# Patient Record
Sex: MALE | Race: WHITE | NOT HISPANIC OR LATINO | URBAN - METROPOLITAN AREA
[De-identification: names, ages, dates, MRNs, and addresses within clinical notes are randomized per-mention and may not be internally consistent; named-entity substitution may affect disease eponyms.]

---

## 2020-10-05 ENCOUNTER — INPATIENT (INPATIENT)
Facility: HOSPITAL | Age: 85
LOS: 17 days | Discharge: NURSING, REHAB OR LONG-TERM CARE | End: 2020-10-23
Attending: INTERNAL MEDICINE | Admitting: INTERNAL MEDICINE
Payer: MEDICARE

## 2020-10-05 ENCOUNTER — TRANSCRIPTION ENCOUNTER (OUTPATIENT)
Age: 85
End: 2020-10-05

## 2020-10-05 VITALS
SYSTOLIC BLOOD PRESSURE: 123 MMHG | HEART RATE: 73 BPM | HEIGHT: 75 IN | RESPIRATION RATE: 16 BRPM | WEIGHT: 229.94 LBS | OXYGEN SATURATION: 98 % | TEMPERATURE: 98 F | DIASTOLIC BLOOD PRESSURE: 74 MMHG

## 2020-10-05 LAB
ABO RH CONFIRMATION: SIGNIFICANT CHANGE UP
ALBUMIN SERPL ELPH-MCNC: 3.7 G/DL — SIGNIFICANT CHANGE UP (ref 3.3–5)
ALP SERPL-CCNC: 90 U/L — SIGNIFICANT CHANGE UP (ref 40–120)
ALT FLD-CCNC: 42 U/L — SIGNIFICANT CHANGE UP (ref 12–78)
ANION GAP SERPL CALC-SCNC: 7 MMOL/L — SIGNIFICANT CHANGE UP (ref 5–17)
APTT BLD: 27 SEC — LOW (ref 27.5–35.5)
AST SERPL-CCNC: 30 U/L — SIGNIFICANT CHANGE UP (ref 15–37)
BASOPHILS # BLD AUTO: 0.01 K/UL — SIGNIFICANT CHANGE UP (ref 0–0.2)
BASOPHILS NFR BLD AUTO: 0.2 % — SIGNIFICANT CHANGE UP (ref 0–2)
BILIRUB SERPL-MCNC: 0.6 MG/DL — SIGNIFICANT CHANGE UP (ref 0.2–1.2)
BLD GP AB SCN SERPL QL: SIGNIFICANT CHANGE UP
BUN SERPL-MCNC: 33 MG/DL — HIGH (ref 7–23)
CALCIUM SERPL-MCNC: 9.3 MG/DL — SIGNIFICANT CHANGE UP (ref 8.5–10.1)
CHLORIDE SERPL-SCNC: 109 MMOL/L — HIGH (ref 96–108)
CO2 SERPL-SCNC: 23 MMOL/L — SIGNIFICANT CHANGE UP (ref 22–31)
CREAT SERPL-MCNC: 1.27 MG/DL — SIGNIFICANT CHANGE UP (ref 0.5–1.3)
EOSINOPHIL # BLD AUTO: 0.01 K/UL — SIGNIFICANT CHANGE UP (ref 0–0.5)
EOSINOPHIL NFR BLD AUTO: 0.2 % — SIGNIFICANT CHANGE UP (ref 0–6)
GLUCOSE SERPL-MCNC: 106 MG/DL — HIGH (ref 70–99)
HCT VFR BLD CALC: 43.7 % — SIGNIFICANT CHANGE UP (ref 39–50)
HGB BLD-MCNC: 14.5 G/DL — SIGNIFICANT CHANGE UP (ref 13–17)
IMM GRANULOCYTES NFR BLD AUTO: 0.7 % — SIGNIFICANT CHANGE UP (ref 0–1.5)
INR BLD: 1.13 RATIO — SIGNIFICANT CHANGE UP (ref 0.88–1.16)
LYMPHOCYTES # BLD AUTO: 0.8 K/UL — LOW (ref 1–3.3)
LYMPHOCYTES # BLD AUTO: 13.1 % — SIGNIFICANT CHANGE UP (ref 13–44)
MCHC RBC-ENTMCNC: 30.9 PG — SIGNIFICANT CHANGE UP (ref 27–34)
MCHC RBC-ENTMCNC: 33.2 GM/DL — SIGNIFICANT CHANGE UP (ref 32–36)
MCV RBC AUTO: 93.2 FL — SIGNIFICANT CHANGE UP (ref 80–100)
MONOCYTES # BLD AUTO: 0.41 K/UL — SIGNIFICANT CHANGE UP (ref 0–0.9)
MONOCYTES NFR BLD AUTO: 6.7 % — SIGNIFICANT CHANGE UP (ref 2–14)
NEUTROPHILS # BLD AUTO: 4.84 K/UL — SIGNIFICANT CHANGE UP (ref 1.8–7.4)
NEUTROPHILS NFR BLD AUTO: 79.1 % — HIGH (ref 43–77)
NRBC # BLD: 0 /100 WBCS — SIGNIFICANT CHANGE UP (ref 0–0)
PLATELET # BLD AUTO: 196 K/UL — SIGNIFICANT CHANGE UP (ref 150–400)
POTASSIUM SERPL-MCNC: 4.3 MMOL/L — SIGNIFICANT CHANGE UP (ref 3.5–5.3)
POTASSIUM SERPL-SCNC: 4.3 MMOL/L — SIGNIFICANT CHANGE UP (ref 3.5–5.3)
PROT SERPL-MCNC: 7.3 GM/DL — SIGNIFICANT CHANGE UP (ref 6–8.3)
PROTHROM AB SERPL-ACNC: 13 SEC — SIGNIFICANT CHANGE UP (ref 10.6–13.6)
RBC # BLD: 4.69 M/UL — SIGNIFICANT CHANGE UP (ref 4.2–5.8)
RBC # FLD: 13.8 % — SIGNIFICANT CHANGE UP (ref 10.3–14.5)
SARS-COV-2 RNA SPEC QL NAA+PROBE: SIGNIFICANT CHANGE UP
SODIUM SERPL-SCNC: 139 MMOL/L — SIGNIFICANT CHANGE UP (ref 135–145)
TROPONIN I SERPL-MCNC: <.015 NG/ML — SIGNIFICANT CHANGE UP (ref 0.01–0.04)
WBC # BLD: 6.11 K/UL — SIGNIFICANT CHANGE UP (ref 3.8–10.5)
WBC # FLD AUTO: 6.11 K/UL — SIGNIFICANT CHANGE UP (ref 3.8–10.5)

## 2020-10-05 PROCEDURE — 73562 X-RAY EXAM OF KNEE 3: CPT | Mod: 26,RT

## 2020-10-05 PROCEDURE — 99223 1ST HOSP IP/OBS HIGH 75: CPT

## 2020-10-05 PROCEDURE — 99285 EMERGENCY DEPT VISIT HI MDM: CPT

## 2020-10-05 PROCEDURE — 73552 X-RAY EXAM OF FEMUR 2/>: CPT | Mod: 26,RT

## 2020-10-05 PROCEDURE — 99222 1ST HOSP IP/OBS MODERATE 55: CPT | Mod: 57

## 2020-10-05 PROCEDURE — 73502 X-RAY EXAM HIP UNI 2-3 VIEWS: CPT | Mod: 26,RT,76

## 2020-10-05 PROCEDURE — 93010 ELECTROCARDIOGRAM REPORT: CPT

## 2020-10-05 PROCEDURE — 71045 X-RAY EXAM CHEST 1 VIEW: CPT | Mod: 26

## 2020-10-05 RX ORDER — SODIUM CHLORIDE 9 MG/ML
1000 INJECTION, SOLUTION INTRAVENOUS
Refills: 0 | Status: DISCONTINUED | OUTPATIENT
Start: 2020-10-05 | End: 2020-10-06

## 2020-10-05 RX ORDER — MAGNESIUM HYDROXIDE 400 MG/1
30 TABLET, CHEWABLE ORAL DAILY
Refills: 0 | Status: DISCONTINUED | OUTPATIENT
Start: 2020-10-05 | End: 2020-10-06

## 2020-10-05 RX ORDER — HEPARIN SODIUM 5000 [USP'U]/ML
5000 INJECTION INTRAVENOUS; SUBCUTANEOUS EVERY 8 HOURS
Refills: 0 | Status: COMPLETED | OUTPATIENT
Start: 2020-10-05 | End: 2020-10-05

## 2020-10-05 RX ORDER — MORPHINE SULFATE 50 MG/1
2 CAPSULE, EXTENDED RELEASE ORAL ONCE
Refills: 0 | Status: DISCONTINUED | OUTPATIENT
Start: 2020-10-05 | End: 2020-10-05

## 2020-10-05 RX ORDER — ASCORBIC ACID 60 MG
500 TABLET,CHEWABLE ORAL
Refills: 0 | Status: DISCONTINUED | OUTPATIENT
Start: 2020-10-05 | End: 2020-10-06

## 2020-10-05 RX ORDER — HYDROMORPHONE HYDROCHLORIDE 2 MG/ML
0.5 INJECTION INTRAMUSCULAR; INTRAVENOUS; SUBCUTANEOUS EVERY 6 HOURS
Refills: 0 | Status: DISCONTINUED | OUTPATIENT
Start: 2020-10-05 | End: 2020-10-06

## 2020-10-05 RX ORDER — FOLIC ACID 0.8 MG
1 TABLET ORAL DAILY
Refills: 0 | Status: DISCONTINUED | OUTPATIENT
Start: 2020-10-05 | End: 2020-10-06

## 2020-10-05 RX ORDER — ACETAMINOPHEN 500 MG
1000 TABLET ORAL ONCE
Refills: 0 | Status: COMPLETED | OUTPATIENT
Start: 2020-10-05 | End: 2021-09-03

## 2020-10-05 RX ORDER — ACETAMINOPHEN 500 MG
1000 TABLET ORAL ONCE
Refills: 0 | Status: COMPLETED | OUTPATIENT
Start: 2020-10-05 | End: 2020-10-05

## 2020-10-05 RX ORDER — OXYCODONE HYDROCHLORIDE 5 MG/1
5 TABLET ORAL EVERY 4 HOURS
Refills: 0 | Status: DISCONTINUED | OUTPATIENT
Start: 2020-10-05 | End: 2020-10-06

## 2020-10-05 RX ORDER — ACETAMINOPHEN 500 MG
650 TABLET ORAL EVERY 6 HOURS
Refills: 0 | Status: DISCONTINUED | OUTPATIENT
Start: 2020-10-05 | End: 2020-10-06

## 2020-10-05 RX ORDER — OXYCODONE HYDROCHLORIDE 5 MG/1
10 TABLET ORAL EVERY 4 HOURS
Refills: 0 | Status: DISCONTINUED | OUTPATIENT
Start: 2020-10-05 | End: 2020-10-06

## 2020-10-05 RX ADMIN — Medication 1000 MILLIGRAM(S): at 23:23

## 2020-10-05 RX ADMIN — Medication 500 MILLIGRAM(S): at 17:41

## 2020-10-05 RX ADMIN — SODIUM CHLORIDE 65 MILLILITER(S): 9 INJECTION, SOLUTION INTRAVENOUS at 15:46

## 2020-10-05 RX ADMIN — Medication 650 MILLIGRAM(S): at 19:54

## 2020-10-05 RX ADMIN — HEPARIN SODIUM 5000 UNIT(S): 5000 INJECTION INTRAVENOUS; SUBCUTANEOUS at 21:19

## 2020-10-05 RX ADMIN — MORPHINE SULFATE 2 MILLIGRAM(S): 50 CAPSULE, EXTENDED RELEASE ORAL at 13:16

## 2020-10-05 RX ADMIN — Medication 400 MILLIGRAM(S): at 22:53

## 2020-10-05 RX ADMIN — Medication 650 MILLIGRAM(S): at 17:42

## 2020-10-05 RX ADMIN — MORPHINE SULFATE 2 MILLIGRAM(S): 50 CAPSULE, EXTENDED RELEASE ORAL at 12:46

## 2020-10-05 NOTE — H&P ADULT - NSHPPHYSICALEXAM_GEN_ALL_CORE
GEN: NAD     RLE: skin intact, extremity shortened and internally rotated, swelling and deformity seen over right hip, TTP at right hip, ROM right hip limited by pain, NTTP at right knee, calf, ankle, SILT L2-S1, motor intact TA/EHL/FHL/GSC, DP pulse present, compartments soft and compressible    Secondary Exam: Benign, Skin intact, NTTP along axial spine, SILT throughout, motor grossly intact throughout, no other orthopedic injuries at this time, compartments soft and compressible

## 2020-10-05 NOTE — ED ADULT NURSE NOTE - OBJECTIVE STATEMENT
Patient received with complaints of pain to R/ LLE post fall of chair while at MD office around 1120. Inversion noted to site.

## 2020-10-05 NOTE — ED PROVIDER NOTE - PHYSICAL EXAMINATION
Gen: Alert, Well appearing. NAD    Head: NC, AT, PERRL, normal lids/conjunctiva   ENT: Bilateral TM WNL, patent oropharynx without erythema/exudate, uvula midline  Neck: supple, no tenderness/meningismus  Pulm: Bilateral clear BS, normal resp effort  CV: RRR, no M/R/G, +dist pulses   Abd: soft, NT/ND, +BS, no guarding/rebound tenderness  Mskel: extremities x4 with normal ROM. no ctl spine ttp. no edema/erythema/cyanosis, no pelvis tenderness.   Lt leg: no pain, mild bow legged and unable to fully extend  Rt leg (abnormal): gross varus deformity with only mild pain to distal femur. unable to fully straighten. able to move toes and flex/extend ankle. good DP/PT pulses. CR < 3secs  Skin: no rash, no bruising  Neuro: AAOx3, no sensory/motor deficits, CN 2-12 intact

## 2020-10-05 NOTE — CONSULT NOTE ADULT - SUBJECTIVE AND OBJECTIVE BOX
HPI:  85M otherwise healthy presents to the ED for right hip pain following a mechanical fall. Pt was sitting down in a chair when he missed the chair falling onto his right hip, he experienced immediate right hip pain and swelling. He denies hit to the head, LOC, any other orthopedic injuries. Pt denies SOB, CP, calf pain. (05 Oct 2020 16:02)      PAST MEDICAL & SURGICAL HISTORY:  irregular heart rate     No significant past surgical history        REVIEW OF SYSTEMS:    CONSTITUTIONAL: No fever, weight loss, or fatigue  EYES: No eye pain, visual disturbances, or discharge  ENMT:  No difficulty hearing, tinnitus, vertigo; No sinus or throat pain  NECK: No pain or stiffness  BREASTS: No pain, masses, or nipple discharge  RESPIRATORY: No cough, wheezing, chills or hemoptysis; No shortness of breath  CARDIOVASCULAR: No chest pain, palpitations, dizziness, or leg swelling  GASTROINTESTINAL: No abdominal or epigastric pain. No nausea, vomiting, or hematemesis; No diarrhea or constipation. No melena or hematochezia.  GENITOURINARY: No dysuria, frequency, hematuria, or incontinence  NEUROLOGICAL: No headaches, memory loss, loss of strength, numbness, or tremors  SKIN: No itching, burning, rashes, or lesions   LYMPH NODES: No enlarged glands  ENDOCRINE: No heat or cold intolerance; No hair loss  MUSCULOSKELETAL: No joint pain or swelling; No muscle, back, or extremity pain  PSYCHIATRIC: No depression, anxiety, mood swings, or difficulty sleeping  HEME/LYMPH: No easy bruising, or bleeding gums  ALLERGY AND IMMUNOLOGIC: No hives or eczema      MEDICATIONS  (STANDING):  acetaminophen   Tablet .. 650 milliGRAM(s) Oral every 6 hours  ascorbic acid 500 milliGRAM(s) Oral two times a day  folic acid 1 milliGRAM(s) Oral daily  lactated ringers. 1000 milliLiter(s) (65 mL/Hr) IV Continuous <Continuous>  multivitamin 1 Tablet(s) Oral daily    MEDICATIONS  (PRN):  HYDROmorphone  Injectable 0.5 milliGRAM(s) IV Push every 6 hours PRN Severe Pain (7 - 10)  magnesium hydroxide Suspension 30 milliLiter(s) Oral daily PRN Constipation  oxyCODONE    IR 10 milliGRAM(s) Oral every 4 hours PRN Severe Pain (7 - 10)  oxyCODONE    IR 5 milliGRAM(s) Oral every 4 hours PRN Moderate Pain (4 - 6)      Allergies    No Known Allergies    Intolerances        SOCIAL HISTORY:    FAMILY HISTORY:      Vital Signs Last 24 Hrs  T(C): 36.6 (05 Oct 2020 17:35), Max: 36.7 (05 Oct 2020 12:15)  T(F): 97.8 (05 Oct 2020 17:35), Max: 98 (05 Oct 2020 12:15)  HR: 84 (05 Oct 2020 17:35) (70 - 84)  BP: 129/69 (05 Oct 2020 17:35) (123/74 - 129/69)  BP(mean): --  RR: 18 (05 Oct 2020 17:35) (16 - 18)  SpO2: 98% (05 Oct 2020 17:35) (98% - 100%)      · Physical Examination: Gen: Alert, Well appearing. NAD    	Head: NC, AT, PERRL, normal lids/conjunctiva   	ENT: Bilateral TM WNL, patent oropharynx without erythema/exudate, uvula midline  	Neck: supple, no tenderness/meningismus  	Pulm: Bilateral clear BS, normal resp effort  	CV: RRR, no M/R/G, +dist pulses   	Abd: soft, NT/ND, +BS, no guarding/rebound tenderness  	Mskel: extremities x4 with normal ROM. no ctl spine ttp. no edema/erythema/cyanosis, no pelvis tenderness.   	Lt leg: no pain, mild bow legged and unable to fully extend  	Rt leg (abnormal): gross varus deformity with only mild pain to distal femur. unable to fully straighten. able to move toes and flex/extend ankle. good DP/PT pulses. CR < 3secs  	Skin: no rash, no bruising  Neuro: AAOx3, no sensory/motor deficits, CN 2-12 intact  LABS:                        14.5   6.11  )-----------( 196      ( 05 Oct 2020 12:54 )             43.7     10-05    139  |  109<H>  |  33<H>  ----------------------------<  106<H>  4.3   |  23  |  1.27    Ca    9.3      05 Oct 2020 12:54    TPro  7.3  /  Alb  3.7  /  TBili  0.6  /  DBili  x   /  AST  30  /  ALT  42  /  AlkPhos  90  10-05    PT/INR - ( 05 Oct 2020 12:54 )   PT: 13.0 sec;   INR: 1.13 ratio         PTT - ( 05 Oct 2020 12:54 )  PTT:27.0 sec      RADIOLOGY & ADDITIONAL STUDIES:    < from: Xray Chest 1 View- PORTABLE-Urgent (Xray Chest 1 View- PORTABLE-Urgent .) (10.05.20 @ 14:15) >  EXAM:  XR CHEST PORTABLE URGENT 1V                            PROCEDURE DATE:  10/05/2020          INTERPRETATION:  A single chest x-ray was obtained on October 5, 2020.    Indication: Broken fever. Admittance side.    Impression:    The heart is normal in size. The lungs are clear. No pleural effusion. No pneumothorax. Degenerative changes of the thoracic spine.    < from: Xray Hip w/ Pelvis 2 or 3 Views, Right (10.05.20 @ 15:22) >    EXAM:  XR HIP WITH PELV 2-3V RT                            PROCEDURE DATE:  10/05/2020          INTERPRETATION:  Date of study: 10/5/20 at 3:09PM    Comparison: Earlier 10/5/20 pelvis and right hip films.    Technique: 2 AP views of pelvis; 1 cross-table lateral view of right hip.    FINDINGS:  Demonstration of acute, overriding, displaced proximal right femoral fracture.  On cross-table lateral view, there is override of the fragments. Distal fragment lies dorsal to the proximal fragment.  Lateral and plantar angulation of the fracture,    Impression:  Redemonstration of acute, overriding, displaced, proximal right femoral fracture.              Imaging Personally Reviewed:  [ X] YES  [ ] NO    Consultant(s) Notes Reviewed:  [X ] YES  [ ] NO    Care Discussed with Consultants/Other Providers [X ] YES  [ ] NO

## 2020-10-05 NOTE — H&P ADULT - HISTORY OF PRESENT ILLNESS
85M otherwise healthy presents to the ED for right hip pain following a mechanical fall. Pt was sitting down in a chair when he missed the chair falling onto his right hip, he experienced immediate right hip pain and swelling. He denies hit to the head, LOC, any other orthopedic injuries. Pt denies SOB, CP, calf pain.

## 2020-10-05 NOTE — H&P ADULT - NSHPLABSRESULTS_GEN_ALL_CORE
Vital Signs Last 24 Hrs  T(C): 36.7 (05 Oct 2020 15:43), Max: 36.7 (05 Oct 2020 12:15)  T(F): 98 (05 Oct 2020 15:43), Max: 98 (05 Oct 2020 12:15)  HR: 70 (05 Oct 2020 15:43) (70 - 73)  BP: 126/88 (05 Oct 2020 15:43) (123/74 - 126/88)  BP(mean): --  RR: 18 (05 Oct 2020 15:43) (16 - 18)  SpO2: 100% (05 Oct 2020 15:43) (98% - 100%)      LABS:                          14.5   6.11  )-----------( 196      ( 05 Oct 2020 12:54 )             43.7     10-05    139  |  109<H>  |  33<H>  ----------------------------<  106<H>  4.3   |  23  |  1.27    Ca    9.3      05 Oct 2020 12:54    TPro  7.3  /  Alb  3.7  /  TBili  0.6  /  DBili  x   /  AST  30  /  ALT  42  /  AlkPhos  90  10-05    PT/INR - ( 05 Oct 2020 12:54 )   PT: 13.0 sec;   INR: 1.13 ratio         PTT - ( 05 Oct 2020 12:54 )  PTT:27.0 sec    Imaging: Xrays right hip demonstrate a 100% displaced, shortened, and medially comminuted subtrochanteric fracture.

## 2020-10-05 NOTE — CONSULT NOTE ADULT - ASSESSMENT
86 y/o male with no acute hipfracture and Afib. Patient evaluated by cardiology and deemed to have acceptable  cardiac risk for proposed procedure     MEDICALLY CLEAR FOR SURGERY     will need to echo after surgery     will need to consider long term anticoagulation     will consider full anticoagulation in consultation with Ortho taking acount post op bleeding risk

## 2020-10-05 NOTE — ED PROVIDER NOTE - CLINICAL SUMMARY MEDICAL DECISION MAKING FREE TEXT BOX
seen by ortho, for admission. seen by ortho, for admission for hip fx. pt noted with afib, does states he has irregular heart, and pt otehrwise asymptomatic. will admit to dr cm. dr shipman aware for consult

## 2020-10-05 NOTE — H&P ADULT - ASSESSMENT
85M w R subtrochanteric fx    -plan for ORIF with Dr. Richardson tomorrow  -admit to Dr. Richardson  -will need medical and cardiac clearance please comment in note  -pain control  -dvt ppx with scds, hold pharm ppx  -NPO after midnight  -preop  -Discussed plan for operation with pt and family, pt agrees, all questions answered  -Discussed with Dr. Richardson who agrees

## 2020-10-05 NOTE — ED PROVIDER NOTE - CARE PLAN
Principal Discharge DX:	Hip fracture   Principal Discharge DX:	Hip fracture  Secondary Diagnosis:	Atrial fibrillation

## 2020-10-05 NOTE — CONSULT NOTE ADULT - SUBJECTIVE AND OBJECTIVE BOX
CARDIOLOGY CONSULTATION NOTE                                                                             HPI:  86yo male with pmh irregular heart beat, presents with rt knee/distal femur pain and deformity s/p falling off chair. Pt is AOX3 and good historian and states he was trying to sit down on rolling chair and then he fell and his rt knee twisted. Pt with mild pain to knee/femur. Denies head strike, LOC. Pt on aspirin. Pt at baseline ambulates with walker.   	No fever/chills, No photophobia/eye pain/changes in vision, No ear pain/sore throat/dysphagia, No chest pain/palpitations, no SOB/cough, no wheeze/stridor, No abdominal pain, No N/V/D, no dysuria/frequency/discharge, No neck/back pain, no rash, no changes in neurological status/function.   REVIEW OF SYSTEMS: -----------------------------  CONSTITUTIONAL: No fever, weight loss, or fatigue EYES: No eye pain, visual disturbances, or discharge ENMT:  No difficulty hearing, tinnitus, vertigo; No sinus or throat pain NECK: No pain or stiffness BREASTS: No pain, masses, or nipple discharge RESPIRATORY: No cough, wheezing, chills or hemoptysis; No shortness of breath CARDIOVASCULAR: See HPI GASTROINTESTINAL: No abdominal or epigastric pain. No nausea, vomiting, or hematemesis; No diarrhea or constipation. No melena or hematochezia. GENITOURINARY: No dysuria, frequency, hematuria, or incontinence NEUROLOGICAL: No headaches, memory loss, loss of strength, numbness, or tremors SKIN: No itching, burning, rashes, or lesions  LYMPH NODES: No enlarged glands ENDOCRINE: No heat or cold intolerance; No hair loss MUSCULOSKELETAL: No joint pain or swelling; No muscle, back, or extremity pain PSYCHIATRIC: No depression, anxiety, mood swings, or difficulty sleeping HEME/LYMPH: No easy bruising, or bleeding gums ALLERGY AND IMMUNOLOGIC: No hives or eczema  Home Medications:   MEDICATIONS  (STANDING): lactated ringers. 1000 milliLiter(s) (65 mL/Hr) IV Continuous <Continuous>   ALLERGIES: No Known Allergies   FAMILY HISTORY:   PHYSICAL EXAMINATION: ----------------------------- T(C): 36.7 (10-05-20 @ 12:15), Max: 36.7 (10-05-20 @ 12:15) HR: 73 (10-05-20 @ 12:15) (73 - 73) BP: 123/74 (10-05-20 @ 12:15) (123/74 - 123/74) RR: 16 (10-05-20 @ 12:15) (16 - 16) SpO2: 98% (10-05-20 @ 12:15) (98% - 98%) Wt(kg): --  Height (cm): 190.5 (10-05 @ 12:15) Weight (kg): 104.3 (10-05 @ 12:15) BMI (kg/m2): 28.7 (10-05 @ 12:15) BSA (m2): 2.33 (10-05 @ 12:15)  Constitutional: well developed, normal appearance, well groomed, well nourished, no deformities and no acute distress.  Eyes: the conjunctiva exhibited no abnormalities and the eyelids demonstrated no xanthelasmas.  HEENT: normal oral mucosa, no oral pallor and no oral cyanosis.  Neck: normal jugular venous A waves present, normal jugular venous V waves present and no jugular venous moy A waves.  Pulmonary: no respiratory distress, normal respiratory rhythm and effort, no accessory muscle use and lungs were clear to auscultation bilaterally.  Cardiovascular: heart rate and rhythm were normal, normal S1 and S2 and no murmur, gallop, rub, heave or thrill are present.  Abdomen: soft, non-tender, no hepato-splenomegaly and no abdominal mass palpated.  Musculoskeletal: the gait could not be assessed..  Extremities: no clubbing of the fingernails, no localized cyanosis, no petechial hemorrhages and no ischemic changes.  Skin: normal skin color and pigmentation, no rash, no venous stasis, no skin lesions, no skin ulcer and no xanthoma was observed.  Psychiatric: oriented to person, place, and time, the affect was normal, the mood was normal and not feeling anxious.   ECG: -------    LABS:  -------- 10-05  139  |  109<H>  |  33<H> ----------------------------<  106<H> 4.3   |  23  |  1.27  Ca    9.3      05 Oct 2020 12:54  TPro  7.3  /  Alb  3.7  /  TBili  0.6  /  DBili  x   /  AST  30  /  ALT  42  /  AlkPhos  90  10-05                       14.5  6.11  )-----------( 196      ( 05 Oct 2020 12:54 )            43.7    PT/INR - ( 05 Oct 2020 12:54 )   PT: 13.0 sec;   INR: 1.13 ratio      PTT - ( 05 Oct 2020 12:54 )  PTT:27.0 sec  10-05 @ 12:54 CPK total:--, CKMB --, Troponin I - <.015 ng/mL     RADIOLOGY REPORTS: -----------------------------    ECHOCARDIOGRAM: ---------------------------     CARDIOLOGY CONSULTATION NOTE                                                                             HPI:  84yo male with pmh irregular heart beat, presents with rt knee/distal femur pain and deformity s/p falling off chair. Pt is AOX3 and good historian and states he was trying to sit down on rolling chair and then he fell and his rt knee twisted. Pt with mild pain to knee/femur. Denies head strike, LOC. Pt on aspirin. Pt at baseline ambulates with walker.   	No fever/chills, No photophobia/eye pain/changes in vision, No ear pain/sore throat/dysphagia, No chest pain/palpitations, no SOB/cough, no wheeze/stridor, No abdominal pain, No N/V/D, no dysuria/frequency/discharge, No neck/back pain, no rash, no changes in neurological status/function.   Patient aware of longstanding AF for several years, no other cardiac history but never had an ischemia evaluation. Patient had refused DOAC because of cost in the past. Also walks with walker due to sever sciatica.  REVIEW OF SYSTEMS: -----------------------------  CONSTITUTIONAL: No fever, weight loss, or fatigue EYES: No eye pain, visual disturbances, or discharge ENMT:  No difficulty hearing, tinnitus, vertigo; No sinus or throat pain NECK: No pain or stiffness BREASTS: No pain, masses, or nipple discharge RESPIRATORY: No cough, wheezing, chills or hemoptysis; No shortness of breath CARDIOVASCULAR: See HPI GASTROINTESTINAL: No abdominal or epigastric pain. No nausea, vomiting, or hematemesis; No diarrhea or constipation. No melena or hematochezia. GENITOURINARY: No dysuria, frequency, hematuria, or incontinence NEUROLOGICAL: No headaches, memory loss, loss of strength, numbness, or tremors SKIN: No itching, burning, rashes, or lesions  LYMPH NODES: No enlarged glands ENDOCRINE: No heat or cold intolerance; No hair loss MUSCULOSKELETAL: No joint pain or swelling; No muscle, back, or extremity pain PSYCHIATRIC: No depression, anxiety, mood swings, or difficulty sleeping HEME/LYMPH: No easy bruising, or bleeding gums ALLERGY AND IMMUNOLOGIC: No hives or eczema  Home Medications:   MEDICATIONS  (STANDING): lactated ringers. 1000 milliLiter(s) (65 mL/Hr) IV Continuous <Continuous>   ALLERGIES: No Known Allergies   FAMILY HISTORY:   PHYSICAL EXAMINATION: ----------------------------- T(C): 36.7 (10-05-20 @ 12:15), Max: 36.7 (10-05-20 @ 12:15) HR: 73 (10-05-20 @ 12:15) (73 - 73) BP: 123/74 (10-05-20 @ 12:15) (123/74 - 123/74) RR: 16 (10-05-20 @ 12:15) (16 - 16) SpO2: 98% (10-05-20 @ 12:15) (98% - 98%) Wt(kg): --  Height (cm): 190.5 (10-05 @ 12:15) Weight (kg): 104.3 (10-05 @ 12:15) BMI (kg/m2): 28.7 (10-05 @ 12:15) BSA (m2): 2.33 (10-05 @ 12:15)  Constitutional: well developed, normal appearance, well groomed, well nourished, no deformities and no acute distress.  Eyes: the conjunctiva exhibited no abnormalities and the eyelids demonstrated no xanthelasmas.  HEENT: normal oral mucosa, no oral pallor and no oral cyanosis.  Neck: normal jugular venous A waves present, normal jugular venous V waves present and no jugular venous moy A waves.  Pulmonary: no respiratory distress, normal respiratory rhythm and effort, no accessory muscle use and lungs were clear to auscultation bilaterally.  Cardiovascular: irreg, irreg, no murmur, gallop, rub, heave or thrill are present.  Abdomen: soft, non-tender, no hepato-splenomegaly and no abdominal mass palpated.  Musculoskeletal: the gait could not be assessed..  Extremities: no clubbing of the fingernails, no localized cyanosis, no petechial hemorrhages and no ischemic changes.  Skin: normal skin color and pigmentation, no rash, no venous stasis, no skin lesions, no skin ulcer and no xanthoma was observed.  Psychiatric: oriented to person, place, and time, the affect was normal, the mood was normal and not feeling anxious.   ECG: ------- < from: 12 Lead ECG (10.05.20 @ 14:33) >  Ventricular Rate 66 BPM  Atrial Rate 43 BPM  QRS Duration 150 ms  Q-T Interval 442 ms  QTC Calculation(Bazett) 463 ms  R Axis -8 degrees  T Axis 6 degrees  Diagnosis Line Atrial fibrillation with premature ventricular or aberrantly conducted complexes Right bundle branch block Abnormal ECG No previous ECGs available Confirmed by Abundio Taylor MD ([id number]) on 10/5/2020 3:41:52 PM  < end of copied text >    LABS:  -------- 10-05  139  |  109<H>  |  33<H> ----------------------------<  106<H> 4.3   |  23  |  1.27  Ca    9.3      05 Oct 2020 12:54  TPro  7.3  /  Alb  3.7  /  TBili  0.6  /  DBili  x   /  AST  30  /  ALT  42  /  AlkPhos  90  10-05                       14.5  6.11  )-----------( 196      ( 05 Oct 2020 12:54 )            43.7    PT/INR - ( 05 Oct 2020 12:54 )   PT: 13.0 sec;   INR: 1.13 ratio      PTT - ( 05 Oct 2020 12:54 )  PTT:27.0 sec  10-05 @ 12:54 CPK total:--, CKMB --, Troponin I - <.015 ng/mL     RADIOLOGY REPORTS: ----------------------------- < from: Xray Chest 1 View- PORTABLE-Urgent (Xray Chest 1 View- PORTABLE-Urgent .) (10.05.20 @ 14:15) >  EXAM:  XR CHEST PORTABLE URGENT 1V                         PROCEDURE DATE:  10/05/2020      INTERPRETATION:  A single chest x-ray was obtained on October 5, 2020.  Indication: Broken fever. Admittance side.  Impression:  The heart is normal in size. The lungs are clear. No pleural effusion. No pneumothorax. Degenerative changes of the thoracic spine.      BANDAR BASS M.D., ATTENDING RADIOLOGIST This document has been electronically signed. Oct  5 2020  4:03PM  < end of copied text >

## 2020-10-05 NOTE — CONSULT NOTE ADULT - ASSESSMENT
The chart has been reviewed but the patient has not yet been examined.  Full note to follow. 86yo male s/p mechanical fall with right intertrochanteric fracture. H/o AFib and HTN, rates adequately controlled.  No recent chest pain or dyspnea.  No cardiac contraindications to ORIF, patient at low risk overall based on current history.  Would discuss need for long term AC with patient post op.  Echo requested for risk stratification but can wait until after surgery.

## 2020-10-05 NOTE — ED PROVIDER NOTE - OBJECTIVE STATEMENT
86yo male with no pertinent pmh presnets with rt knee/distal femur pain and deformity s/p falling off chair. Pt is AOX3 and good historian and states he was trying to sit down on rolling chair and then he fell and his rt knee twisted. Pt with mild pain to knee/femur. Denies head strike, LOC. Pt on aspirin. Pt at baseline ambulates with walker.     No fever/chills, No photophobia/eye pain/changes in vision, No ear pain/sore throat/dysphagia, No chest pain/palpitations, no SOB/cough, no wheeze/stridor, No abdominal pain, No N/V/D, no dysuria/frequency/discharge, No neck/back pain, no rash, no changes in neurological status/function. 84yo male with pmh irregular heart beat (on aspirin), presents with rt knee/distal femur pain and deformity s/p falling off chair. Pt is AOX3 and good historian and states he was trying to sit down on rolling chair and then he fell and his rt knee twisted. Pt with mild pain to knee/femur. Denies head strike, LOC. Pt on aspirin. Pt at baseline ambulates with walker.     No fever/chills, No photophobia/eye pain/changes in vision, No ear pain/sore throat/dysphagia, No chest pain/palpitations, no SOB/cough, no wheeze/stridor, No abdominal pain, No N/V/D, no dysuria/frequency/discharge, No neck/back pain, no rash, no changes in neurological status/function.

## 2020-10-06 ENCOUNTER — TRANSCRIPTION ENCOUNTER (OUTPATIENT)
Age: 85
End: 2020-10-06

## 2020-10-06 LAB
ANION GAP SERPL CALC-SCNC: 3 MMOL/L — LOW (ref 5–17)
ANION GAP SERPL CALC-SCNC: 7 MMOL/L — SIGNIFICANT CHANGE UP (ref 5–17)
APPEARANCE UR: CLEAR — SIGNIFICANT CHANGE UP
APTT BLD: 28.6 SEC — SIGNIFICANT CHANGE UP (ref 27.5–35.5)
BACTERIA # UR AUTO: ABNORMAL
BILIRUB UR-MCNC: NEGATIVE — SIGNIFICANT CHANGE UP
BUN SERPL-MCNC: 33 MG/DL — HIGH (ref 7–23)
BUN SERPL-MCNC: 33 MG/DL — HIGH (ref 7–23)
CALCIUM SERPL-MCNC: 8.8 MG/DL — SIGNIFICANT CHANGE UP (ref 8.5–10.1)
CALCIUM SERPL-MCNC: 9.1 MG/DL — SIGNIFICANT CHANGE UP (ref 8.5–10.1)
CHLORIDE SERPL-SCNC: 106 MMOL/L — SIGNIFICANT CHANGE UP (ref 96–108)
CHLORIDE SERPL-SCNC: 106 MMOL/L — SIGNIFICANT CHANGE UP (ref 96–108)
CO2 SERPL-SCNC: 27 MMOL/L — SIGNIFICANT CHANGE UP (ref 22–31)
CO2 SERPL-SCNC: 28 MMOL/L — SIGNIFICANT CHANGE UP (ref 22–31)
COLOR SPEC: YELLOW — SIGNIFICANT CHANGE UP
CREAT SERPL-MCNC: 1.32 MG/DL — HIGH (ref 0.5–1.3)
CREAT SERPL-MCNC: 1.36 MG/DL — HIGH (ref 0.5–1.3)
DIFF PNL FLD: ABNORMAL
EPI CELLS # UR: SIGNIFICANT CHANGE UP
GLUCOSE SERPL-MCNC: 112 MG/DL — HIGH (ref 70–99)
GLUCOSE SERPL-MCNC: 129 MG/DL — HIGH (ref 70–99)
GLUCOSE UR QL: NEGATIVE MG/DL — SIGNIFICANT CHANGE UP
HCT VFR BLD CALC: 36.3 % — LOW (ref 39–50)
HCT VFR BLD CALC: 38.5 % — LOW (ref 39–50)
HGB BLD-MCNC: 11.7 G/DL — LOW (ref 13–17)
HGB BLD-MCNC: 12.6 G/DL — LOW (ref 13–17)
INR BLD: 1.13 RATIO — SIGNIFICANT CHANGE UP (ref 0.88–1.16)
KETONES UR-MCNC: ABNORMAL
LEUKOCYTE ESTERASE UR-ACNC: ABNORMAL
MCHC RBC-ENTMCNC: 31.1 PG — SIGNIFICANT CHANGE UP (ref 27–34)
MCHC RBC-ENTMCNC: 31.1 PG — SIGNIFICANT CHANGE UP (ref 27–34)
MCHC RBC-ENTMCNC: 32.2 GM/DL — SIGNIFICANT CHANGE UP (ref 32–36)
MCHC RBC-ENTMCNC: 32.7 GM/DL — SIGNIFICANT CHANGE UP (ref 32–36)
MCV RBC AUTO: 95.1 FL — SIGNIFICANT CHANGE UP (ref 80–100)
MCV RBC AUTO: 96.5 FL — SIGNIFICANT CHANGE UP (ref 80–100)
NITRITE UR-MCNC: POSITIVE
NRBC # BLD: 0 /100 WBCS — SIGNIFICANT CHANGE UP (ref 0–0)
NRBC # BLD: 0 /100 WBCS — SIGNIFICANT CHANGE UP (ref 0–0)
PH UR: 5 — SIGNIFICANT CHANGE UP (ref 5–8)
PLATELET # BLD AUTO: 163 K/UL — SIGNIFICANT CHANGE UP (ref 150–400)
PLATELET # BLD AUTO: 166 K/UL — SIGNIFICANT CHANGE UP (ref 150–400)
POTASSIUM SERPL-MCNC: 4.8 MMOL/L — SIGNIFICANT CHANGE UP (ref 3.5–5.3)
POTASSIUM SERPL-MCNC: 5.2 MMOL/L — SIGNIFICANT CHANGE UP (ref 3.5–5.3)
POTASSIUM SERPL-SCNC: 4.8 MMOL/L — SIGNIFICANT CHANGE UP (ref 3.5–5.3)
POTASSIUM SERPL-SCNC: 5.2 MMOL/L — SIGNIFICANT CHANGE UP (ref 3.5–5.3)
PROT UR-MCNC: 30 MG/DL
PROTHROM AB SERPL-ACNC: 13 SEC — SIGNIFICANT CHANGE UP (ref 10.6–13.6)
RBC # BLD: 3.76 M/UL — LOW (ref 4.2–5.8)
RBC # BLD: 4.05 M/UL — LOW (ref 4.2–5.8)
RBC # FLD: 13.7 % — SIGNIFICANT CHANGE UP (ref 10.3–14.5)
RBC # FLD: 13.9 % — SIGNIFICANT CHANGE UP (ref 10.3–14.5)
RBC CASTS # UR COMP ASSIST: ABNORMAL /HPF (ref 0–4)
SARS-COV-2 IGG SERPL QL IA: NEGATIVE — SIGNIFICANT CHANGE UP
SARS-COV-2 IGM SERPL IA-ACNC: <0.1 INDEX — SIGNIFICANT CHANGE UP
SODIUM SERPL-SCNC: 137 MMOL/L — SIGNIFICANT CHANGE UP (ref 135–145)
SODIUM SERPL-SCNC: 140 MMOL/L — SIGNIFICANT CHANGE UP (ref 135–145)
SP GR SPEC: 1.02 — SIGNIFICANT CHANGE UP (ref 1.01–1.02)
UROBILINOGEN FLD QL: NEGATIVE MG/DL — SIGNIFICANT CHANGE UP
WBC # BLD: 12.44 K/UL — HIGH (ref 3.8–10.5)
WBC # BLD: 8.4 K/UL — SIGNIFICANT CHANGE UP (ref 3.8–10.5)
WBC # FLD AUTO: 12.44 K/UL — HIGH (ref 3.8–10.5)
WBC # FLD AUTO: 8.4 K/UL — SIGNIFICANT CHANGE UP (ref 3.8–10.5)
WBC UR QL: SIGNIFICANT CHANGE UP

## 2020-10-06 PROCEDURE — 93306 TTE W/DOPPLER COMPLETE: CPT | Mod: 26

## 2020-10-06 PROCEDURE — 27245 TREAT THIGH FRACTURE: CPT | Mod: RT

## 2020-10-06 PROCEDURE — 99233 SBSQ HOSP IP/OBS HIGH 50: CPT

## 2020-10-06 RX ORDER — CEFAZOLIN SODIUM 1 G
2000 VIAL (EA) INJECTION EVERY 8 HOURS
Refills: 0 | Status: DISCONTINUED | OUTPATIENT
Start: 2020-10-06 | End: 2020-10-07

## 2020-10-06 RX ORDER — ONDANSETRON 8 MG/1
4 TABLET, FILM COATED ORAL ONCE
Refills: 0 | Status: DISCONTINUED | OUTPATIENT
Start: 2020-10-06 | End: 2020-10-06

## 2020-10-06 RX ORDER — ONDANSETRON 8 MG/1
4 TABLET, FILM COATED ORAL EVERY 6 HOURS
Refills: 0 | Status: DISCONTINUED | OUTPATIENT
Start: 2020-10-06 | End: 2020-10-06

## 2020-10-06 RX ORDER — CEFTRIAXONE 500 MG/1
1000 INJECTION, POWDER, FOR SOLUTION INTRAMUSCULAR; INTRAVENOUS EVERY 24 HOURS
Refills: 0 | Status: DISCONTINUED | OUTPATIENT
Start: 2020-10-07 | End: 2020-10-07

## 2020-10-06 RX ORDER — ASPIRIN/CALCIUM CARB/MAGNESIUM 324 MG
1 TABLET ORAL
Qty: 60 | Refills: 0
Start: 2020-10-06 | End: 2020-11-04

## 2020-10-06 RX ORDER — OXYCODONE HYDROCHLORIDE 5 MG/1
5 TABLET ORAL EVERY 4 HOURS
Refills: 0 | Status: DISCONTINUED | OUTPATIENT
Start: 2020-10-06 | End: 2020-10-11

## 2020-10-06 RX ORDER — ONDANSETRON 8 MG/1
4 TABLET, FILM COATED ORAL EVERY 6 HOURS
Refills: 0 | Status: DISCONTINUED | OUTPATIENT
Start: 2020-10-06 | End: 2020-10-23

## 2020-10-06 RX ORDER — DOCUSATE SODIUM 100 MG
1 CAPSULE ORAL
Qty: 60 | Refills: 0
Start: 2020-10-06 | End: 2020-11-04

## 2020-10-06 RX ORDER — SODIUM CHLORIDE 9 MG/ML
1000 INJECTION, SOLUTION INTRAVENOUS
Refills: 0 | Status: DISCONTINUED | OUTPATIENT
Start: 2020-10-06 | End: 2020-10-06

## 2020-10-06 RX ORDER — HYDROMORPHONE HYDROCHLORIDE 2 MG/ML
0.2 INJECTION INTRAMUSCULAR; INTRAVENOUS; SUBCUTANEOUS
Refills: 0 | Status: DISCONTINUED | OUTPATIENT
Start: 2020-10-06 | End: 2020-10-06

## 2020-10-06 RX ORDER — HYDROMORPHONE HYDROCHLORIDE 2 MG/ML
0.5 INJECTION INTRAMUSCULAR; INTRAVENOUS; SUBCUTANEOUS EVERY 6 HOURS
Refills: 0 | Status: DISCONTINUED | OUTPATIENT
Start: 2020-10-06 | End: 2020-10-08

## 2020-10-06 RX ORDER — LANOLIN ALCOHOL/MO/W.PET/CERES
3 CREAM (GRAM) TOPICAL AT BEDTIME
Refills: 0 | Status: DISCONTINUED | OUTPATIENT
Start: 2020-10-06 | End: 2020-10-23

## 2020-10-06 RX ORDER — ASPIRIN/CALCIUM CARB/MAGNESIUM 324 MG
1 TABLET ORAL
Qty: 0 | Refills: 0 | DISCHARGE

## 2020-10-06 RX ORDER — ASCORBIC ACID 60 MG
500 TABLET,CHEWABLE ORAL
Refills: 0 | Status: DISCONTINUED | OUTPATIENT
Start: 2020-10-06 | End: 2020-10-23

## 2020-10-06 RX ORDER — FOLIC ACID 0.8 MG
1 TABLET ORAL DAILY
Refills: 0 | Status: DISCONTINUED | OUTPATIENT
Start: 2020-10-06 | End: 2020-10-23

## 2020-10-06 RX ORDER — METOCLOPRAMIDE HCL 10 MG
10 TABLET ORAL EVERY 6 HOURS
Refills: 0 | Status: DISCONTINUED | OUTPATIENT
Start: 2020-10-06 | End: 2020-10-06

## 2020-10-06 RX ORDER — OXYCODONE HYDROCHLORIDE 5 MG/1
1 TABLET ORAL
Qty: 42 | Refills: 0
Start: 2020-10-06 | End: 2020-10-12

## 2020-10-06 RX ORDER — OXYCODONE HYDROCHLORIDE 5 MG/1
10 TABLET ORAL EVERY 4 HOURS
Refills: 0 | Status: DISCONTINUED | OUTPATIENT
Start: 2020-10-06 | End: 2020-10-11

## 2020-10-06 RX ORDER — HYDROMORPHONE HYDROCHLORIDE 2 MG/ML
0.5 INJECTION INTRAMUSCULAR; INTRAVENOUS; SUBCUTANEOUS EVERY 6 HOURS
Refills: 0 | Status: DISCONTINUED | OUTPATIENT
Start: 2020-10-06 | End: 2020-10-06

## 2020-10-06 RX ORDER — ONDANSETRON 8 MG/1
1 TABLET, FILM COATED ORAL
Qty: 20 | Refills: 0
Start: 2020-10-06 | End: 2020-10-10

## 2020-10-06 RX ORDER — BENZOCAINE AND MENTHOL 5; 1 G/100ML; G/100ML
1 LIQUID ORAL EVERY 8 HOURS
Refills: 0 | Status: DISCONTINUED | OUTPATIENT
Start: 2020-10-06 | End: 2020-10-23

## 2020-10-06 RX ORDER — HYDROMORPHONE HYDROCHLORIDE 2 MG/ML
0.4 INJECTION INTRAMUSCULAR; INTRAVENOUS; SUBCUTANEOUS
Refills: 0 | Status: DISCONTINUED | OUTPATIENT
Start: 2020-10-06 | End: 2020-10-06

## 2020-10-06 RX ORDER — SODIUM CHLORIDE 9 MG/ML
1000 INJECTION, SOLUTION INTRAVENOUS
Refills: 0 | Status: DISCONTINUED | OUTPATIENT
Start: 2020-10-06 | End: 2020-10-08

## 2020-10-06 RX ORDER — ACETAMINOPHEN 500 MG
1000 TABLET ORAL ONCE
Refills: 0 | Status: COMPLETED | OUTPATIENT
Start: 2020-10-06 | End: 2020-10-06

## 2020-10-06 RX ADMIN — Medication 3 MILLIGRAM(S): at 23:30

## 2020-10-06 RX ADMIN — Medication 500 MILLIGRAM(S): at 05:10

## 2020-10-06 RX ADMIN — Medication 100 MILLIGRAM(S): at 22:14

## 2020-10-06 RX ADMIN — SODIUM CHLORIDE 65 MILLILITER(S): 9 INJECTION, SOLUTION INTRAVENOUS at 08:21

## 2020-10-06 RX ADMIN — Medication 1000 MILLIGRAM(S): at 19:43

## 2020-10-06 RX ADMIN — Medication 1 MILLIGRAM(S): at 12:30

## 2020-10-06 RX ADMIN — ONDANSETRON 4 MILLIGRAM(S): 8 TABLET, FILM COATED ORAL at 00:33

## 2020-10-06 RX ADMIN — OXYCODONE HYDROCHLORIDE 10 MILLIGRAM(S): 5 TABLET ORAL at 08:21

## 2020-10-06 RX ADMIN — Medication 500 MILLIGRAM(S): at 18:45

## 2020-10-06 RX ADMIN — SODIUM CHLORIDE 65 MILLILITER(S): 9 INJECTION, SOLUTION INTRAVENOUS at 00:33

## 2020-10-06 RX ADMIN — Medication 650 MILLIGRAM(S): at 13:31

## 2020-10-06 RX ADMIN — Medication 650 MILLIGRAM(S): at 12:31

## 2020-10-06 RX ADMIN — Medication 650 MILLIGRAM(S): at 05:40

## 2020-10-06 RX ADMIN — OXYCODONE HYDROCHLORIDE 10 MILLIGRAM(S): 5 TABLET ORAL at 03:38

## 2020-10-06 RX ADMIN — Medication 1 TABLET(S): at 12:30

## 2020-10-06 RX ADMIN — Medication 650 MILLIGRAM(S): at 05:10

## 2020-10-06 RX ADMIN — OXYCODONE HYDROCHLORIDE 10 MILLIGRAM(S): 5 TABLET ORAL at 09:29

## 2020-10-06 RX ADMIN — Medication 400 MILLIGRAM(S): at 18:45

## 2020-10-06 RX ADMIN — OXYCODONE HYDROCHLORIDE 10 MILLIGRAM(S): 5 TABLET ORAL at 03:08

## 2020-10-06 RX ADMIN — SODIUM CHLORIDE 70 MILLILITER(S): 9 INJECTION, SOLUTION INTRAVENOUS at 18:51

## 2020-10-06 NOTE — DISCHARGE NOTE PROVIDER - NSDCFUADDINST_GEN_ALL_CORE_FT
IM Nail DC Instructions:    1.	Resume previous diet, regular or diabetic as appropriate  2.	Weight Bearing as Tolerated with assistance and rolling walker  3.	Continue DVT/PE Prophylaxis. See Med Rec for Duration and dose.  4.	PT daily  5.	Follow up with Orthopedic Surgeon Dr. Richardson in 10-14 Days after Discharge from the Hospital. Call Office asap For Appointment.  6.	Staples to be removed Post-Op Day 14, provided wound is healed, no open areas or copious drainage.  7.	Ice the hip as much as possible  8.	Keep Aquacel bandage on Hip. Change only if wet or soiled using Tegaderm/Paper tape and dry gauze.  9.                OK to shower with Aquacel beige bandage, otherwise sponge bathe only. Will need assistance to shower. IM Nail DC Instructions:    1.	Resume previous diet, regular or diabetic as appropriate  2.	Weight Bearing as Tolerated with assistance and rolling walker  3.	Continue DVT/PE Prophylaxis. Xarelto 10mg.   4.	PT daily  5.	Follow up with Orthopedic Surgeon Dr. Richardson in 10-14 Days after Discharge from the Hospital. Call Office asap For Appointment.  6.	Staples to be removed Post-Op Day 14, provided wound is healed, no open areas or copious drainage.  7.	Ice the hip as much as possible  8.	Keep Aquacel bandage on Hip. Change only if wet or soiled using Tegaderm/Paper tape and dry gauze.  9.                OK to shower with Aquacel beige bandage, otherwise sponge bathe only. Will need assistance to shower.  10. Please follow up with primary care doctor after Discharge from hospital/rehab IM Nail DC Instructions:    1.	Resume previous diet, regular or diabetic as appropriate  2.	Weight Bearing as Tolerated with assistance and rolling walker  3.	Continue DVT/PE Prophylaxis. Xarelto 10mg.   4.	PT daily  5.	Follow up with Orthopedic Surgeon Dr. Richardson in 10-14 Days after Discharge from the Hospital. Call Office asap For Appointment.  6.	Staples to be removed Post-Op Day 14, provided wound is healed, no open areas or copious drainage.  7.	Ice the hip as much as possible  8.	Keep Aquacel bandage on Hip. Change only if wet or soiled using Tegaderm/Paper tape and dry gauze.  9.                OK to shower with Aquacel beige bandage, otherwise sponge bathe only. Will need assistance to shower.  10. Please follow up with cardiology, Dr. Taylor, in 1-2 weeks after Discharge from hospital/rehab. IM Nail DC Instructions:    1.	Resume previous diet, regular or diabetic as appropriate  2.	Weight Bearing as Tolerated with assistance and rolling walker  3.	Continue DVT/PE Prophylaxis. Xarelto 20mg.   4.	PT daily  5.	Follow up with Orthopedic Surgeon Dr. Richardson in 10-14 Days after Discharge from the Hospital. Call Office asap For Appointment.  6.	Staples to be removed Post-Op Day 14, provided wound is healed, no open areas or copious drainage.  7.	Ice the hip as much as possible  8.	Keep Aquacel bandage on Hip. Change only if wet or soiled using Tegaderm/Paper tape and dry gauze.  9.                OK to shower with Aquacel beige bandage, otherwise sponge bathe only. Will need assistance to shower.  10. Please follow up with cardiology, Dr. Taylor, in 1-2 weeks after Discharge from hospital/rehab.

## 2020-10-06 NOTE — DISCHARGE NOTE PROVIDER - PROVIDER TOKENS
PROVIDER:[TOKEN:[[id number]]] PROVIDER:[TOKEN:[[id number]]],PROVIDER:[TOKEN:[[id number]]] PROVIDER:[TOKEN:[[id number]]],PROVIDER:[TOKEN:[[id number]]],PROVIDER:[TOKEN:[[id number]]]

## 2020-10-06 NOTE — DISCHARGE NOTE PROVIDER - HOSPITAL COURSE
The patient is a 85 year old male status post Open Reduction Surgical Fixation of a right subtrochanteric femur fracture after being admitted through [facility]. The Patient was medically Optimized for the Previously mentioned surgical procedure. The patient was taken to the operating room on date mentioned above. Prophylactic antibiotics were started before the procedure and continued for 24 hours.  There were no complications during the procedure and patient tolerated the procedure well.  The patient was transferred to recovery room in stable condition and subsequently to surgical floor.  Patient was placed on Lovenox &  Heparin & Aspirin & Xarelto & Coumadin for anticoagulation.  All home medications were continued.  The patient received physical therapy daily and daily labs were followed.  The dressing / Splint/ Cast/ Brace was kept clean, dry, intact and changed on POD 3.  *The rest of the hospital stay was unremarkable The patient is a 85 year old male status post Open Reduction Surgical Fixation of a right subtrochanteric femur fracture after being admitted through [facility]. The Patient was medically Optimized for the Previously mentioned surgical procedure. The patient was taken to the operating room on date mentioned above. Prophylactic antibiotics were started before the procedure and continued for 24 hours.  There were no complications during the procedure and patient tolerated the procedure well.  The patient was transferred to recovery room in stable condition and subsequently to surgical floor.  Patient was placed on Lovenox &  Heparin & Aspirin & Xarelto & Coumadin for anticoagulation.  All home medications were continued.  The patient received physical therapy daily and daily labs were followed.  The dressing was kept clean, dry, intact and changed on POD 3. The rest of the hospital stay was unremarkable The patient is a 85 year old male status post Open Reduction Surgical Fixation of a right subtrochanteric femur fracture after being admitted through [facility] Room. The Patient was medically Optimized for the Previously mentioned surgical procedure. The patient was taken to the operating room on date mentioned above. Prophylactic antibiotics were started before the procedure and continued for 24 hours.  There were no complications during the procedure and patient tolerated the procedure well.  The patient was transferred to recovery room in stable condition and subsequently to surgical floor.  Patient was placed on  Xarelto anticoagulation.  All home medications were continued.  The patient received physical therapy daily and daily labs were followed. The dressing was kept clean, dry, intact and changed as needed. The rest of the hospital stay was unremarkable The patient is a 85 year old male status post Open Reduction Surgical Fixation of a right subtrochanteric femur fracture after being admitted through [facility] Room. The Patient was medically Optimized for the Previously mentioned surgical procedure. The patient was taken to the operating room on date mentioned above. Prophylactic antibiotics were started before the procedure and continued for 24 hours.  There were no complications during the procedure and patient tolerated the procedure well.  The patient was transferred to recovery room in stable condition and subsequently to surgical floor.  Patient was placed on  Xarelto anticoagulation.  All home medications were continued.  The patient received physical therapy daily and daily labs were followed. The dressing was kept clean, dry, intact and changed as needed. The rest of the hospital stay was unremarkable       illius vs gastric outlet obstruction  gastritis tolerating clears advance diet today   -Routine post surgical ortho care. pain controlled   -Staples to be removed POD 14  Ortho stable for DC, Outpatient follow up    -hypokalemia replaced      UTI   UA ++ LE and nitrite but minimal wbc and epithelial cells. ucx neg. remains afebrile   afebrile s/p three day course of levaquin     acute blood loss anemia. 2/2 surgery   - cbc stable       Afib  -Echo reviewed  - Pt  agreed to AC so would recommend a NOAC. He reports only falling this one time. understands risk of falling on AC   - restart  xarelto 20 mg daily   vomitng   -2/2 post op ileus vs gastric outlet syndrome s/p egd   aspiration PNA   - ct shows lower lobe opacities and wbc stable and afebrile. procalcitonin normal off Levaquin   renal mass   - incidentally found on ct  - needs follow up with urologist   - pt aware and reminded      45min spent on dc

## 2020-10-06 NOTE — DISCHARGE NOTE PROVIDER - CARE PROVIDERS DIRECT ADDRESSES
[email or web address]

## 2020-10-06 NOTE — PROGRESS NOTE ADULT - ASSESSMENT
Assessment:  85y Male with RIGHT subtrochanteric hip fx    -Pain control  -Non-weightbearing, strict bedrest  -NPO except medications  -IV fluids while NPO  -DVT ppx: SCDs, please hold chemical ppx for OR  -Plan for OR for IMN with Dr. Richardson today 10/6  -Med/cards clearance appreciated   -Medical management per primary team  -Will discuss with attending and will advise if plan changes.     Assessment:  85y Male with RIGHT subtrochanteric hip fx    -Pain control  -Non-weightbearing, strict bedrest  -NPO except medications  -IV fluids while NPO  -DVT ppx: SCDs, please hold chemical ppx for OR  -Plan for OR for IMN with Dr. Richardson today 10/6  -Med/cards clearance appreciated   -Medical management appreciated  -Will discuss with attending and will advise if plan changes.

## 2020-10-06 NOTE — PHYSICAL THERAPY INITIAL EVALUATION ADULT - CRITERIA FOR SKILLED THERAPEUTIC INTERVENTIONS
impairments found/Pending functional assessment/anticipated equipment needs at discharge/anticipated discharge recommendation/functional limitations in following categories/predicted duration of therapy intervention/risk reduction/prevention/rehab potential/therapy frequency

## 2020-10-06 NOTE — DISCHARGE NOTE PROVIDER - CARE PROVIDER_API CALL
Clayton Richardson  ORTHOPAEDIC SURGERY  [address], [address]  [place] [address]  Phone: [phone]  Fax: [phone]  Follow Up Time:    Clayton Richardson  ORTHOPAEDIC SURGERY  [address]  [place] [address]  Phone: [phone]  Fax: [phone]  Follow Up Time:     Abundio Taylor  CARDIOLOGY  [address] [place] [address]  Phone: [phone]  Fax: [phone]  Follow Up Time:    Clayton Richardson  ORTHOPAEDIC SURGERY  [address] [facility] [address] [facility] [address]  [place] [address]  Phone: [phone]  Fax: [phone]  Follow Up Time:     Abundio Taylor  CARDIOLOGY  [address] [place] [address]  Phone: [phone]  Fax: [phone]  Follow Up Time:     Jaelyn Patel)  Urology  [address] [place] [address]  Phone: [phone]  Fax: [phone]  Follow Up Time:

## 2020-10-06 NOTE — PHYSICAL THERAPY INITIAL EVALUATION ADULT - ADDITIONAL COMMENTS
As per pt, he lives with hi spouse in a high ranch house in the 1st floor with no steps to enter or negotiate, he ambulates through side walk, he was independent for all functional mobility using RW , PTA

## 2020-10-06 NOTE — PROGRESS NOTE ADULT - SUBJECTIVE AND OBJECTIVE BOX
HPI:  85M otherwise healthy presents to the ED for right hip pain following a mechanical fall. Pt was sitting down in a chair when he missed the chair falling onto his right hip, he experienced immediate right hip pain and swelling. He denies hit to the head, LOC, any other orthopedic injuries. Pt denies SOB, CP, calf pain. (05 Oct 2020 16:02)    Patient is a 85y old  Male who presents with a chief complaint of right hip fx (06 Oct 2020 17:16)      INTERVAL HPI/OVERNIGHT EVENTS: POD 0 s/p IMN placement     MEDICATIONS  (STANDING):  acetaminophen  IVPB .. 1000 milliGRAM(s) IV Intermittent once  ascorbic acid 500 milliGRAM(s) Oral two times a day  ceFAZolin   IVPB 2000 milliGRAM(s) IV Intermittent every 8 hours  cefTRIAXone   IVPB 1000 milliGRAM(s) IV Intermittent every 24 hours  folic acid 1 milliGRAM(s) Oral daily  lactated ringers. 1000 milliLiter(s) (70 mL/Hr) IV Continuous <Continuous>  multivitamin 1 Tablet(s) Oral daily    MEDICATIONS  (PRN):  benzocaine 15 mG/menthol 3.6 mG (Sugar-Free) Lozenge 1 Lozenge Oral every 8 hours PRN Sore Throat  HYDROmorphone  Injectable 0.5 milliGRAM(s) IV Push every 6 hours PRN Moderate Pain (4 - 6)  HYDROmorphone  Injectable 0.2 milliGRAM(s) IV Push every 10 minutes PRN Moderate Pain (4 - 6)  HYDROmorphone  Injectable 0.4 milliGRAM(s) IV Push every 10 minutes PRN Severe Pain (7 - 10)  melatonin 3 milliGRAM(s) Oral at bedtime PRN Sleep  ondansetron Injectable 4 milliGRAM(s) IV Push every 6 hours PRN Nausea and/or Vomiting  ondansetron Injectable 4 milliGRAM(s) IV Push once PRN Nausea and/or Vomiting  oxyCODONE    IR 10 milliGRAM(s) Oral every 4 hours PRN Moderate Pain (4 - 6)  oxyCODONE    IR 5 milliGRAM(s) Oral every 4 hours PRN Mild Pain (1 - 3)      Allergies    No Known Allergies    Intolerances        REVIEW OF SYSTEMS:  CONSTITUTIONAL: No fever, weight loss, or fatigue  EYES: No eye pain, visual disturbances, or discharge  ENMT:  No difficulty hearing, tinnitus, vertigo; No sinus or throat pain  NECK: No pain or stiffness  BREASTS: No pain, masses, or nipple discharge  RESPIRATORY: No cough, wheezing, chills or hemoptysis; No shortness of breath  CARDIOVASCULAR: No chest pain, palpitations, dizziness, or leg swelling  GASTROINTESTINAL: No abdominal or epigastric pain. No nausea, vomiting, or hematemesis; No diarrhea or constipation. No melena or hematochezia.  GENITOURINARY: No dysuria, frequency, hematuria, or incontinence  NEUROLOGICAL: No headaches, memory loss, loss of strength, numbness, or tremors  SKIN: No itching, burning, rashes, or lesions   LYMPH NODES: No enlarged glands  ENDOCRINE: No heat or cold intolerance; No hair loss  MUSCULOSKELETAL: No joint pain or swelling; No muscle, back, or extremity pain  PSYCHIATRIC: No depression, anxiety, mood swings, or difficulty sleeping  HEME/LYMPH: No easy bruising, or bleeding gums  ALLERGY AND IMMUNOLOGIC: No hives or eczema    Vital Signs Last 24 Hrs  T(C): 36.5 (06 Oct 2020 17:30), Max: 37.2 (05 Oct 2020 20:40)  T(F): 97.7 (06 Oct 2020 17:30), Max: 98.9 (05 Oct 2020 20:40)  HR: 81 (06 Oct 2020 17:30) (77 - 102)  BP: 103/74 (06 Oct 2020 17:30) (95/70 - 137/93)  BP(mean): --  RR: 15 (06 Oct 2020 17:30) (14 - 18)  SpO2: 96% (06 Oct 2020 17:30) (95% - 100%)    PHYSICAL EXAM:  GENERAL: NAD, well-groomed, well-developed  HEAD:  Atraumatic, Normocephalic  EYES: EOMI, PERRLA, conjunctiva and sclera clear  ENMT: No tonsillar erythema, exudates, or enlargement; Moist mucous membranes, Good dentition, No lesions  NECK: Supple, No JVD, Normal thyroid  NERVOUS SYSTEM:  Alert & Oriented X3, Good concentration; Motor Strength 5/5 B/L upper and lower extremities; DTRs 2+ intact and symmetric  CHEST/LUNG: Clear to percussion bilaterally; No rales, rhonchi, wheezing, or rubs  HEART: Regular rate and rhythm; No murmurs, rubs, or gallops  ABDOMEN: Soft, Nontender, Nondistended; Bowel sounds present  EXTREMITIES:  right leg wound dressed   LYMPH: No lymphadenopathy noted  SKIN: No rashes or lesions    LABS:                        11.7   12.44 )-----------( 166      ( 06 Oct 2020 17:31 )             36.3     10    137  |  106  |  33<H>  ----------------------------<  112<H>  5.2   |  28  |  1.36<H>    Ca    8.8      06 Oct 2020 17:31    TPro  7.3  /  Alb  3.7  /  TBili  0.6  /  DBili  x   /  AST  30  /  ALT  42  /  AlkPhos  90  10-05    PT/INR - ( 06 Oct 2020 08:12 )   PT: 13.0 sec;   INR: 1.13 ratio         PTT - ( 06 Oct 2020 08:12 )  PTT:28.6 sec  Urinalysis Basic - ( 05 Oct 2020 23:58 )    Color: Yellow / Appearance: Clear / S.025 / pH: x  Gluc: x / Ketone: Moderate  / Bili: Negative / Urobili: Negative mg/dL   Blood: x / Protein: 30 mg/dL / Nitrite: Positive   Leuk Esterase: Trace / RBC: 6-10 /HPF / WBC 3-5   Sq Epi: x / Non Sq Epi: Occasional / Bacteria: Few      CAPILLARY BLOOD GLUCOSE          RADIOLOGY & ADDITIONAL TESTS:  < from: TTE Echo Complete w/o Contrast w/ Doppler (10.06.20 @ 09:02) >  Summary:   1. Left ventricular ejection fraction, by visual estimation, is 60 to 65%.   2. Mild mitral valve regurgitation.   3. Mild tricuspid regurgitation.   4. Peak transaortic gradient equals 17.3 mmHg, mean transaortic gradient equals 7.4 mmHg, the calculated aortic valve area equals 2.05 cm² by the continuity equation consistent with mild aortic stenosis.    < end of copied text >    Imaging Personally Reviewed:  [X ] YES  [ ] NO    Consultant(s) Notes Reviewed:  [X ] YES  [ ] NO    Care Discussed with Consultants/Other Providers [ X] YES  [ ] NO

## 2020-10-06 NOTE — PROGRESS NOTE ADULT - SUBJECTIVE AND OBJECTIVE BOX
Patient seen and examined at bedside, resting comfortably. Pain well controlled. Denies headache, lightheadedness, dizziness, chest pain, dyspnea, n/v, numbness/tingling. No complaints at this time.       LABS:                        14.5   6.11  )-----------( 196      ( 05 Oct 2020 12:54 )             43.7     10-05    139  |  109<H>  |  33<H>  ----------------------------<  106<H>  4.3   |  23  |  1.27    Ca    9.3      05 Oct 2020 12:54    TPro  7.3  /  Alb  3.7  /  TBili  0.6  /  DBili  x   /  AST  30  /  ALT  42  /  AlkPhos  90  10-05    PT/INR - ( 05 Oct 2020 12:54 )   PT: 13.0 sec;   INR: 1.13 ratio         PTT - ( 05 Oct 2020 12:54 )  PTT:27.0 sec  Urinalysis Basic - ( 05 Oct 2020 23:58 )    Color: Yellow / Appearance: Clear / S.025 / pH: x  Gluc: x / Ketone: Moderate  / Bili: Negative / Urobili: Negative mg/dL   Blood: x / Protein: 30 mg/dL / Nitrite: Positive   Leuk Esterase: Trace / RBC: 6-10 /HPF / WBC 3-5   Sq Epi: x / Non Sq Epi: Occasional / Bacteria: Few        Vitals  T(C): 36.6 (10-06-20 @ 05:12), Max: 37.2 (10-05-20 @ 20:40)  HR: 80 (10-06-20 @ 05:12) (70 - 84)  BP: 106/68 (10-06-20 @ 05:12) (106/68 - 137/93)  RR: 18 (10-06-20 @ 05:12) (16 - 18)  SpO2: 97% (10-06-20 @ 05:12) (96% - 100%)      PHYSICAL EXAM  General: NAD, Awake and Alert    RIGHT Lower Extremity:  Skin intact  L2-S1 SILT  +TA/EHL/FHL/GSC  + DP pulses dopplerable  Compartments soft and compressible  Calves nontender     Patient seen and examined at bedside, resting comfortably. Pain well controlled. Denies headache, lightheadedness, dizziness, chest pain, dyspnea, n/v, numbness/tingling. No complaints at this time. + Nausea overnight relieved with medication.      LABS:                        14.5   6.11  )-----------( 196      ( 05 Oct 2020 12:54 )             43.7     10-05    139  |  109<H>  |  33<H>  ----------------------------<  106<H>  4.3   |  23  |  1.27    Ca    9.3      05 Oct 2020 12:54    TPro  7.3  /  Alb  3.7  /  TBili  0.6  /  DBili  x   /  AST  30  /  ALT  42  /  AlkPhos  90  10-05    PT/INR - ( 05 Oct 2020 12:54 )   PT: 13.0 sec;   INR: 1.13 ratio         PTT - ( 05 Oct 2020 12:54 )  PTT:27.0 sec  Urinalysis Basic - ( 05 Oct 2020 23:58 )    Color: Yellow / Appearance: Clear / S.025 / pH: x  Gluc: x / Ketone: Moderate  / Bili: Negative / Urobili: Negative mg/dL   Blood: x / Protein: 30 mg/dL / Nitrite: Positive   Leuk Esterase: Trace / RBC: 6-10 /HPF / WBC 3-5   Sq Epi: x / Non Sq Epi: Occasional / Bacteria: Few        Vitals  T(C): 36.6 (10-06-20 @ 05:12), Max: 37.2 (10-05-20 @ 20:40)  HR: 80 (10-06-20 @ 05:12) (70 - 84)  BP: 106/68 (10-06-20 @ 05:12) (106/68 - 137/93)  RR: 18 (10-06-20 @ 05:12) (16 - 18)  SpO2: 97% (10-06-20 @ 05:12) (96% - 100%)      PHYSICAL EXAM  General: NAD, Awake and Alert    RIGHT Lower Extremity:  Skin intact  L2-S1 SILT  +TA/EHL/FHL/GSC  + DP pulses dopplerable  Compartments soft and compressible  Calves nontender

## 2020-10-06 NOTE — DISCHARGE NOTE PROVIDER - NSDCMRMEDTOKEN_GEN_ALL_CORE_FT
aspirin 81 mg oral tablet: 1 tab(s) orally once a day   aspirin 81 mg oral delayed release tablet: 1 tab(s) orally 2 times a day   Colace 100 mg oral capsule: 1 cap(s) orally 2 times a day   oxyCODONE 5 mg oral tablet: 1 tab(s) orally every 4 hours, As Needed -for severe pain MDD:8  Zofran 4 mg oral tablet: 1 tab(s) orally every 6 hours    calcium carbonate 500 mg (200 mg elemental calcium) oral tablet, chewable: 2 tab(s) orally every 4 hours, As needed, Heartburn  guaiFENesin 100 mg/5 mL oral liquid: 10 milliliter(s) orally every 6 hours, As needed, Cough  oxyCODONE 10 mg oral tablet: 1 tab(s) orally every 4 hours, As needed, Moderate Pain (4 - 6)  oxyCODONE 5 mg oral tablet: 1 tab(s) orally every 4 hours, As needed, Mild Pain (1 - 3)  rivaroxaban 10 mg oral tablet: 1 tab(s) orally once a day   calcium carbonate 500 mg (200 mg elemental calcium) oral tablet, chewable: 2 tab(s) orally every 4 hours, As needed, Heartburn  guaiFENesin 100 mg/5 mL oral liquid: 10 milliliter(s) orally every 6 hours, As needed, Cough  oxyCODONE 10 mg oral tablet: 1 tab(s) orally every 4 hours, As needed, Moderate Pain (4 - 6)  oxyCODONE 5 mg oral tablet: 1 tab(s) orally every 4 hours, As needed, Mild Pain (1 - 3)   calcium carbonate 500 mg (200 mg elemental calcium) oral tablet, chewable: 2 tab(s) orally every 4 hours, As needed, Heartburn  guaiFENesin 100 mg/5 mL oral liquid: 10 milliliter(s) orally every 6 hours, As needed, Cough  oxyCODONE 10 mg oral tablet: 1 tab(s) orally every 4 hours, As needed, Moderate Pain (4 - 6)  oxyCODONE 5 mg oral tablet: 1 tab(s) orally every 4 hours, As needed, Mild Pain (1 - 3)  rivaroxaban 20 mg oral tablet: 1 tab(s) orally once a day   calcium carbonate 500 mg (200 mg elemental calcium) oral tablet, chewable: 2 tab(s) orally every 4 hours, As needed, Heartburn  guaiFENesin 100 mg/5 mL oral liquid: 10 milliliter(s) orally every 6 hours, As needed, Cough  lactulose 10 g/15 mL oral syrup: 15 milliliter(s) orally 2 times a day  midodrine 5 mg oral tablet: 1 tab(s) orally 3 times a day  oxyCODONE 10 mg oral tablet: 1 tab(s) orally every 4 hours, As needed, Moderate Pain (4 - 6)  oxyCODONE 5 mg oral tablet: 1 tab(s) orally every 4 hours, As needed, Mild Pain (1 - 3)  rivaroxaban 20 mg oral tablet: 1 tab(s) orally once a day (before a meal)  sucralfate 1 g oral tablet: 1 tab(s) orally 4 times a day   calcium carbonate 500 mg (200 mg elemental calcium) oral tablet, chewable: 2 tab(s) orally every 4 hours, As needed, Heartburn  guaiFENesin 100 mg/5 mL oral liquid: 10 milliliter(s) orally every 6 hours, As needed, Cough  lactulose 10 g/15 mL oral syrup: 30 milliliter(s) orally   lactulose 10 g/15 mL oral syrup: 15 milliliter(s) orally 2 times a day  midodrine 5 mg oral tablet: 1 tab(s) orally 3 times a day  oxyCODONE 10 mg oral tablet: 1 tab(s) orally every 4 hours, As needed, Moderate Pain (4 - 6)  oxyCODONE 5 mg oral tablet: 1 tab(s) orally every 4 hours, As needed, Mild Pain (1 - 3)  rivaroxaban 20 mg oral tablet: 1 tab(s) orally once a day (before a meal)  sucralfate 1 g oral tablet: 1 tab(s) orally 4 times a day

## 2020-10-06 NOTE — PROGRESS NOTE ADULT - ASSESSMENT
84 y/o male with acute hip fracture and Afib. Patient evaluated by cardiology and deemed to have acceptable  cardiac risk for proscedure. POD 0    -Routine post surgical ortho care  Positive UA empiric antibiotics with Ceftriaxone   -Echo reviewed continue cardio keyla (Fefer)l and possibility of anticoagulation   -PT eval

## 2020-10-06 NOTE — PROGRESS NOTE ADULT - SUBJECTIVE AND OBJECTIVE BOX
Orthopedic Post Op Check Note    Patient seen and examined at bedside, resting comfortably. Patient's pain controlled. Denies any numbness/tingling.  Patient tolerated procedure well    VITAL SIGNS  T(C): 36.7 (10-06-20 @ 16:15), Max: 37.2 (10-05-20 @ 20:40)  HR: 88 (10-06-20 @ 16:45) (77 - 102)  BP: 105/81 (10-06-20 @ 16:45) (95/70 - 137/93)  RR: 16 (10-06-20 @ 16:45) (14 - 18)  SpO2: 95% (10-06-20 @ 16:45) (95% - 100%)    LABS:  drawn in pacu          PHYSICAL EXAM  GEN: NAD  RLE:  Skin: Dressing clean/dry/intact  Compartments soft and compressible  Calves nontender  +TA/EHL/FHL/GSC  L2-S1 SILT  + DP pulses      Assessment:  85y Male s/p IMN POD #0    -Pain control  -DVT ppx, per cardiology to start POD 1, reached out to inpatient cardiology team regarding ppx  -WBAT/OOB with assistance as needed  -PT/OT  -Ice and elevate  -Encourage incentive spirometry  -DC planning  -Will discuss with attending and will advise if plan changes.

## 2020-10-06 NOTE — DISCHARGE NOTE PROVIDER - NSDCCPCAREPLAN_GEN_ALL_CORE_FT
PRINCIPAL DISCHARGE DIAGNOSIS  Diagnosis: Hip fracture  Assessment and Plan of Treatment:       SECONDARY DISCHARGE DIAGNOSES  Diagnosis: Atrial fibrillation  Assessment and Plan of Treatment:

## 2020-10-07 LAB
ANION GAP SERPL CALC-SCNC: 6 MMOL/L — SIGNIFICANT CHANGE UP (ref 5–17)
BUN SERPL-MCNC: 36 MG/DL — HIGH (ref 7–23)
CALCIUM SERPL-MCNC: 8.7 MG/DL — SIGNIFICANT CHANGE UP (ref 8.5–10.1)
CHLORIDE SERPL-SCNC: 104 MMOL/L — SIGNIFICANT CHANGE UP (ref 96–108)
CO2 SERPL-SCNC: 26 MMOL/L — SIGNIFICANT CHANGE UP (ref 22–31)
CREAT SERPL-MCNC: 1.24 MG/DL — SIGNIFICANT CHANGE UP (ref 0.5–1.3)
CULTURE RESULTS: SIGNIFICANT CHANGE UP
GLUCOSE SERPL-MCNC: 127 MG/DL — HIGH (ref 70–99)
HCT VFR BLD CALC: 31.7 % — LOW (ref 39–50)
HGB BLD-MCNC: 10.6 G/DL — LOW (ref 13–17)
MCHC RBC-ENTMCNC: 31.8 PG — SIGNIFICANT CHANGE UP (ref 27–34)
MCHC RBC-ENTMCNC: 33.4 GM/DL — SIGNIFICANT CHANGE UP (ref 32–36)
MCV RBC AUTO: 95.2 FL — SIGNIFICANT CHANGE UP (ref 80–100)
NRBC # BLD: 0 /100 WBCS — SIGNIFICANT CHANGE UP (ref 0–0)
PLATELET # BLD AUTO: 147 K/UL — LOW (ref 150–400)
POTASSIUM SERPL-MCNC: 4.7 MMOL/L — SIGNIFICANT CHANGE UP (ref 3.5–5.3)
POTASSIUM SERPL-SCNC: 4.7 MMOL/L — SIGNIFICANT CHANGE UP (ref 3.5–5.3)
RBC # BLD: 3.33 M/UL — LOW (ref 4.2–5.8)
RBC # FLD: 13.8 % — SIGNIFICANT CHANGE UP (ref 10.3–14.5)
SODIUM SERPL-SCNC: 136 MMOL/L — SIGNIFICANT CHANGE UP (ref 135–145)
SPECIMEN SOURCE: SIGNIFICANT CHANGE UP
WBC # BLD: 9.59 K/UL — SIGNIFICANT CHANGE UP (ref 3.8–10.5)
WBC # FLD AUTO: 9.59 K/UL — SIGNIFICANT CHANGE UP (ref 3.8–10.5)

## 2020-10-07 PROCEDURE — 99233 SBSQ HOSP IP/OBS HIGH 50: CPT

## 2020-10-07 PROCEDURE — 99232 SBSQ HOSP IP/OBS MODERATE 35: CPT

## 2020-10-07 RX ORDER — CEFTRIAXONE 500 MG/1
1000 INJECTION, POWDER, FOR SOLUTION INTRAMUSCULAR; INTRAVENOUS EVERY 24 HOURS
Refills: 0 | Status: DISCONTINUED | OUTPATIENT
Start: 2020-10-07 | End: 2020-10-07

## 2020-10-07 RX ORDER — ENOXAPARIN SODIUM 100 MG/ML
40 INJECTION SUBCUTANEOUS EVERY 12 HOURS
Refills: 0 | Status: DISCONTINUED | OUTPATIENT
Start: 2020-10-07 | End: 2020-10-07

## 2020-10-07 RX ORDER — ASPIRIN/CALCIUM CARB/MAGNESIUM 324 MG
81 TABLET ORAL DAILY
Refills: 0 | Status: DISCONTINUED | OUTPATIENT
Start: 2020-10-07 | End: 2020-10-08

## 2020-10-07 RX ORDER — ACETAMINOPHEN 500 MG
1000 TABLET ORAL ONCE
Refills: 0 | Status: COMPLETED | OUTPATIENT
Start: 2020-10-07 | End: 2020-10-07

## 2020-10-07 RX ORDER — ACETAMINOPHEN 500 MG
1000 TABLET ORAL ONCE
Refills: 0 | Status: DISCONTINUED | OUTPATIENT
Start: 2020-10-07 | End: 2020-10-12

## 2020-10-07 RX ORDER — ENOXAPARIN SODIUM 100 MG/ML
40 INJECTION SUBCUTANEOUS DAILY
Refills: 0 | Status: DISCONTINUED | OUTPATIENT
Start: 2020-10-07 | End: 2020-10-08

## 2020-10-07 RX ADMIN — CEFTRIAXONE 100 MILLIGRAM(S): 500 INJECTION, POWDER, FOR SOLUTION INTRAMUSCULAR; INTRAVENOUS at 05:44

## 2020-10-07 RX ADMIN — Medication 1 TABLET(S): at 12:33

## 2020-10-07 RX ADMIN — Medication 500 MILLIGRAM(S): at 05:44

## 2020-10-07 RX ADMIN — Medication 1 MILLIGRAM(S): at 12:33

## 2020-10-07 RX ADMIN — Medication 81 MILLIGRAM(S): at 12:33

## 2020-10-07 RX ADMIN — SODIUM CHLORIDE 70 MILLILITER(S): 9 INJECTION, SOLUTION INTRAVENOUS at 05:51

## 2020-10-07 RX ADMIN — Medication 1000 MILLIGRAM(S): at 07:02

## 2020-10-07 RX ADMIN — ENOXAPARIN SODIUM 40 MILLIGRAM(S): 100 INJECTION SUBCUTANEOUS at 12:34

## 2020-10-07 RX ADMIN — Medication 400 MILLIGRAM(S): at 06:32

## 2020-10-07 RX ADMIN — Medication 500 MILLIGRAM(S): at 17:53

## 2020-10-07 RX ADMIN — SODIUM CHLORIDE 70 MILLILITER(S): 9 INJECTION, SOLUTION INTRAVENOUS at 07:34

## 2020-10-07 NOTE — OCCUPATIONAL THERAPY INITIAL EVALUATION ADULT - TRANSFER SAFETY CONCERNS NOTED: BED/CHAIR, REHAB EVAL
stand pivot/decreased step length/decreased safety awareness/decreased sequencing ability/decreased proprioception/squat pivot/stepping too close to front of assistive device/decreased weight-shifting ability/decreased balance during turns

## 2020-10-07 NOTE — PROGRESS NOTE ADULT - SUBJECTIVE AND OBJECTIVE BOX
HPI:  85M otherwise healthy presents to the ED for right hip pain following a mechanical fall. Pt was sitting down in a chair when he missed the chair falling onto his right hip, he experienced immediate right hip pain and swelling. He denies hit to the head, LOC, any other orthopedic injuries. Pt denies SOB, CP, calf pain. (05 Oct 2020 16:02)    Patient is a 85y old  Male who presents with a chief complaint of right hip fx (06 Oct 2020 17:16)      INTERVAL HPI/OVERNIGHT EVENTS: POD 1 s/p IMN placement. afebrile     MEDICATIONS  (STANDING):  ascorbic acid 500 milliGRAM(s) Oral two times a day  aspirin  chewable 81 milliGRAM(s) Oral daily  cefTRIAXone   IVPB 1000 milliGRAM(s) IV Intermittent every 24 hours  enoxaparin Injectable 40 milliGRAM(s) SubCutaneous daily  folic acid 1 milliGRAM(s) Oral daily  lactated ringers. 1000 milliLiter(s) (70 mL/Hr) IV Continuous <Continuous>  multivitamin 1 Tablet(s) Oral daily    MEDICATIONS  (PRN):  benzocaine 15 mG/menthol 3.6 mG (Sugar-Free) Lozenge 1 Lozenge Oral every 8 hours PRN Sore Throat  HYDROmorphone  Injectable 0.5 milliGRAM(s) IV Push every 6 hours PRN Moderate Pain (4 - 6)  melatonin 3 milliGRAM(s) Oral at bedtime PRN Sleep  ondansetron Injectable 4 milliGRAM(s) IV Push every 6 hours PRN Nausea and/or Vomiting  oxyCODONE    IR 10 milliGRAM(s) Oral every 4 hours PRN Moderate Pain (4 - 6)  oxyCODONE    IR 5 milliGRAM(s) Oral every 4 hours PRN Mild Pain (1 - 3)        Allergies    No Known Allergies    Intolerances        REVIEW OF SYSTEMS:  CONSTITUTIONAL: No fever, weight loss, or fatigue  EYES: No eye pain, visual disturbances, or discharge  ENMT:  No difficulty hearing, tinnitus, vertigo; No sinus or throat pain  NECK: No pain or stiffness  BREASTS: No pain, masses, or nipple discharge  RESPIRATORY: No cough, wheezing, chills or hemoptysis; No shortness of breath  CARDIOVASCULAR: No chest pain, palpitations, dizziness, or leg swelling  GASTROINTESTINAL: No abdominal or epigastric pain. No nausea, vomiting, or hematemesis; No diarrhea or constipation. No melena or hematochezia.  GENITOURINARY: No dysuria, frequency, hematuria, or incontinence  NEUROLOGICAL: No headaches, memory loss, loss of strength, numbness, or tremors  SKIN: No itching, burning, rashes, or lesions   LYMPH NODES: No enlarged glands  ENDOCRINE: No heat or cold intolerance; No hair loss  MUSCULOSKELETAL: No joint pain or swelling; No muscle, back, or extremity pain  PSYCHIATRIC: No depression, anxiety, mood swings, or difficulty sleeping  HEME/LYMPH: No easy bruising, or bleeding gums  ALLERGY AND IMMUNOLOGIC: No hives or eczema    Vital Signs Last 24 Hrs  T(C): 36.7 (07 Oct 2020 10:59), Max: 36.7 (06 Oct 2020 16:15)  T(F): 98 (07 Oct 2020 10:59), Max: 98 (06 Oct 2020 16:15)  HR: 103 (07 Oct 2020 10:59) (70 - 103)  BP: 115/82 (07 Oct 2020 10:59) (95/70 - 116/61)  BP(mean): --  RR: 16 (07 Oct 2020 10:59) (14 - 19)  SpO2: 98% (07 Oct 2020 10:59) (95% - 100%)    PHYSICAL EXAM:  GENERAL: NAD, well-groomed, well-developed  HEAD:  Atraumatic, Normocephalic  EYES: EOMI, PERRLA, conjunctiva and sclera clear  ENMT: No tonsillar erythema, exudates, or enlargement; Moist mucous membranes, Good dentition, No lesions  NECK: Supple, No JVD, Normal thyroid  NERVOUS SYSTEM:  Alert & Oriented X3, Good concentration; Motor Strength 5/5 B/L upper and lower extremities; DTRs 2+ intact and symmetric  CHEST/LUNG: Clear to percussion bilaterally; No rales, rhonchi, wheezing, or rubs  HEART: Regular rate and rhythm; No murmurs, rubs, or gallops  ABDOMEN: Soft, Nontender, Nondistended; Bowel sounds present  EXTREMITIES:  right leg wound dressed   LYMPH: No lymphadenopathy noted  SKIN: No rashes or lesions    LABS:                                           10.6   9.59  )-----------( 147      ( 07 Oct 2020 07:41 )             31.7     10-07    136  |  104  |  36<H>  ----------------------------<  127<H>  4.7   |  26  |  1.24    Ca    8.7      07 Oct 2020 07:41      PT/INR - ( 06 Oct 2020 08:12 )   PT: 13.0 sec;   INR: 1.13 ratio         PTT - ( 06 Oct 2020 08:12 )  PTT:28.6 sec  Urinalysis Basic - ( 05 Oct 2020 23:58 )    Color: Yellow / Appearance: Clear / S.025 / pH: x  Gluc: x / Ketone: Moderate  / Bili: Negative / Urobili: Negative mg/dL   Blood: x / Protein: 30 mg/dL / Nitrite: Positive   Leuk Esterase: Trace / RBC: 6-10 /HPF / WBC 3-5   Sq Epi: x / Non Sq Epi: Occasional / Bacteria: Few          CAPILLARY BLOOD GLUCOSE          RADIOLOGY & ADDITIONAL TESTS:  < from: TTE Echo Complete w/o Contrast w/ Doppler (10.06.20 @ 09:02) >  Summary:   1. Left ventricular ejection fraction, by visual estimation, is 60 to 65%.   2. Mild mitral valve regurgitation.   3. Mild tricuspid regurgitation.   4. Peak transaortic gradient equals 17.3 mmHg, mean transaortic gradient equals 7.4 mmHg, the calculated aortic valve area equals 2.05 cm² by the continuity equation consistent with mild aortic stenosis.    < end of copied text >    Imaging Personally Reviewed:  [X ] YES  [ ] NO    Consultant(s) Notes Reviewed:  [X ] YES  [ ] NO    Care Discussed with Consultants/Other Providers [ X] YES  [ ] NO

## 2020-10-07 NOTE — OCCUPATIONAL THERAPY INITIAL EVALUATION ADULT - IMPAIRMENTS CONTRIBUTING IMPAIRED BED MOBILITY, REHAB EVAL
narrow base of support/pain/decreased sensation/decreased strength/decreased ROM/impaired balance/impaired coordination/abnormal muscle tone/decreased flexibility

## 2020-10-07 NOTE — OCCUPATIONAL THERAPY INITIAL EVALUATION ADULT - IMPAIRED TRANSFERS: BED/CHAIR, REHAB EVAL
decreased ROM/decreased sensation/impaired sensory feedback/impaired balance/impaired coordination/decreased flexibility/abnormal muscle tone/decreased strength/impaired motor control/impaired postural control

## 2020-10-07 NOTE — OCCUPATIONAL THERAPY INITIAL EVALUATION ADULT - PERTINENT HX OF CURRENT PROBLEM, REHAB EVAL
Pt is an 86 y/o male who presented to ER on due to mechanical fall wile sitting on a chair. Pt is diagnosed with AFIB and right hip fracture. XX-Ray right hip on 10/5/2020 results confirm a acute, overriding, displaced proximal right femoral fracture Pt is an 86 y/o male who presented to ER on due to mechanical fall wile sitting on a chair. Pt is diagnosed with AFIB and right hip fracture. X-Ray right hip on 10/5/2020 results confirm a acute, overriding, displaced proximal right femoral fracture

## 2020-10-07 NOTE — OCCUPATIONAL THERAPY INITIAL EVALUATION ADULT - RANGE OF MOTION EXAMINATION, LOWER EXTREMITY
Left LE Active ROM was WFL (within functional limits)/ROM is limited in RLE due to pain , from fractured hip / s/p IMN/Left LE Passive ROM was WFL (w/i functional limits)

## 2020-10-07 NOTE — OCCUPATIONAL THERAPY INITIAL EVALUATION ADULT - BALANCE TRAINING, PT EVAL
Pt will increased standing balance from poor to fair+ in 8 weeks.  to prevent falls, optimize pt's ability for ADL management & safely navigate in all terrains Pt will increased standing balance from poor to fair+ in 8 weeks  to prevent falls, optimize pt's ability for ADL management & safely navigate in all terrains

## 2020-10-07 NOTE — OCCUPATIONAL THERAPY INITIAL EVALUATION ADULT - SOCIAL CONCERNS
Pt voiced concerns about his recovery and the loss of ability to ambulate and care for himself independently./Complex psychosocial needs/coping issues Pt voiced concerns about his recovery,  the loss of ability to ambulate and care for himself independently./Complex psychosocial needs/coping issues

## 2020-10-07 NOTE — PROGRESS NOTE ADULT - ASSESSMENT
86yo male s/p mechanical fall with right intertrochanteric fracture. H/o AFib and HTN, rates adequately controlled.  No recent chest pain or dyspnea.  Echo with preserved EF, mild MR/TR/AS    s/p Intramedullary nailing of femur 06-Oct-2020     Plan  Post op care as per ortho team  pain control  HR relatively stable   BP on low side  monitor h/h, Hct  43-> 31.7 now  Can start on DOAC for AFib for stroke reduction, once cleared by ortho and h/h  stabilized 86yo male s/p mechanical fall with right intertrochanteric fracture. H/o AFib and HTN, rates adequately controlled.  No recent chest pain or dyspnea.  Echo with preserved EF, mild MR/TR/AS    s/p Intramedullary nailing of femur 06-Oct-2020     Plan    Post op care as per ortho team  pain control  HR relatively stable   BP on low side  monitor h/h, Hct  43-> 31.7 now  Can start on DOAC for AFib for stroke reduction, once cleared by ortho and h/h  stabilized

## 2020-10-07 NOTE — OCCUPATIONAL THERAPY INITIAL EVALUATION ADULT - PLANNED THERAPY INTERVENTIONS, OT EVAL
ROM/strengthening/bed mobility training/energy conservation techniques/parent/caregiver training.../transfer training/IADL retraining/balance training/neuromuscular re-education/ADL retraining

## 2020-10-07 NOTE — PROGRESS NOTE ADULT - SUBJECTIVE AND OBJECTIVE BOX
Patient is a 85y old  Male who presents with a chief complaint of right hip fx (07 Oct 2020 13:44)    PAST MEDICAL & SURGICAL HISTORY:  Afib  HTN    INTERVAL HISTORY:  	  MEDICATIONS:  MEDICATIONS  (STANDING):  ascorbic acid 500 milliGRAM(s) Oral two times a day  aspirin  chewable 81 milliGRAM(s) Oral daily  enoxaparin Injectable 40 milliGRAM(s) SubCutaneous daily  folic acid 1 milliGRAM(s) Oral daily  lactated ringers. 1000 milliLiter(s) (70 mL/Hr) IV Continuous <Continuous>  multivitamin 1 Tablet(s) Oral daily    MEDICATIONS  (PRN):  benzocaine 15 mG/menthol 3.6 mG (Sugar-Free) Lozenge 1 Lozenge Oral every 8 hours PRN Sore Throat  HYDROmorphone  Injectable 0.5 milliGRAM(s) IV Push every 6 hours PRN Moderate Pain (4 - 6)  melatonin 3 milliGRAM(s) Oral at bedtime PRN Sleep  ondansetron Injectable 4 milliGRAM(s) IV Push every 6 hours PRN Nausea and/or Vomiting  oxyCODONE    IR 10 milliGRAM(s) Oral every 4 hours PRN Moderate Pain (4 - 6)  oxyCODONE    IR 5 milliGRAM(s) Oral every 4 hours PRN Mild Pain (1 - 3)      Vitals:  T(F): 98 (10-07-20 @ 10:59), Max: 98 (10-06-20 @ 16:15)  HR: 103 (10-07-20 @ 10:59) (70 - 103)  BP: 115/82 (10-07-20 @ 10:59) (95/70 - 116/61)  RR: 16 (10-07-20 @ 10:59) (14 - 19)  SpO2: 98% (10-07-20 @ 10:59) (95% - 100%)    10-06 @ 07:01  -  10-07 @ 07:00  --------------------------------------------------------  IN:    IV PiggyBack: 100 mL    Lactated Ringers: 210 mL    Lactated Ringers: 125 mL    Oral Fluid: 240 mL  Total IN: 675 mL    OUT:    Voided (mL): 500 mL  Total OUT: 500 mL    Total NET: 175 mL    Weight (kg): 98.8 (10-06 @ 14:12)  BMI (kg/m2): 27.2 (10-06 @ 14:12)    PHYSICAL EXAM:  Neuro: Awake, responsive  CV: S1 S2 irregular  + SM  Lungs: CTABL  GI: Soft, BS +, ND, NT  Extremities: No edema    RADIOLOGY:   < from: Xray Chest 1 View- PORTABLE-Urgent (Xray Chest 1 View- PORTABLE-Urgent .) (10.05.20 @ 14:15) >  The heart is normal in size. The lungs are clear. No pleural effusion. No pneumothorax. Degenerative changes of the thoracic spine.    < end of copied text >    DIAGNOSTIC TESTING:    [x ] Echocardiogram:   < from: TTE Echo Complete w/o Contrast w/ Doppler (10.06.20 @ 09:02) >  Left Ventricle: Normal left ventricular size and wall thicknesses, with normal systolic and diastolic function.  Left ventricular ejection fraction, by visual estimation, is 60 to 65%.  Right Ventricle: Normal right ventricular size and function.  Left Atrium: The left atrium is normal in size.  Right Atrium: The right atrium is normal in size.  Pericardium: There is no evidence of pericardial effusion.  Mitral Valve: Structurally normal mitral valve, with normal leaflet excursion. Mild mitral valve regurgitation is seen.  Tricuspid Valve: Structurally normal tricuspid valve, with normal leaflet excursion. Mild tricuspid regurgitation is visualized.  Aortic Valve: Normal trileaflet aortic valve with normal opening. Peak transaortic gradient equals 17.3 mmHg, mean transaortic gradient equals 7.4 mmHg, the calculated aortic valve area equals 2.05 cm² by the continuity equation consistent with mild aortic stenosis. Trivial aortic valve regurgitation is seen.  Pulmonic Valve: Structurally normal pulmonic valve, with normal leaflet excursion. No indication of pulmonic valve regurgitation.  Aorta: The aortic root and ascending aorta are structurally normal, with no evidence of dilitation.  Pulmonary Artery: The main pulmonary artery is normal in size.      Summary:   1. Left ventricular ejection fraction, by visual estimation, is 60 to 65%.   2. Mild mitral valve regurgitation.   3. Mild tricuspid regurgitation.   4. Peak transaortic gradient equals 17.3 mmHg, mean transaortic gradient equals 7.4 mmHg, the calculated aortic valve area equals 2.05 cm² by the continuity equation consistent with mild aortic stenosis.    < end of copied text >    LABS:	 	    CARDIAC MARKERS:  Troponin I, Serum: <.015 ng/mL (10-05 @ 12:54)    07 Oct 2020 07:41    136    |  104    |  36     ----------------------------<  127    4.7     |  26     |  1.24   06 Oct 2020 17:31    137    |  106    |  33     ----------------------------<  112    5.2     |  28     |  1.36   06 Oct 2020 08:12    140    |  106    |  33     ----------------------------<  129    4.8     |  27     |  1.32     Ca    8.7        07 Oct 2020 07:41                        10.6   9.59  )-----------( 147      ( 07 Oct 2020 07:41 )             31.7 ,                       11.7   12.44 )-----------( 166      ( 06 Oct 2020 17:31 )             36.3 ,                       12.6   8.40  )-----------( 163      ( 06 Oct 2020 08:12 )             38.5 ,                       14.5   6.11  )-----------( 196      ( 05 Oct 2020 12:54 )             43.7   INR: 1.13 ratio (10-06 @ 08:12)  INR: 1.13 ratio (10-05 @ 12:54)           Patient is a 85y old  Male who presents with a chief complaint of right hip fx (07 Oct 2020 13:44)    PAST MEDICAL & SURGICAL HISTORY:  Afib  HTN    INTERVAL HISTORY: Resting in bed, in no distress   	  MEDICATIONS:  MEDICATIONS  (STANDING):  ascorbic acid 500 milliGRAM(s) Oral two times a day  aspirin  chewable 81 milliGRAM(s) Oral daily  enoxaparin Injectable 40 milliGRAM(s) SubCutaneous daily  folic acid 1 milliGRAM(s) Oral daily  lactated ringers. 1000 milliLiter(s) (70 mL/Hr) IV Continuous <Continuous>  multivitamin 1 Tablet(s) Oral daily    MEDICATIONS  (PRN):  benzocaine 15 mG/menthol 3.6 mG (Sugar-Free) Lozenge 1 Lozenge Oral every 8 hours PRN Sore Throat  HYDROmorphone  Injectable 0.5 milliGRAM(s) IV Push every 6 hours PRN Moderate Pain (4 - 6)  melatonin 3 milliGRAM(s) Oral at bedtime PRN Sleep  ondansetron Injectable 4 milliGRAM(s) IV Push every 6 hours PRN Nausea and/or Vomiting  oxyCODONE    IR 10 milliGRAM(s) Oral every 4 hours PRN Moderate Pain (4 - 6)  oxyCODONE    IR 5 milliGRAM(s) Oral every 4 hours PRN Mild Pain (1 - 3)      Vitals:  T(F): 98 (10-07-20 @ 10:59), Max: 98 (10-06-20 @ 16:15)  HR: 103 (10-07-20 @ 10:59) (70 - 103)  BP: 115/82 (10-07-20 @ 10:59) (95/70 - 116/61)  RR: 16 (10-07-20 @ 10:59) (14 - 19)  SpO2: 98% (10-07-20 @ 10:59) (95% - 100%)    10-06 @ 07:01  -  10-07 @ 07:00  --------------------------------------------------------  IN:    IV PiggyBack: 100 mL    Lactated Ringers: 210 mL    Lactated Ringers: 125 mL    Oral Fluid: 240 mL  Total IN: 675 mL    OUT:    Voided (mL): 500 mL  Total OUT: 500 mL    Total NET: 175 mL    Weight (kg): 98.8 (10-06 @ 14:12)  BMI (kg/m2): 27.2 (10-06 @ 14:12)    PHYSICAL EXAM:  Neuro: Awake, responsive  CV: S1 S2 irregular  + SM  Lungs: CTABL  GI: Soft, BS +, ND, NT  Extremities: + Rt LE edema post op with dressing intact     RADIOLOGY:   < from: Xray Chest 1 View- PORTABLE-Urgent (Xray Chest 1 View- PORTABLE-Urgent .) (10.05.20 @ 14:15) >  The heart is normal in size. The lungs are clear. No pleural effusion. No pneumothorax. Degenerative changes of the thoracic spine.    < end of copied text >    DIAGNOSTIC TESTING:    [x ] Echocardiogram:   < from: TTE Echo Complete w/o Contrast w/ Doppler (10.06.20 @ 09:02) >  Left Ventricle: Normal left ventricular size and wall thicknesses, with normal systolic and diastolic function.  Left ventricular ejection fraction, by visual estimation, is 60 to 65%.  Right Ventricle: Normal right ventricular size and function.  Left Atrium: The left atrium is normal in size.  Right Atrium: The right atrium is normal in size.  Pericardium: There is no evidence of pericardial effusion.  Mitral Valve: Structurally normal mitral valve, with normal leaflet excursion. Mild mitral valve regurgitation is seen.  Tricuspid Valve: Structurally normal tricuspid valve, with normal leaflet excursion. Mild tricuspid regurgitation is visualized.  Aortic Valve: Normal trileaflet aortic valve with normal opening. Peak transaortic gradient equals 17.3 mmHg, mean transaortic gradient equals 7.4 mmHg, the calculated aortic valve area equals 2.05 cm² by the continuity equation consistent with mild aortic stenosis. Trivial aortic valve regurgitation is seen.  Pulmonic Valve: Structurally normal pulmonic valve, with normal leaflet excursion. No indication of pulmonic valve regurgitation.  Aorta: The aortic root and ascending aorta are structurally normal, with no evidence of dilitation.  Pulmonary Artery: The main pulmonary artery is normal in size.      Summary:   1. Left ventricular ejection fraction, by visual estimation, is 60 to 65%.   2. Mild mitral valve regurgitation.   3. Mild tricuspid regurgitation.   4. Peak transaortic gradient equals 17.3 mmHg, mean transaortic gradient equals 7.4 mmHg, the calculated aortic valve area equals 2.05 cm² by the continuity equation consistent with mild aortic stenosis.    < end of copied text >    LABS:	 	    CARDIAC MARKERS:  Troponin I, Serum: <.015 ng/mL (10-05 @ 12:54)    07 Oct 2020 07:41    136    |  104    |  36     ----------------------------<  127    4.7     |  26     |  1.24   06 Oct 2020 17:31    137    |  106    |  33     ----------------------------<  112    5.2     |  28     |  1.36   06 Oct 2020 08:12    140    |  106    |  33     ----------------------------<  129    4.8     |  27     |  1.32     Ca    8.7        07 Oct 2020 07:41                        10.6   9.59  )-----------( 147      ( 07 Oct 2020 07:41 )             31.7 ,                       11.7   12.44 )-----------( 166      ( 06 Oct 2020 17:31 )             36.3 ,                       12.6   8.40  )-----------( 163      ( 06 Oct 2020 08:12 )             38.5 ,                       14.5   6.11  )-----------( 196      ( 05 Oct 2020 12:54 )             43.7   INR: 1.13 ratio (10-06 @ 08:12)  INR: 1.13 ratio (10-05 @ 12:54)

## 2020-10-07 NOTE — OCCUPATIONAL THERAPY INITIAL EVALUATION ADULT - PRECAUTIONS/LIMITATIONS, REHAB EVAL
obesity precautions/fall precautions/cardiac precautions/pain a and weakness in RLE. Monitor vital signs with activities due to dizziness and syncopal episode obesity precautions/fall precautions/Pt has pain and weakness in RLE. Monitor vital signs with activities due to dizziness and syncopal episode/cardiac precautions

## 2020-10-07 NOTE — PROGRESS NOTE ADULT - ASSESSMENT
86 y/o male with acute hip fracture and Afib. Patient evaluated by cardiology and deemed to have acceptable  cardiac risk for proscedure. POD 1    -Routine post surgical ortho care. pt eating and drinking well. pain controlled     UTI   UA ++ LE and nitrite but minimal wbc and epithelial cells. urcx neg   dc abx   afebrile     Afibb   -Echo reviewed  - continue cardio eval (Fefer) and possibility of anticoagulation. Pt  agreed to AC so would recommend a NOAC. He reports only falling this one time. understands risk of falling on AC   -PT eval

## 2020-10-07 NOTE — OCCUPATIONAL THERAPY INITIAL EVALUATION ADULT - LIVES WITH, PROFILE
spouse/in a private house on the first level with no steps to enter . All living amenities are located on one level. The bathroom has a tub/shower combination , fixed shower head and standard toilet.. Pt stated " I don not go into the tub; I give myself a sponge bath" spouse/in a private house on the first level with no steps to enter. All living amenities are located on one level. The bathroom has a tub/shower combination , fixed shower head and standard toilet.. Pt stated" I don't go into the tub; I give myself a sponge bath".

## 2020-10-07 NOTE — OCCUPATIONAL THERAPY INITIAL EVALUATION ADULT - ADDITIONAL COMMENTS
Prior to admission, pt was functioning in his roles, self sufficient & ambulating independently with a  rolling walker in the community. Presently pt needs assistance functional mobility and all level of self care tasks , except feeding, due to pain, weakness, stiffness and decreased ROM from right hip fracture and s/p IMN. Pt is right hand dominant and wears glasses for reading. Prior to admission, pt was functioning in his roles, self sufficient & ambulating independently with a  rolling walker in the community. Presently, pt needs assistance functional mobility and all levels of self care tasks , except feeding, due to pain, weakness, stiffness and decreased ROM from right hip fracture and s/p IMN. Pt is right hand dominant and wears glasses for reading.

## 2020-10-07 NOTE — OCCUPATIONAL THERAPY INITIAL EVALUATION ADULT - ANTICIPATED DISCHARGE DISPOSITION, OT EVAL
STR to prevent falls, improve balance, muscle strength, and endurance in order for the pt to perform ADL management and functional mobility in a safe manner.

## 2020-10-07 NOTE — PROGRESS NOTE ADULT - SUBJECTIVE AND OBJECTIVE BOX
Patient seen and examined at bedside, resting comfortably. No overnight events. Pain well controlled. Denies any numbness/tingling.  No other complaints at this time. Patient denies any CP/SOB    VITAL SIGNS  T(C): 36.6 (10-07-20 @ 05:30), Max: 36.7 (10-06-20 @ 16:15)  HR: 70 (10-07-20 @ 05:30) (70 - 102)  BP: 106/75 (10-07-20 @ 05:30) (95/70 - 137/84)  RR: 16 (10-07-20 @ 05:30) (14 - 19)  SpO2: 97% (10-07-20 @ 05:30) (95% - 100%)    LABS:                        11.7   12.44 )-----------( 166      ( 06 Oct 2020 17:31 )             36.3     10-06    137  |  106  |  33<H>  ----------------------------<  112<H>  5.2   |  28  |  1.36<H>    Ca    8.8      06 Oct 2020 17:31    TPro  7.3  /  Alb  3.7  /  TBili  0.6  /  DBili  x   /  AST  30  /  ALT  42  /  AlkPhos  90  10-05    PT/INR - ( 06 Oct 2020 08:12 )   PT: 13.0 sec;   INR: 1.13 ratio         PTT - ( 06 Oct 2020 08:12 )  PTT:28.6 sec      PHYSICAL EXAM  GEN: NAD  RLE:  Skin: Dressing clean/dry/intact  Compartments soft and compressible  Calves nontender  +TA/EHL/FHL/GSC  L2-S1 SILT  + DP pulses      Assessment:  85y Male s/p IMN POD #1    -Pain control  -DVT ppx, history of afib and will need anticoagulation  -Will reach out to cardiology team for recommendations regarding appropriate ppx  -WBAT/OOB with assistance as needed  -PT/OT  -Ice and elevate  -Encourage incentive spirometry  -DC planning  -Will discuss with attending and will advise if plan changes.

## 2020-10-07 NOTE — OCCUPATIONAL THERAPY INITIAL EVALUATION ADULT - STRENGTHENING, PT EVAL
Pt will increased BUE/LE muscle strength by 1 full grade to maximize independence with functional mobility and self care tasks within  8 weeks

## 2020-10-07 NOTE — OCCUPATIONAL THERAPY INITIAL EVALUATION ADULT - GENERAL OBSERVATIONS, REHAB EVAL
Pt was seen for initial OT consult, encountered in bed in NAD with IV infusing in RUE. Right dressing is intact . Nursing student was at  bedside. Pwas AA&Ox4, cooperative & followed commands. Pt c/o right hip pain due to s/p fracture and IMN; this limits pt's activity tolerance ,balance, ADL management and functional mobility. Pt was seen for initial OT consult, encountered in bed in NAD with IV infusing in RUE. Right dressing is intact . Nursing student was at  bedside. Pt was AA&Ox4, cooperative & followed commands. Pt c/o right hip pain due to s/p fracture and IMN; this limits pt's activity tolerance ,balance, ADL management and functional mobility.

## 2020-10-08 LAB
ANION GAP SERPL CALC-SCNC: 5 MMOL/L — SIGNIFICANT CHANGE UP (ref 5–17)
BUN SERPL-MCNC: 40 MG/DL — HIGH (ref 7–23)
CALCIUM SERPL-MCNC: 8.6 MG/DL — SIGNIFICANT CHANGE UP (ref 8.5–10.1)
CHLORIDE SERPL-SCNC: 103 MMOL/L — SIGNIFICANT CHANGE UP (ref 96–108)
CO2 SERPL-SCNC: 27 MMOL/L — SIGNIFICANT CHANGE UP (ref 22–31)
CREAT SERPL-MCNC: 1.23 MG/DL — SIGNIFICANT CHANGE UP (ref 0.5–1.3)
GLUCOSE SERPL-MCNC: 112 MG/DL — HIGH (ref 70–99)
HCT VFR BLD CALC: 28 % — LOW (ref 39–50)
HGB BLD-MCNC: 9.3 G/DL — LOW (ref 13–17)
MCHC RBC-ENTMCNC: 31.6 PG — SIGNIFICANT CHANGE UP (ref 27–34)
MCHC RBC-ENTMCNC: 33.2 GM/DL — SIGNIFICANT CHANGE UP (ref 32–36)
MCV RBC AUTO: 95.2 FL — SIGNIFICANT CHANGE UP (ref 80–100)
NRBC # BLD: 0 /100 WBCS — SIGNIFICANT CHANGE UP (ref 0–0)
PLATELET # BLD AUTO: 141 K/UL — LOW (ref 150–400)
POTASSIUM SERPL-MCNC: 4.4 MMOL/L — SIGNIFICANT CHANGE UP (ref 3.5–5.3)
POTASSIUM SERPL-SCNC: 4.4 MMOL/L — SIGNIFICANT CHANGE UP (ref 3.5–5.3)
RBC # BLD: 2.94 M/UL — LOW (ref 4.2–5.8)
RBC # FLD: 13.8 % — SIGNIFICANT CHANGE UP (ref 10.3–14.5)
SODIUM SERPL-SCNC: 135 MMOL/L — SIGNIFICANT CHANGE UP (ref 135–145)
WBC # BLD: 9.5 K/UL — SIGNIFICANT CHANGE UP (ref 3.8–10.5)
WBC # FLD AUTO: 9.5 K/UL — SIGNIFICANT CHANGE UP (ref 3.8–10.5)

## 2020-10-08 PROCEDURE — 99232 SBSQ HOSP IP/OBS MODERATE 35: CPT

## 2020-10-08 PROCEDURE — 99233 SBSQ HOSP IP/OBS HIGH 50: CPT

## 2020-10-08 RX ORDER — HYDROMORPHONE HYDROCHLORIDE 2 MG/ML
0.5 INJECTION INTRAMUSCULAR; INTRAVENOUS; SUBCUTANEOUS EVERY 6 HOURS
Refills: 0 | Status: DISCONTINUED | OUTPATIENT
Start: 2020-10-08 | End: 2020-10-11

## 2020-10-08 RX ORDER — RIVAROXABAN 15 MG-20MG
10 KIT ORAL DAILY
Refills: 0 | Status: DISCONTINUED | OUTPATIENT
Start: 2020-10-08 | End: 2020-10-09

## 2020-10-08 RX ORDER — ACETAMINOPHEN 500 MG
650 TABLET ORAL ONCE
Refills: 0 | Status: COMPLETED | OUTPATIENT
Start: 2020-10-08 | End: 2020-10-08

## 2020-10-08 RX ADMIN — HYDROMORPHONE HYDROCHLORIDE 0.5 MILLIGRAM(S): 2 INJECTION INTRAMUSCULAR; INTRAVENOUS; SUBCUTANEOUS at 19:00

## 2020-10-08 RX ADMIN — SODIUM CHLORIDE 70 MILLILITER(S): 9 INJECTION, SOLUTION INTRAVENOUS at 10:39

## 2020-10-08 RX ADMIN — HYDROMORPHONE HYDROCHLORIDE 0.5 MILLIGRAM(S): 2 INJECTION INTRAMUSCULAR; INTRAVENOUS; SUBCUTANEOUS at 10:38

## 2020-10-08 RX ADMIN — Medication 1 TABLET(S): at 12:39

## 2020-10-08 RX ADMIN — HYDROMORPHONE HYDROCHLORIDE 0.5 MILLIGRAM(S): 2 INJECTION INTRAMUSCULAR; INTRAVENOUS; SUBCUTANEOUS at 11:00

## 2020-10-08 RX ADMIN — Medication 650 MILLIGRAM(S): at 03:10

## 2020-10-08 RX ADMIN — Medication 650 MILLIGRAM(S): at 02:32

## 2020-10-08 RX ADMIN — RIVAROXABAN 10 MILLIGRAM(S): KIT at 18:42

## 2020-10-08 RX ADMIN — Medication 81 MILLIGRAM(S): at 12:39

## 2020-10-08 RX ADMIN — Medication 1 MILLIGRAM(S): at 12:39

## 2020-10-08 RX ADMIN — ENOXAPARIN SODIUM 40 MILLIGRAM(S): 100 INJECTION SUBCUTANEOUS at 12:39

## 2020-10-08 RX ADMIN — HYDROMORPHONE HYDROCHLORIDE 0.5 MILLIGRAM(S): 2 INJECTION INTRAMUSCULAR; INTRAVENOUS; SUBCUTANEOUS at 18:38

## 2020-10-08 RX ADMIN — Medication 500 MILLIGRAM(S): at 10:39

## 2020-10-08 RX ADMIN — Medication 500 MILLIGRAM(S): at 18:41

## 2020-10-08 NOTE — PROGRESS NOTE ADULT - SUBJECTIVE AND OBJECTIVE BOX
Patient is a 85y old  Male who presents with a chief complaint of right hip fx (08 Oct 2020 13:23)    PAST MEDICAL & SURGICAL HISTORY:  Afib  HTN    INTERVAL HISTORY: Resting in bed, in no acute distress   	  MEDICATIONS:  MEDICATIONS  (STANDING):  acetaminophen  IVPB .. 1000 milliGRAM(s) IV Intermittent once  ascorbic acid 500 milliGRAM(s) Oral two times a day  aspirin  chewable 81 milliGRAM(s) Oral daily  enoxaparin Injectable 40 milliGRAM(s) SubCutaneous daily  folic acid 1 milliGRAM(s) Oral daily  multivitamin 1 Tablet(s) Oral daily    MEDICATIONS  (PRN):  benzocaine 15 mG/menthol 3.6 mG (Sugar-Free) Lozenge 1 Lozenge Oral every 8 hours PRN Sore Throat  HYDROmorphone  Injectable 0.5 milliGRAM(s) IV Push every 6 hours PRN Severe Pain (7 - 10)  melatonin 3 milliGRAM(s) Oral at bedtime PRN Sleep  ondansetron Injectable 4 milliGRAM(s) IV Push every 6 hours PRN Nausea and/or Vomiting  oxyCODONE    IR 10 milliGRAM(s) Oral every 4 hours PRN Moderate Pain (4 - 6)  oxyCODONE    IR 5 milliGRAM(s) Oral every 4 hours PRN Mild Pain (1 - 3)    Vitals:  T(F): 98 (10-08-20 @ 12:13), Max: 98.8 (10-07-20 @ 17:16)  HR: 91 (10-08-20 @ 12:13) (80 - 99)  BP: 96/62 (10-08-20 @ 12:13) (96/62 - 131/78)  RR: 17 (10-08-20 @ 12:13) (17 - 18)  SpO2: 98% (10-08-20 @ 12:13) (95% - 98%)    10-07 @ 07:01  -  10-08 @ 07:00  --------------------------------------------------------  IN:    Lactated Ringers: 900 mL  Total IN: 900 mL    OUT:    Voided (mL): 550 mL  Total OUT: 550 mL    Total NET: 350 mL    Weight (kg): 98.8 (10-06 @ 14:12)  BMI (kg/m2): 27.2 (10-06 @ 14:12)    PHYSICAL EXAM:  Neuro: Awake, responsive  CV: S1 S2 irregular + SM  Lungs: diminished to bases  GI: Soft, BS +, ND, NT  Extremities: Rt hip dressing intact       RADIOLOGY:   < from: Xray Chest 1 View- PORTABLE-Urgent (Xray Chest 1 View- PORTABLE-Urgent .) (10.05.20 @ 14:15) >  The heart is normal in size. The lungs are clear. No pleural effusion. No pneumothorax. Degenerative changes of the thoracic spine.    < end of copied text >    DIAGNOSTIC TESTING:    [x ] Echocardiogram: < from: TTE Echo Complete w/o Contrast w/ Doppler (10.06.20 @ 09:02) >  Left Ventricle: Normal left ventricular size and wall thicknesses, with normal systolic and diastolic function.  Left ventricular ejection fraction, by visual estimation, is 60 to 65%.  Right Ventricle: Normal right ventricular size and function.  Left Atrium: The left atrium is normal in size.  Right Atrium: The right atrium is normal in size.  Pericardium: There is no evidence of pericardial effusion.  Mitral Valve: Structurally normal mitral valve, with normal leaflet excursion. Mild mitral valve regurgitation is seen.  Tricuspid Valve: Structurally normal tricuspid valve, with normal leaflet excursion. Mild tricuspid regurgitation is visualized.  Aortic Valve: Normal trileaflet aortic valve with normal opening. Peak transaortic gradient equals 17.3 mmHg, mean transaortic gradient equals 7.4 mmHg, the calculated aortic valve area equals 2.05 cm² by the continuity equation consistent with mild aortic stenosis. Trivial aortic valve regurgitation is seen.  Pulmonic Valve: Structurally normal pulmonic valve, with normal leaflet excursion. No indication of pulmonic valve regurgitation.  Aorta: The aortic root and ascending aorta are structurally normal, with no evidence of dilitation.  Pulmonary Artery: The main pulmonary artery is normal in size.      Summary:   1. Left ventricular ejection fraction, by visual estimation, is 60 to 65%.   2. Mild mitral valve regurgitation.   3. Mild tricuspid regurgitation.   4. Peak transaortic gradient equals 17.3 mmHg, mean transaortic gradient equals 7.4 mmHg, the calculated aortic valve area equals 2.05 cm² by the continuity equation consistent with mild aortic stenosis.    < end of copied text >    LABS:	 	    08 Oct 2020 06:37    135    |  103    |  40     ----------------------------<  112    4.4     |  27     |  1.23   07 Oct 2020 07:41    136    |  104    |  36     ----------------------------<  127    4.7     |  26     |  1.24   06 Oct 2020 17:31    137    |  106    |  33     ----------------------------<  112    5.2     |  28     |  1.36     Ca    8.6        08 Oct 2020 06:37                        9.3    9.50  )-----------( 141      ( 08 Oct 2020 06:37 )             28.0 ,                       10.6   9.59  )-----------( 147      ( 07 Oct 2020 07:41 )             31.7 ,                       11.7   12.44 )-----------( 166      ( 06 Oct 2020 17:31 )             36.3 ,                       12.6   8.40  )-----------( 163      ( 06 Oct 2020 08:12 )             38.5     INR: 1.13 ratio (10-06 @ 08:12)

## 2020-10-08 NOTE — PROGRESS NOTE ADULT - ASSESSMENT
84 y/o male with acute hip fracture and Afib. Patient evaluated by cardiology and deemed to have acceptable  cardiac risk for proscedure. POD 2    -Routine post surgical ortho care. pt eating and drinking well. pain controlled     UTI   UA ++ LE and nitrite but minimal wbc and epithelial cells. ucx neg. remains afebrile   dc abx   afebrile    acute blood loss anemia. 2/2 surgery   - trend cbc. no bloody stool      Afibb   -Echo reviewed  - Pt  agreed to AC so would recommend a NOAC. He reports only falling this one time. understands risk of falling on AC   - cardiology agrees with starting NOAC  once cbc is stable

## 2020-10-08 NOTE — PROGRESS NOTE ADULT - SUBJECTIVE AND OBJECTIVE BOX
HPI:  85M otherwise healthy presents to the ED for right hip pain following a mechanical fall. Pt was sitting down in a chair when he missed the chair falling onto his right hip, he experienced immediate right hip pain and swelling. He denies hit to the head, LOC, any other orthopedic injuries. Pt denies SOB, CP, calf pain. (05 Oct 2020 16:02)    Patient is a 85y old  Male who presents with a chief complaint of right hip fx (06 Oct 2020 17:16)      INTERVAL HPI/OVERNIGHT EVENTS: POD 2 s/p IMN placement. afebrile     MEDICATIONS  (STANDING):  acetaminophen  IVPB .. 1000 milliGRAM(s) IV Intermittent once  ascorbic acid 500 milliGRAM(s) Oral two times a day  aspirin  chewable 81 milliGRAM(s) Oral daily  enoxaparin Injectable 40 milliGRAM(s) SubCutaneous daily  folic acid 1 milliGRAM(s) Oral daily  multivitamin 1 Tablet(s) Oral daily    MEDICATIONS  (PRN):  benzocaine 15 mG/menthol 3.6 mG (Sugar-Free) Lozenge 1 Lozenge Oral every 8 hours PRN Sore Throat  HYDROmorphone  Injectable 0.5 milliGRAM(s) IV Push every 6 hours PRN Severe Pain (7 - 10)  melatonin 3 milliGRAM(s) Oral at bedtime PRN Sleep  ondansetron Injectable 4 milliGRAM(s) IV Push every 6 hours PRN Nausea and/or Vomiting  oxyCODONE    IR 10 milliGRAM(s) Oral every 4 hours PRN Moderate Pain (4 - 6)  oxyCODONE    IR 5 milliGRAM(s) Oral every 4 hours PRN Mild Pain (1 - 3)          Allergies    No Known Allergies    Intolerances        REVIEW OF SYSTEMS:  CONSTITUTIONAL: No fever, weight loss, or fatigue  EYES: No eye pain, visual disturbances, or discharge  ENMT:  No difficulty hearing, tinnitus, vertigo; No sinus or throat pain  NECK: No pain or stiffness  BREASTS: No pain, masses, or nipple discharge  RESPIRATORY: No cough, wheezing, chills or hemoptysis; No shortness of breath  CARDIOVASCULAR: No chest pain, palpitations, dizziness, or leg swelling  GASTROINTESTINAL: No abdominal or epigastric pain. No nausea, vomiting, or hematemesis; No diarrhea or constipation. No melena or hematochezia.  GENITOURINARY: No dysuria, frequency, hematuria, or incontinence  NEUROLOGICAL: No headaches, memory loss, loss of strength, numbness, or tremors  SKIN: No itching, burning, rashes, or lesions   LYMPH NODES: No enlarged glands  ENDOCRINE: No heat or cold intolerance; No hair loss  MUSCULOSKELETAL: No joint pain or swelling; No muscle, back, or extremity pain  PSYCHIATRIC: No depression, anxiety, mood swings, or difficulty sleeping  HEME/LYMPH: No easy bruising, or bleeding gums  ALLERGY AND IMMUNOLOGIC: No hives or eczema    Vital Signs Last 24 Hrs  T(C): 36.7 (08 Oct 2020 12:13), Max: 37.1 (07 Oct 2020 17:16)  T(F): 98 (08 Oct 2020 12:13), Max: 98.8 (07 Oct 2020 17:16)  HR: 91 (08 Oct 2020 12:13) (80 - 99)  BP: 96/62 (08 Oct 2020 12:13) (96/62 - 131/78)  BP(mean): --  RR: 17 (08 Oct 2020 12:13) (17 - 18)  SpO2: 98% (08 Oct 2020 12:13) (95% - 98%)    PHYSICAL EXAM:  GENERAL: NAD, well-groomed, well-developed  HEAD:  Atraumatic, Normocephalic  EYES: EOMI, PERRLA, conjunctiva and sclera clear  ENMT: No tonsillar erythema, exudates, or enlargement; Moist mucous membranes, Good dentition, No lesions  NECK: Supple, No JVD, Normal thyroid  NERVOUS SYSTEM:  Alert & Oriented X3, Good concentration; Motor Strength 5/5 B/L upper and lower extremities; DTRs 2+ intact and symmetric  CHEST/LUNG: Clear to percussion bilaterally; No rales, rhonchi, wheezing, or rubs  HEART: Regular rate and rhythm; No murmurs, rubs, or gallops  ABDOMEN: Soft, Nontender, Nondistended; Bowel sounds present  EXTREMITIES:  right leg wound dressed   LYMPH: No lymphadenopathy noted  SKIN: No rashes or lesions    LABS:                                                                9.3    9.50  )-----------( 141      ( 08 Oct 2020 06:37 )             28.0     10-08    135  |  103  |  40<H>  ----------------------------<  112<H>  4.4   |  27  |  1.23    Ca    8.6      08 Oct 2020 06:37            Color: Yellow / Appearance: Clear / S.025 / pH: x  Gluc: x / Ketone: Moderate  / Bili: Negative / Urobili: Negative mg/dL   Blood: x / Protein: 30 mg/dL / Nitrite: Positive   Leuk Esterase: Trace / RBC: 6-10 /HPF / WBC 3-5   Sq Epi: x / Non Sq Epi: Occasional / Bacteria: Few          CAPILLARY BLOOD GLUCOSE          RADIOLOGY & ADDITIONAL TESTS:  < from: TTE Echo Complete w/o Contrast w/ Doppler (10.06.20 @ 09:02) >  Summary:   1. Left ventricular ejection fraction, by visual estimation, is 60 to 65%.   2. Mild mitral valve regurgitation.   3. Mild tricuspid regurgitation.   4. Peak transaortic gradient equals 17.3 mmHg, mean transaortic gradient equals 7.4 mmHg, the calculated aortic valve area equals 2.05 cm² by the continuity equation consistent with mild aortic stenosis.    < end of copied text >    Imaging Personally Reviewed:  [X ] YES  [ ] NO    Consultant(s) Notes Reviewed:  [X ] YES  [ ] NO    Care Discussed with Consultants/Other Providers [ X] YES  [ ] NO

## 2020-10-08 NOTE — PROGRESS NOTE ADULT - ASSESSMENT
86yo male s/p mechanical fall with right intertrochanteric fracture. H/o AFib and HTN, rates adequately controlled.  No recent chest pain or dyspnea.  Echo with preserved EF, mild MR/TR/AS    s/p Intramedullary nailing of femur 06-Oct-2020     Plan    Post op management as per ortho team  pain control  HR relatively stable   BP on low side, asymptomatic  monitor h/h, Hct  43-> 31.7 ->28 now  Can start on DOAC for AFib for stroke reduction, once cleared by ortho and h/h  stabilized   Outpatient cardiology follow up with Dr. Gill

## 2020-10-08 NOTE — PROGRESS NOTE ADULT - SUBJECTIVE AND OBJECTIVE BOX
Patient seen and examined at bedside, resting comfortably. No overnight events. Pain well controlled. Denies any numbness/tingling.  No other complaints at this time. Patient denies any CP/SOB    LABS:                        9.3    9.50  )-----------( 141      ( 08 Oct 2020 06:37 )             28.0     10-08    135  |  103  |  40<H>  ----------------------------<  112<H>  4.4   |  27  |  1.23    Ca    8.6      08 Oct 2020 06:37        PHYSICAL EXAM  GEN: NAD, aaox3    RLE:  Skin: Dressing clean/dry/intact  Compartments soft and compressible  Calves nontender  +TA/EHL/FHL/GSC  L2-S1 SILT  + DP pulses

## 2020-10-08 NOTE — PROGRESS NOTE ADULT - ASSESSMENT
Assessment:  85y Male s/p IMN POD #2    -Pain control  -DVT ppx, history of afib and will need anticoagulation  -Will reach out to cardiology team for recommendations regarding appropriate ppx  -WBAT/OOB with assistance as needed  -PT/OT  -Ice and elevate  -Encourage incentive spirometry  -DC planning  -Will discuss with attending and will advise if plan changes.

## 2020-10-09 LAB
ANION GAP SERPL CALC-SCNC: 3 MMOL/L — LOW (ref 5–17)
BUN SERPL-MCNC: 33 MG/DL — HIGH (ref 7–23)
CALCIUM SERPL-MCNC: 8.6 MG/DL — SIGNIFICANT CHANGE UP (ref 8.5–10.1)
CHLORIDE SERPL-SCNC: 103 MMOL/L — SIGNIFICANT CHANGE UP (ref 96–108)
CO2 SERPL-SCNC: 31 MMOL/L — SIGNIFICANT CHANGE UP (ref 22–31)
CREAT SERPL-MCNC: 0.97 MG/DL — SIGNIFICANT CHANGE UP (ref 0.5–1.3)
GLUCOSE SERPL-MCNC: 123 MG/DL — HIGH (ref 70–99)
HCT VFR BLD CALC: 28 % — LOW (ref 39–50)
HGB BLD-MCNC: 8.9 G/DL — LOW (ref 13–17)
MCHC RBC-ENTMCNC: 31.1 PG — SIGNIFICANT CHANGE UP (ref 27–34)
MCHC RBC-ENTMCNC: 31.8 GM/DL — LOW (ref 32–36)
MCV RBC AUTO: 97.9 FL — SIGNIFICANT CHANGE UP (ref 80–100)
NRBC # BLD: 0 /100 WBCS — SIGNIFICANT CHANGE UP (ref 0–0)
PLATELET # BLD AUTO: 163 K/UL — SIGNIFICANT CHANGE UP (ref 150–400)
POTASSIUM SERPL-MCNC: 4.7 MMOL/L — SIGNIFICANT CHANGE UP (ref 3.5–5.3)
POTASSIUM SERPL-SCNC: 4.7 MMOL/L — SIGNIFICANT CHANGE UP (ref 3.5–5.3)
RBC # BLD: 2.86 M/UL — LOW (ref 4.2–5.8)
RBC # FLD: 13.9 % — SIGNIFICANT CHANGE UP (ref 10.3–14.5)
SARS-COV-2 RNA SPEC QL NAA+PROBE: SIGNIFICANT CHANGE UP
SODIUM SERPL-SCNC: 137 MMOL/L — SIGNIFICANT CHANGE UP (ref 135–145)
WBC # BLD: 9.08 K/UL — SIGNIFICANT CHANGE UP (ref 3.8–10.5)
WBC # FLD AUTO: 9.08 K/UL — SIGNIFICANT CHANGE UP (ref 3.8–10.5)

## 2020-10-09 PROCEDURE — 99232 SBSQ HOSP IP/OBS MODERATE 35: CPT

## 2020-10-09 PROCEDURE — 74018 RADEX ABDOMEN 1 VIEW: CPT | Mod: 26

## 2020-10-09 RX ORDER — FAMOTIDINE 10 MG/ML
20 INJECTION INTRAVENOUS ONCE
Refills: 0 | Status: COMPLETED | OUTPATIENT
Start: 2020-10-09 | End: 2020-10-09

## 2020-10-09 RX ORDER — OXYCODONE HYDROCHLORIDE 5 MG/1
1 TABLET ORAL
Qty: 0 | Refills: 0 | DISCHARGE
Start: 2020-10-09

## 2020-10-09 RX ORDER — CALCIUM CARBONATE 500(1250)
2 TABLET ORAL
Qty: 0 | Refills: 0 | DISCHARGE
Start: 2020-10-09

## 2020-10-09 RX ORDER — CALCIUM CARBONATE 500(1250)
2 TABLET ORAL EVERY 4 HOURS
Refills: 0 | Status: DISCONTINUED | OUTPATIENT
Start: 2020-10-09 | End: 2020-10-23

## 2020-10-09 RX ORDER — RIVAROXABAN 15 MG-20MG
1 KIT ORAL
Qty: 0 | Refills: 0 | DISCHARGE
Start: 2020-10-09

## 2020-10-09 RX ORDER — RIVAROXABAN 15 MG-20MG
20 KIT ORAL DAILY
Refills: 0 | Status: DISCONTINUED | OUTPATIENT
Start: 2020-10-10 | End: 2020-10-12

## 2020-10-09 RX ORDER — MULTIVIT WITH MIN/MFOLATE/K2 340-15/3 G
1 POWDER (GRAM) ORAL ONCE
Refills: 0 | Status: COMPLETED | OUTPATIENT
Start: 2020-10-09 | End: 2020-10-09

## 2020-10-09 RX ADMIN — RIVAROXABAN 10 MILLIGRAM(S): KIT at 12:13

## 2020-10-09 RX ADMIN — OXYCODONE HYDROCHLORIDE 10 MILLIGRAM(S): 5 TABLET ORAL at 20:29

## 2020-10-09 RX ADMIN — Medication 500 MILLIGRAM(S): at 17:16

## 2020-10-09 RX ADMIN — FAMOTIDINE 20 MILLIGRAM(S): 10 INJECTION INTRAVENOUS at 14:51

## 2020-10-09 RX ADMIN — HYDROMORPHONE HYDROCHLORIDE 0.5 MILLIGRAM(S): 2 INJECTION INTRAMUSCULAR; INTRAVENOUS; SUBCUTANEOUS at 21:21

## 2020-10-09 RX ADMIN — ONDANSETRON 4 MILLIGRAM(S): 8 TABLET, FILM COATED ORAL at 10:00

## 2020-10-09 RX ADMIN — HYDROMORPHONE HYDROCHLORIDE 0.5 MILLIGRAM(S): 2 INJECTION INTRAMUSCULAR; INTRAVENOUS; SUBCUTANEOUS at 05:59

## 2020-10-09 RX ADMIN — OXYCODONE HYDROCHLORIDE 10 MILLIGRAM(S): 5 TABLET ORAL at 21:21

## 2020-10-09 RX ADMIN — Medication 3 MILLIGRAM(S): at 00:02

## 2020-10-09 RX ADMIN — Medication 500 MILLIGRAM(S): at 05:29

## 2020-10-09 RX ADMIN — HYDROMORPHONE HYDROCHLORIDE 0.5 MILLIGRAM(S): 2 INJECTION INTRAMUSCULAR; INTRAVENOUS; SUBCUTANEOUS at 21:51

## 2020-10-09 RX ADMIN — HYDROMORPHONE HYDROCHLORIDE 0.5 MILLIGRAM(S): 2 INJECTION INTRAMUSCULAR; INTRAVENOUS; SUBCUTANEOUS at 05:29

## 2020-10-09 RX ADMIN — BENZOCAINE AND MENTHOL 1 LOZENGE: 5; 1 LIQUID ORAL at 05:29

## 2020-10-09 RX ADMIN — Medication 1 BOTTLE: at 13:25

## 2020-10-09 NOTE — PROGRESS NOTE ADULT - ASSESSMENT
86yo male s/p mechanical fall with right intertrochanteric fracture. H/o AFib and HTN, rates adequately controlled.  No recent chest pain or dyspnea.  Echo with preserved EF, mild MR/TR/AS    s/p Intramedullary nailing of femur 06-Oct-2020  C/o nausea and vomiting this am, also with constipation     Plan    Post op management as per ortho team  pain control  HR relatively stable, BP on low side, asymptomatic  monitor h/h, Hct  43-> 31.7 ->28 now  GI management for nausea/vomiting/constipation as per primary team  Can start on full dose DOAC for AFib for stroke reduction, once  h/h  stabilized, currently on DVT dose   Followup up in Dr. Taylor's office, cardiology within 1-2 weeks or after rehab.    86yo male s/p mechanical fall with right intertrochanteric fracture. H/o AFib and HTN, rates adequately controlled.  No recent chest pain or dyspnea.  Echo with preserved EF, mild MR/TR/AS    s/p Intramedullary nailing of femur 06-Oct-2020  C/o nausea and vomiting this am, also with constipation     Plan    Post op management as per ortho team  pain control  HR relatively stable, BP on low side, asymptomatic  monitor h/h, Hct  43-> 31.7 ->28 now  GI management for nausea/vomiting/constipation as per primary team  Can start on full dose DOAC for AFib for stroke reduction, once  h/h  stabilized, currently on DVT dose   Signing off now, please reconsult as needed   Followup up in Dr. Taylor's office, cardiology, within 1-2 weeks or after rehab.

## 2020-10-09 NOTE — PROGRESS NOTE ADULT - ASSESSMENT
86 y/o male with acute hip fracture and Afib. Patient evaluated by cardiology and deemed to have acceptable  cardiac risk for proscedure. POD 2    -Routine post surgical ortho care. pt eating and drinking well. pain controlled     UTI   UA ++ LE and nitrite but minimal wbc and epithelial cells. ucx neg. remains afebrile   dc abx   afebrile    acute blood loss anemia. 2/2 surgery   - slowly trending down   - trend cbc. no bloody stool      Afibb   -Echo reviewed  - Pt  agreed to AC so would recommend a NOAC. He reports only falling this one time. understands risk of falling on AC   - cardiology agrees with starting NOAC  once cbc is stable. He currently is on dvt dose but should be switched to xarelto 20 mg daily when cbc stabilizes

## 2020-10-09 NOTE — PROGRESS NOTE ADULT - SUBJECTIVE AND OBJECTIVE BOX
Patient is a 85y old  Male who presents with a chief complaint of right hip fx (09 Oct 2020 08:36)    PAST MEDICAL & SURGICAL HISTORY:  No pertinent past medical history    No significant past surgical history    INTERVAL HISTORY: Resting in bed, episodes of nausea/vomiting this am, also c/o constipation   	  MEDICATIONS:  MEDICATIONS  (STANDING):  acetaminophen  IVPB .. 1000 milliGRAM(s) IV Intermittent once  ascorbic acid 500 milliGRAM(s) Oral two times a day  folic acid 1 milliGRAM(s) Oral daily  multivitamin 1 Tablet(s) Oral daily  rivaroxaban 10 milliGRAM(s) Oral daily    MEDICATIONS  (PRN):  benzocaine 15 mG/menthol 3.6 mG (Sugar-Free) Lozenge 1 Lozenge Oral every 8 hours PRN Sore Throat  calcium carbonate    500 mG (Tums) Chewable 2 Tablet(s) Chew every 4 hours PRN Heartburn  guaiFENesin   Syrup  (Sugar-Free) 200 milliGRAM(s) Oral every 6 hours PRN Cough  HYDROmorphone  Injectable 0.5 milliGRAM(s) IV Push every 6 hours PRN Severe Pain (7 - 10)  melatonin 3 milliGRAM(s) Oral at bedtime PRN Sleep  ondansetron Injectable 4 milliGRAM(s) IV Push every 6 hours PRN Nausea and/or Vomiting  oxyCODONE    IR 10 milliGRAM(s) Oral every 4 hours PRN Moderate Pain (4 - 6)  oxyCODONE    IR 5 milliGRAM(s) Oral every 4 hours PRN Mild Pain (1 - 3)      Vitals:  T(F): 97.5 (10-09-20 @ 17:28), Max: 98.4 (10-09-20 @ 05:42)  HR: 78 (10-09-20 @ 17:28) (64 - 105)  BP: 93/61 (10-09-20 @ 17:28) (93/61 - 129/55)  RR: 18 (10-09-20 @ 17:28) (17 - 18)  SpO2: 98% (10-09-20 @ 17:28) (94% - 98%)    10-08 @ 07:01  -  10-09 @ 07:00  --------------------------------------------------------  IN:  Total IN: 0 mL    OUT:    Voided (mL): 850 mL  Total OUT: 850 mL    Total NET: -850 mL    10-09 @ 07:01  -  10-09 @ 18:11  --------------------------------------------------------  IN:    Oral Fluid: 300 mL  Total IN: 300 mL    OUT:    Voided (mL): 300 mL  Total OUT: 300 mL    Total NET: 0 mL    PHYSICAL EXAM:  Neuro: Awake, responsive  CV: S1 S2 RRR  Lungs: CTABL  GI: Soft, BS +, ND, NT  Extremities: Rt hip dressing intact     RADIOLOGY:   < from: Xray Chest 1 View- PORTABLE-Urgent (Xray Chest 1 View- PORTABLE-Urgent .) (10.05.20 @ 14:15) >  The heart is normal in size. The lungs are clear. No pleural effusion. No pneumothorax. Degenerative changes of the thoracic spine.    < end of copied text >    DIAGNOSTIC TESTING:    [x ] Echocardiogram:     < from: TTE Echo Complete w/o Contrast w/ Doppler (10.06.20 @ 09:02) >  Left Ventricle: Normal left ventricular size and wall thicknesses, with normal systolic and diastolic function.  Left ventricular ejection fraction, by visual estimation, is 60 to 65%.  Right Ventricle: Normal right ventricular size and function.  Left Atrium: The left atrium is normal in size.  Right Atrium: The right atrium is normal in size.  Pericardium: There is no evidence of pericardial effusion.  Mitral Valve: Structurally normal mitral valve, with normal leaflet excursion. Mild mitral valve regurgitation is seen.  Tricuspid Valve: Structurally normal tricuspid valve, with normal leaflet excursion. Mild tricuspid regurgitation is visualized.  Aortic Valve: Normal trileaflet aortic valve with normal opening. Peak transaortic gradient equals 17.3 mmHg, mean transaortic gradient equals 7.4 mmHg, the calculated aortic valve area equals 2.05 cm² by the continuity equation consistent with mild aortic stenosis. Trivial aortic valve regurgitation is seen.  Pulmonic Valve: Structurally normal pulmonic valve, with normal leaflet excursion. No indication of pulmonic valve regurgitation.  Aorta: The aortic root and ascending aorta are structurally normal, with no evidence of dilitation.  Pulmonary Artery: The main pulmonary artery is normal in size.      Summary:   1. Left ventricular ejection fraction, by visual estimation, is 60 to 65%.   2. Mild mitral valve regurgitation.   3. Mild tricuspid regurgitation.   4. Peak transaortic gradient equals 17.3 mmHg, mean transaortic gradient equals 7.4 mmHg, the calculated aortic valve area equals 2.05 cm² by the continuity equation consistent with mild aortic stenosis.    < end of copied text >  LABS:	 	    09 Oct 2020 06:43    137    |  103    |  33     ----------------------------<  123    4.7     |  31     |  0.97   08 Oct 2020 06:37    135    |  103    |  40     ----------------------------<  112    4.4     |  27     |  1.23   07 Oct 2020 07:41    136    |  104    |  36     ----------------------------<  127    4.7     |  26     |  1.24     Ca    8.6        09 Oct 2020 06:43                        8.9    9.08  )-----------( 163      ( 09 Oct 2020 06:43 )             28.0 ,                       9.3    9.50  )-----------( 141      ( 08 Oct 2020 06:37 )             28.0 ,                       10.6   9.59  )-----------( 147      ( 07 Oct 2020 07:41 )             31.7                Patient is a 85y old  Male who presents with a chief complaint of right hip fx (09 Oct 2020 08:36)    PAST MEDICAL & SURGICAL HISTORY:  No pertinent past medical history    No significant past surgical history    INTERVAL HISTORY: Resting in bed, episodes of nausea/vomiting this am, also c/o constipation   	  MEDICATIONS:  MEDICATIONS  (STANDING):  acetaminophen  IVPB .. 1000 milliGRAM(s) IV Intermittent once  ascorbic acid 500 milliGRAM(s) Oral two times a day  folic acid 1 milliGRAM(s) Oral daily  multivitamin 1 Tablet(s) Oral daily  rivaroxaban 10 milliGRAM(s) Oral daily    MEDICATIONS  (PRN):  benzocaine 15 mG/menthol 3.6 mG (Sugar-Free) Lozenge 1 Lozenge Oral every 8 hours PRN Sore Throat  calcium carbonate    500 mG (Tums) Chewable 2 Tablet(s) Chew every 4 hours PRN Heartburn  guaiFENesin   Syrup  (Sugar-Free) 200 milliGRAM(s) Oral every 6 hours PRN Cough  HYDROmorphone  Injectable 0.5 milliGRAM(s) IV Push every 6 hours PRN Severe Pain (7 - 10)  melatonin 3 milliGRAM(s) Oral at bedtime PRN Sleep  ondansetron Injectable 4 milliGRAM(s) IV Push every 6 hours PRN Nausea and/or Vomiting  oxyCODONE    IR 10 milliGRAM(s) Oral every 4 hours PRN Moderate Pain (4 - 6)  oxyCODONE    IR 5 milliGRAM(s) Oral every 4 hours PRN Mild Pain (1 - 3)    Vitals:  T(F): 97.5 (10-09-20 @ 17:28), Max: 98.4 (10-09-20 @ 05:42)  HR: 78 (10-09-20 @ 17:28) (64 - 105)  BP: 93/61 (10-09-20 @ 17:28) (93/61 - 129/55)  RR: 18 (10-09-20 @ 17:28) (17 - 18)  SpO2: 98% (10-09-20 @ 17:28) (94% - 98%)    10-08 @ 07:01  -  10-09 @ 07:00  --------------------------------------------------------  IN:  Total IN: 0 mL    OUT:    Voided (mL): 850 mL  Total OUT: 850 mL    Total NET: -850 mL    10-09 @ 07:01  -  10-09 @ 18:11  --------------------------------------------------------  IN:    Oral Fluid: 300 mL  Total IN: 300 mL    OUT:    Voided (mL): 300 mL  Total OUT: 300 mL    Total NET: 0 mL    PHYSICAL EXAM:  Neuro: Awake, responsive  CV: S1 S2 RRR  Lungs: CTABL  GI: Soft, BS +, ND, NT  Extremities: Rt hip dressing intact     RADIOLOGY:   < from: Xray Chest 1 View- PORTABLE-Urgent (Xray Chest 1 View- PORTABLE-Urgent .) (10.05.20 @ 14:15) >  The heart is normal in size. The lungs are clear. No pleural effusion. No pneumothorax. Degenerative changes of the thoracic spine.    < end of copied text >    DIAGNOSTIC TESTING:    [x ] Echocardiogram:     < from: TTE Echo Complete w/o Contrast w/ Doppler (10.06.20 @ 09:02) >  Left Ventricle: Normal left ventricular size and wall thicknesses, with normal systolic and diastolic function.  Left ventricular ejection fraction, by visual estimation, is 60 to 65%.  Right Ventricle: Normal right ventricular size and function.  Left Atrium: The left atrium is normal in size.  Right Atrium: The right atrium is normal in size.  Pericardium: There is no evidence of pericardial effusion.  Mitral Valve: Structurally normal mitral valve, with normal leaflet excursion. Mild mitral valve regurgitation is seen.  Tricuspid Valve: Structurally normal tricuspid valve, with normal leaflet excursion. Mild tricuspid regurgitation is visualized.  Aortic Valve: Normal trileaflet aortic valve with normal opening. Peak transaortic gradient equals 17.3 mmHg, mean transaortic gradient equals 7.4 mmHg, the calculated aortic valve area equals 2.05 cm² by the continuity equation consistent with mild aortic stenosis. Trivial aortic valve regurgitation is seen.  Pulmonic Valve: Structurally normal pulmonic valve, with normal leaflet excursion. No indication of pulmonic valve regurgitation.  Aorta: The aortic root and ascending aorta are structurally normal, with no evidence of dilitation.  Pulmonary Artery: The main pulmonary artery is normal in size.      Summary:   1. Left ventricular ejection fraction, by visual estimation, is 60 to 65%.   2. Mild mitral valve regurgitation.   3. Mild tricuspid regurgitation.   4. Peak transaortic gradient equals 17.3 mmHg, mean transaortic gradient equals 7.4 mmHg, the calculated aortic valve area equals 2.05 cm² by the continuity equation consistent with mild aortic stenosis.    < end of copied text >  LABS:	 	    09 Oct 2020 06:43    137    |  103    |  33     ----------------------------<  123    4.7     |  31     |  0.97   08 Oct 2020 06:37    135    |  103    |  40     ----------------------------<  112    4.4     |  27     |  1.23   07 Oct 2020 07:41    136    |  104    |  36     ----------------------------<  127    4.7     |  26     |  1.24     Ca    8.6        09 Oct 2020 06:43                        8.9    9.08  )-----------( 163      ( 09 Oct 2020 06:43 )             28.0 ,                       9.3    9.50  )-----------( 141      ( 08 Oct 2020 06:37 )             28.0 ,                       10.6   9.59  )-----------( 147      ( 07 Oct 2020 07:41 )             31.7

## 2020-10-09 NOTE — PROGRESS NOTE ADULT - SUBJECTIVE AND OBJECTIVE BOX
Patient seen and examined at bedside, resting comfortably. Pain well controlled. Denies headache, lightheadedness, dizziness, chest pain, dyspnea, numbness/tingling. Had 1x episode of vomiting this am. Pt reports he has not eaten much since coming to the hospital. Offered anti-emetic medication, pt denied at this time. No other complaints at this time.       LABS:                        9.3    9.50  )-----------( 141      ( 08 Oct 2020 06:37 )             28.0     10-08    135  |  103  |  40<H>  ----------------------------<  112<H>  4.4   |  27  |  1.23    Ca    8.6      08 Oct 2020 06:37        Vitals  T(C): 36.8 (10-09-20 @ 00:08), Max: 36.8 (10-09-20 @ 00:08)  HR: 105 (10-09-20 @ 00:08) (88 - 112)  BP: 111/82 (10-09-20 @ 00:08) (96/62 - 127/79)  RR: 18 (10-09-20 @ 00:08) (17 - 18)  SpO2: 94% (10-09-20 @ 00:08) (90% - 98%)      PHYSICAL EXAM  General: NAD, Awake and Alert    RIGHT Lower Extremity:  Dressing c/d/i  SCDs in place  L2-S1 SILT  +TA/EHL/FHL/GSC  + DP pulses, WWP  Compartments soft and compressible  Calves nontender   Patient seen and examined at bedside, resting comfortably. Pain well controlled. Denies headache, lightheadedness, dizziness, chest pain, dyspnea, numbness/tingling. Had 1x episode of vomiting this am. Pt reports he has not eaten much since coming to the hospital. Offered anti-emetic medication, pt denied at this time. No other complaints at this time.       LABS:                        9.3    9.50  )-----------( 141      ( 08 Oct 2020 06:37 )             28.0     10-08    135  |  103  |  40<H>  ----------------------------<  112<H>  4.4   |  27  |  1.23    Ca    8.6      08 Oct 2020 06:37        Vitals  T(C): 36.8 (10-09-20 @ 00:08), Max: 36.8 (10-09-20 @ 00:08)  HR: 105 (10-09-20 @ 00:08) (88 - 112)  BP: 111/82 (10-09-20 @ 00:08) (96/62 - 127/79)  RR: 18 (10-09-20 @ 00:08) (17 - 18)  SpO2: 94% (10-09-20 @ 00:08) (90% - 98%)      PHYSICAL EXAM  General: NAD, Awake and Alert    RIGHT Lower Extremity:  Dressings c/d/i with mild saturation of middle dressing  Dressings removed, surgical sites intact with staples, no erythema or active drainage  New dressings placed  SCDs in place  L2-S1 SILT  +TA/EHL/FHL/GSC  + DP pulses, WWP  Compartments soft and compressible  Calves nontender

## 2020-10-09 NOTE — PROGRESS NOTE ADULT - SUBJECTIVE AND OBJECTIVE BOX
HPI:  85M otherwise healthy presents to the ED for right hip pain following a mechanical fall. Pt was sitting down in a chair when he missed the chair falling onto his right hip, he experienced immediate right hip pain and swelling. He denies hit to the head, LOC, any other orthopedic injuries. Pt denies SOB, CP, calf pain. (05 Oct 2020 16:02)    Patient is a 85y old  Male who presents with a chief complaint of right hip fx (06 Oct 2020 17:16)      INTERVAL HPI/OVERNIGHT EVENTS: no overnight events     MEDICATIONS  (STANDING):  acetaminophen  IVPB .. 1000 milliGRAM(s) IV Intermittent once  ascorbic acid 500 milliGRAM(s) Oral two times a day  folic acid 1 milliGRAM(s) Oral daily  multivitamin 1 Tablet(s) Oral daily  rivaroxaban 10 milliGRAM(s) Oral daily    MEDICATIONS  (PRN):  benzocaine 15 mG/menthol 3.6 mG (Sugar-Free) Lozenge 1 Lozenge Oral every 8 hours PRN Sore Throat  calcium carbonate    500 mG (Tums) Chewable 2 Tablet(s) Chew every 4 hours PRN Heartburn  guaiFENesin   Syrup  (Sugar-Free) 200 milliGRAM(s) Oral every 6 hours PRN Cough  HYDROmorphone  Injectable 0.5 milliGRAM(s) IV Push every 6 hours PRN Severe Pain (7 - 10)  melatonin 3 milliGRAM(s) Oral at bedtime PRN Sleep  ondansetron Injectable 4 milliGRAM(s) IV Push every 6 hours PRN Nausea and/or Vomiting  oxyCODONE    IR 10 milliGRAM(s) Oral every 4 hours PRN Moderate Pain (4 - 6)  oxyCODONE    IR 5 milliGRAM(s) Oral every 4 hours PRN Mild Pain (1 - 3)        Allergies    No Known Allergies    Intolerances        REVIEW OF SYSTEMS:  CONSTITUTIONAL: No fever, weight loss, or fatigue  EYES: No eye pain, visual disturbances, or discharge  ENMT:  No difficulty hearing, tinnitus, vertigo; No sinus or throat pain  NECK: No pain or stiffness  BREASTS: No pain, masses, or nipple discharge  RESPIRATORY: No cough, wheezing, chills or hemoptysis; No shortness of breath  CARDIOVASCULAR: No chest pain, palpitations, dizziness, or leg swelling  GASTROINTESTINAL: No abdominal or epigastric pain. No nausea, vomiting, or hematemesis; No diarrhea or constipation. No melena or hematochezia.  GENITOURINARY: No dysuria, frequency, hematuria, or incontinence  NEUROLOGICAL: No headaches, memory loss, loss of strength, numbness, or tremors  SKIN: No itching, burning, rashes, or lesions   LYMPH NODES: No enlarged glands  ENDOCRINE: No heat or cold intolerance; No hair loss  MUSCULOSKELETAL: No joint pain or swelling; No muscle, back, or extremity pain  PSYCHIATRIC: No depression, anxiety, mood swings, or difficulty sleeping  HEME/LYMPH: No easy bruising, or bleeding gums  ALLERGY AND IMMUNOLOGIC: No hives or eczema    Vital Signs Last 24 Hrs  T(C): 36.9 (09 Oct 2020 05:42), Max: 36.9 (09 Oct 2020 05:42)  T(F): 98.4 (09 Oct 2020 05:42), Max: 98.4 (09 Oct 2020 05:42)  HR: 98 (09 Oct 2020 05:42) (88 - 112)  BP: 115/61 (09 Oct 2020 05:42) (96/62 - 127/79)  BP(mean): --  RR: 18 (09 Oct 2020 05:42) (17 - 18)  SpO2: 96% (09 Oct 2020 05:42) (90% - 98%)      PHYSICAL EXAM:  GENERAL: NAD, well-groomed, well-developed  HEAD:  Atraumatic, Normocephalic  EYES: EOMI, PERRLA, conjunctiva and sclera clear  ENMT: No tonsillar erythema, exudates, or enlargement; Moist mucous membranes, Good dentition, No lesions  NECK: Supple, No JVD, Normal thyroid  NERVOUS SYSTEM:  Alert & Oriented X3, Good concentration; Motor Strength 5/5 B/L upper and lower extremities; DTRs 2+ intact and symmetric  CHEST/LUNG: Clear to percussion bilaterally; No rales, rhonchi, wheezing, or rubs  HEART: Regular rate and rhythm; No murmurs, rubs, or gallops  ABDOMEN: Soft, Nontender, Nondistended; Bowel sounds present  EXTREMITIES:  right leg wound dressed   LYMPH: No lymphadenopathy noted  SKIN: No rashes or lesions    LABS:                                                      8.9    9.08  )-----------( 163      ( 09 Oct 2020 06:43 )             28.0     10-    137  |  103  |  33<H>  ----------------------------<  123<H>  4.7   |  31  |  0.97    Ca    8.6      09 Oct 2020 06:43                    Color: Yellow / Appearance: Clear / S.025 / pH: x  Gluc: x / Ketone: Moderate  / Bili: Negative / Urobili: Negative mg/dL   Blood: x / Protein: 30 mg/dL / Nitrite: Positive   Leuk Esterase: Trace / RBC: 6-10 /HPF / WBC 3-5   Sq Epi: x / Non Sq Epi: Occasional / Bacteria: Few          CAPILLARY BLOOD GLUCOSE          RADIOLOGY & ADDITIONAL TESTS:  < from: TTE Echo Complete w/o Contrast w/ Doppler (10.06.20 @ 09:02) >  Summary:   1. Left ventricular ejection fraction, by visual estimation, is 60 to 65%.   2. Mild mitral valve regurgitation.   3. Mild tricuspid regurgitation.   4. Peak transaortic gradient equals 17.3 mmHg, mean transaortic gradient equals 7.4 mmHg, the calculated aortic valve area equals 2.05 cm² by the continuity equation consistent with mild aortic stenosis.    < end of copied text >    Imaging Personally Reviewed:  [X ] YES  [ ] NO    Consultant(s) Notes Reviewed:  [X ] YES  [ ] NO    Care Discussed with Consultants/Other Providers [ X] YES  [ ] NO

## 2020-10-10 PROCEDURE — 74018 RADEX ABDOMEN 1 VIEW: CPT | Mod: 26

## 2020-10-10 PROCEDURE — 99232 SBSQ HOSP IP/OBS MODERATE 35: CPT

## 2020-10-10 RX ORDER — RIVAROXABAN 15 MG-20MG
1 KIT ORAL
Qty: 60 | Refills: 0
Start: 2020-10-10 | End: 2020-12-08

## 2020-10-10 RX ORDER — RIVAROXABAN 15 MG-20MG
1 KIT ORAL
Qty: 0 | Refills: 0 | DISCHARGE
Start: 2020-10-10

## 2020-10-10 RX ORDER — ACETAMINOPHEN 500 MG
1000 TABLET ORAL ONCE
Refills: 0 | Status: COMPLETED | OUTPATIENT
Start: 2020-10-10 | End: 2020-10-11

## 2020-10-10 RX ADMIN — OXYCODONE HYDROCHLORIDE 10 MILLIGRAM(S): 5 TABLET ORAL at 17:46

## 2020-10-10 RX ADMIN — HYDROMORPHONE HYDROCHLORIDE 0.5 MILLIGRAM(S): 2 INJECTION INTRAMUSCULAR; INTRAVENOUS; SUBCUTANEOUS at 14:52

## 2020-10-10 RX ADMIN — ONDANSETRON 4 MILLIGRAM(S): 8 TABLET, FILM COATED ORAL at 13:03

## 2020-10-10 RX ADMIN — Medication 1 MILLIGRAM(S): at 12:58

## 2020-10-10 RX ADMIN — HYDROMORPHONE HYDROCHLORIDE 0.5 MILLIGRAM(S): 2 INJECTION INTRAMUSCULAR; INTRAVENOUS; SUBCUTANEOUS at 05:50

## 2020-10-10 RX ADMIN — Medication 1 TABLET(S): at 12:59

## 2020-10-10 RX ADMIN — OXYCODONE HYDROCHLORIDE 10 MILLIGRAM(S): 5 TABLET ORAL at 17:00

## 2020-10-10 RX ADMIN — Medication 1 ENEMA: at 13:03

## 2020-10-10 RX ADMIN — Medication 500 MILLIGRAM(S): at 17:04

## 2020-10-10 RX ADMIN — RIVAROXABAN 20 MILLIGRAM(S): KIT at 12:59

## 2020-10-10 RX ADMIN — HYDROMORPHONE HYDROCHLORIDE 0.5 MILLIGRAM(S): 2 INJECTION INTRAMUSCULAR; INTRAVENOUS; SUBCUTANEOUS at 04:28

## 2020-10-10 RX ADMIN — Medication 500 MILLIGRAM(S): at 05:50

## 2020-10-10 RX ADMIN — OXYCODONE HYDROCHLORIDE 5 MILLIGRAM(S): 5 TABLET ORAL at 06:20

## 2020-10-10 RX ADMIN — HYDROMORPHONE HYDROCHLORIDE 0.5 MILLIGRAM(S): 2 INJECTION INTRAMUSCULAR; INTRAVENOUS; SUBCUTANEOUS at 15:05

## 2020-10-10 RX ADMIN — OXYCODONE HYDROCHLORIDE 5 MILLIGRAM(S): 5 TABLET ORAL at 05:50

## 2020-10-10 NOTE — PROGRESS NOTE ADULT - SUBJECTIVE AND OBJECTIVE BOX
HPI:  85M otherwise healthy presents to the ED for right hip pain following a mechanical fall. Pt was sitting down in a chair when he missed the chair falling onto his right hip, he experienced immediate right hip pain and swelling. He denies hit to the head, LOC, any other orthopedic injuries. Pt denies SOB, CP, calf pain. (05 Oct 2020 16:02)    Patient is a 85y old  Male who presents with a chief complaint of right hip fx (06 Oct 2020 17:16)      INTERVAL HPI/OVERNIGHT EVENTS: no overnight events . no more vomiting or abd pain. no bm     MEDICATIONS  (STANDING):  acetaminophen  IVPB .. 1000 milliGRAM(s) IV Intermittent once  ascorbic acid 500 milliGRAM(s) Oral two times a day  folic acid 1 milliGRAM(s) Oral daily  multivitamin 1 Tablet(s) Oral daily  rivaroxaban 20 milliGRAM(s) Oral daily  saline laxative (FLEET) Rectal Enema 1 Enema Rectal once    MEDICATIONS  (PRN):  benzocaine 15 mG/menthol 3.6 mG (Sugar-Free) Lozenge 1 Lozenge Oral every 8 hours PRN Sore Throat  calcium carbonate    500 mG (Tums) Chewable 2 Tablet(s) Chew every 4 hours PRN Heartburn  guaiFENesin   Syrup  (Sugar-Free) 200 milliGRAM(s) Oral every 6 hours PRN Cough  HYDROmorphone  Injectable 0.5 milliGRAM(s) IV Push every 6 hours PRN Severe Pain (7 - 10)  melatonin 3 milliGRAM(s) Oral at bedtime PRN Sleep  ondansetron Injectable 4 milliGRAM(s) IV Push every 6 hours PRN Nausea and/or Vomiting  oxyCODONE    IR 10 milliGRAM(s) Oral every 4 hours PRN Moderate Pain (4 - 6)  oxyCODONE    IR 5 milliGRAM(s) Oral every 4 hours PRN Mild Pain (1 - 3)  3)        Allergies    No Known Allergies    Intolerances        REVIEW OF SYSTEMS:  CONSTITUTIONAL: No fever, weight loss, or fatigue  EYES: No eye pain, visual disturbances, or discharge  ENMT:  No difficulty hearing, tinnitus, vertigo; No sinus or throat pain  NECK: No pain or stiffness  BREASTS: No pain, masses, or nipple discharge  RESPIRATORY: No cough, wheezing, chills or hemoptysis; No shortness of breath  CARDIOVASCULAR: No chest pain, palpitations, dizziness, or leg swelling  GASTROINTESTINAL: No abdominal or epigastric pain. No nausea, vomiting, or hematemesis; No diarrhea or constipation. No melena or hematochezia.  GENITOURINARY: No dysuria, frequency, hematuria, or incontinence  NEUROLOGICAL: No headaches, memory loss, loss of strength, numbness, or tremors  SKIN: No itching, burning, rashes, or lesions   LYMPH NODES: No enlarged glands  ENDOCRINE: No heat or cold intolerance; No hair loss  MUSCULOSKELETAL: No joint pain or swelling; No muscle, back, or extremity pain  PSYCHIATRIC: No depression, anxiety, mood swings, or difficulty sleeping  HEME/LYMPH: No easy bruising, or bleeding gums  ALLERGY AND IMMUNOLOGIC: No hives or eczema    Vital Signs Last 24 Hrs  T(C): 36.4 (10 Oct 2020 11:19), Max: 37.1 (09 Oct 2020 23:47)  T(F): 97.6 (10 Oct 2020 11:19), Max: 98.8 (10 Oct 2020 05:00)  HR: 99 (10 Oct 2020 11:19) (64 - 99)  BP: 112/76 (10 Oct 2020 11:19) (93/61 - 129/55)  BP(mean): --  RR: 17 (10 Oct 2020 11:19) (17 - 18)  SpO2: 93% (10 Oct 2020 11:19) (93% - 98%)    PHYSICAL EXAM:  GENERAL: NAD, well-groomed, well-developed  HEAD:  Atraumatic, Normocephalic  EYES: EOMI, PERRLA, conjunctiva and sclera clear  ENMT: No tonsillar erythema, exudates, or enlargement; Moist mucous membranes, Good dentition, No lesions  NECK: Supple, No JVD, Normal thyroid  NERVOUS SYSTEM:  Alert & Oriented X3, Good concentration; Motor Strength 5/5 B/L upper and lower extremities; DTRs 2+ intact and symmetric  CHEST/LUNG: Clear to percussion bilaterally; No rales, rhonchi, wheezing, or rubs  HEART: Regular rate and rhythm; No murmurs, rubs, or gallops  ABDOMEN: Soft, Nontender, Nondistended; Bowel sounds present  EXTREMITIES:  right leg wound dressed   LYMPH: No lymphadenopathy noted  SKIN: No rashes or lesions    LABS:                                                             8.9    9.08  )-----------( 163      ( 09 Oct 2020 06:43 )             28.0     10-09    137  |  103  |  33<H>  ----------------------------<  123<H>  4.7   |  31  |  0.97    Ca    8.6      09 Oct 2020 06:43                        Color: Yellow / Appearance: Clear / S.025 / pH: x  Gluc: x / Ketone: Moderate  / Bili: Negative / Urobili: Negative mg/dL   Blood: x / Protein: 30 mg/dL / Nitrite: Positive   Leuk Esterase: Trace / RBC: 6-10 /HPF / WBC 3-5   Sq Epi: x / Non Sq Epi: Occasional / Bacteria: Few          CAPILLARY BLOOD GLUCOSE          RADIOLOGY & ADDITIONAL TESTS:  < from: TTE Echo Complete w/o Contrast w/ Doppler (10.06.20 @ 09:02) >  Summary:   1. Left ventricular ejection fraction, by visual estimation, is 60 to 65%.   2. Mild mitral valve regurgitation.   3. Mild tricuspid regurgitation.   4. Peak transaortic gradient equals 17.3 mmHg, mean transaortic gradient equals 7.4 mmHg, the calculated aortic valve area equals 2.05 cm² by the continuity equation consistent with mild aortic stenosis.    < end of copied text >    Imaging Personally Reviewed:  [X ] YES  [ ] NO    Consultant(s) Notes Reviewed:  [X ] YES  [ ] NO    Care Discussed with Consultants/Other Providers [ X] YES  [ ] NO

## 2020-10-10 NOTE — PROGRESS NOTE ADULT - ASSESSMENT
84 y/o male with acute hip fracture and Afib. Patient evaluated by cardiology and deemed to have acceptable  cardiac risk for proscedure. POD 2    -Routine post surgical ortho care. pt eating and drinking well. pain controlled     UTI   UA ++ LE and nitrite but minimal wbc and epithelial cells. ucx neg. remains afebrile   dc abx   afebrile    acute blood loss anemia. 2/2 surgery   - slowly trending down   - trend cbc. no bloody stool      Afibb   -Echo reviewed  - Pt  agreed to AC so would recommend a NOAC. He reports only falling this one time. understands risk of falling on AC   - c/w  xarelto 20 mg daily and trend cbc     vomitng  - likely 2.2 to constipation but is now eating well and sx free.  hasnt had a bm in 1 week but abdomen is soft nt. given mag citrate yesterday. will give an enema now and reeval    dispo to [facility]      84 y/o male with acute hip fracture and Afib. Patient evaluated by cardiology and deemed to have acceptable  cardiac risk for proscedure. POD 2    -Routine post surgical ortho care. pt eating and drinking well. pain controlled     UTI   UA ++ LE and nitrite but minimal wbc and epithelial cells. ucx neg. remains afebrile   dc abx   afebrile    acute blood loss anemia. 2/2 surgery   - slowly trending down   - trend cbc. no bloody stool      Afibb   -Echo reviewed  - Pt  agreed to AC so would recommend a NOAC. He reports only falling this one time. understands risk of falling on AC   - c/w  xarelto 20 mg daily and trend cbc     vomitng   - likely 2.2 to constipation and ileus. Had large bm today.  abdomen is soft nt. given mag citrate yesterday. will repeat kub and place on clears     dispo to [facility]

## 2020-10-10 NOTE — PROGRESS NOTE ADULT - SUBJECTIVE AND OBJECTIVE BOX
Patient seen and examined at bedside, resting comfortably. Pain well controlled. Denies headache, lightheadedness, dizziness, chest pain, dyspnea, numbness/tingling. Vomiting has resolved. Pt reports he feels strong today. No other complaints at this time.     Vital Signs Last 24 Hrs  T(C): 37.1 (10 Oct 2020 05:00), Max: 37.1 (09 Oct 2020 23:47)  T(F): 98.8 (10 Oct 2020 05:00), Max: 98.8 (10 Oct 2020 05:00)  HR: 76 (10 Oct 2020 05:00) (64 - 99)  BP: 108/71 (10 Oct 2020 05:00) (93/61 - 129/55)  BP(mean): --  RR: 17 (10 Oct 2020 05:00) (17 - 18)  SpO2: 96% (10 Oct 2020 05:00) (95% - 98%)      LABS:  cret                        8.9    9.08  )-----------( 163      ( 09 Oct 2020 06:43 )             28.0     10-09    137  |  103  |  33<H>  ----------------------------<  123<H>  4.7   |  31  |  0.97    Ca    8.6      09 Oct 2020 06:43          PHYSICAL EXAM  General: NAD, Awake and Alert    RIGHT Lower Extremity:  Dressings c/d/i with mild saturation of middle dressing  Dressings removed, surgical sites intact with staples, no erythema or active drainage  New dressings placed  SCDs in place  L2-S1 SILT  +TA/EHL/FHL/GSC  + DP pulses, WWP  Compartments soft and compressible  Calves nontender

## 2020-10-10 NOTE — PROGRESS NOTE ADULT - ASSESSMENT
Assessment:  85y Male s/p RIGHT hip IMN POD #4    -Dressings changed today   -Pain control  -DVT ppx: Xarelto 20mg  -WBAT/OOB with assistance as needed  -PT/OT  -Ice and elevate  -Encourage incentive spirometry  -DC planning: GREY today pending good oral intake and strength  -ortho stable for D/c  -Will discuss with attending and will advise if plan changes.

## 2020-10-11 LAB
ANION GAP SERPL CALC-SCNC: 7 MMOL/L — SIGNIFICANT CHANGE UP (ref 5–17)
BUN SERPL-MCNC: 35 MG/DL — HIGH (ref 7–23)
CALCIUM SERPL-MCNC: 8.9 MG/DL — SIGNIFICANT CHANGE UP (ref 8.5–10.1)
CHLORIDE SERPL-SCNC: 100 MMOL/L — SIGNIFICANT CHANGE UP (ref 96–108)
CO2 SERPL-SCNC: 32 MMOL/L — HIGH (ref 22–31)
CREAT SERPL-MCNC: 1.11 MG/DL — SIGNIFICANT CHANGE UP (ref 0.5–1.3)
GLUCOSE SERPL-MCNC: 152 MG/DL — HIGH (ref 70–99)
HCT VFR BLD CALC: 30.3 % — LOW (ref 39–50)
HGB BLD-MCNC: 10 G/DL — LOW (ref 13–17)
MAGNESIUM SERPL-MCNC: 3 MG/DL — HIGH (ref 1.6–2.6)
MCHC RBC-ENTMCNC: 32.3 PG — SIGNIFICANT CHANGE UP (ref 27–34)
MCHC RBC-ENTMCNC: 33 GM/DL — SIGNIFICANT CHANGE UP (ref 32–36)
MCV RBC AUTO: 97.7 FL — SIGNIFICANT CHANGE UP (ref 80–100)
NRBC # BLD: 0 /100 WBCS — SIGNIFICANT CHANGE UP (ref 0–0)
PHOSPHATE SERPL-MCNC: 3.2 MG/DL — SIGNIFICANT CHANGE UP (ref 2.5–4.5)
PLATELET # BLD AUTO: 256 K/UL — SIGNIFICANT CHANGE UP (ref 150–400)
POTASSIUM SERPL-MCNC: 4.4 MMOL/L — SIGNIFICANT CHANGE UP (ref 3.5–5.3)
POTASSIUM SERPL-SCNC: 4.4 MMOL/L — SIGNIFICANT CHANGE UP (ref 3.5–5.3)
RBC # BLD: 3.1 M/UL — LOW (ref 4.2–5.8)
RBC # FLD: 14.6 % — HIGH (ref 10.3–14.5)
SODIUM SERPL-SCNC: 139 MMOL/L — SIGNIFICANT CHANGE UP (ref 135–145)
WBC # BLD: 10.34 K/UL — SIGNIFICANT CHANGE UP (ref 3.8–10.5)
WBC # FLD AUTO: 10.34 K/UL — SIGNIFICANT CHANGE UP (ref 3.8–10.5)

## 2020-10-11 PROCEDURE — 99233 SBSQ HOSP IP/OBS HIGH 50: CPT

## 2020-10-11 PROCEDURE — 74018 RADEX ABDOMEN 1 VIEW: CPT | Mod: 26

## 2020-10-11 PROCEDURE — 99232 SBSQ HOSP IP/OBS MODERATE 35: CPT

## 2020-10-11 RX ORDER — METOCLOPRAMIDE HCL 10 MG
5 TABLET ORAL EVERY 8 HOURS
Refills: 0 | Status: COMPLETED | OUTPATIENT
Start: 2020-10-11 | End: 2020-10-12

## 2020-10-11 RX ORDER — SODIUM CHLORIDE 9 MG/ML
1000 INJECTION, SOLUTION INTRAVENOUS
Refills: 0 | Status: DISCONTINUED | OUTPATIENT
Start: 2020-10-11 | End: 2020-10-12

## 2020-10-11 RX ORDER — ACETAMINOPHEN 500 MG
1000 TABLET ORAL ONCE
Refills: 0 | Status: COMPLETED | OUTPATIENT
Start: 2020-10-11 | End: 2020-10-11

## 2020-10-11 RX ORDER — OXYCODONE HYDROCHLORIDE 5 MG/1
2.5 TABLET ORAL EVERY 4 HOURS
Refills: 0 | Status: DISCONTINUED | OUTPATIENT
Start: 2020-10-11 | End: 2020-10-11

## 2020-10-11 RX ORDER — ACETAMINOPHEN 500 MG
975 TABLET ORAL EVERY 8 HOURS
Refills: 0 | Status: DISCONTINUED | OUTPATIENT
Start: 2020-10-11 | End: 2020-10-23

## 2020-10-11 RX ADMIN — RIVAROXABAN 20 MILLIGRAM(S): KIT at 12:18

## 2020-10-11 RX ADMIN — ONDANSETRON 4 MILLIGRAM(S): 8 TABLET, FILM COATED ORAL at 04:35

## 2020-10-11 RX ADMIN — Medication 400 MILLIGRAM(S): at 20:35

## 2020-10-11 RX ADMIN — Medication 400 MILLIGRAM(S): at 04:41

## 2020-10-11 RX ADMIN — Medication 500 MILLIGRAM(S): at 17:29

## 2020-10-11 RX ADMIN — Medication 5 MILLIGRAM(S): at 21:32

## 2020-10-11 RX ADMIN — Medication 5 MILLIGRAM(S): at 15:03

## 2020-10-11 RX ADMIN — Medication 500 MILLIGRAM(S): at 05:09

## 2020-10-11 RX ADMIN — Medication 1000 MILLIGRAM(S): at 20:50

## 2020-10-11 RX ADMIN — SODIUM CHLORIDE 75 MILLILITER(S): 9 INJECTION, SOLUTION INTRAVENOUS at 12:20

## 2020-10-11 RX ADMIN — Medication 1000 MILLIGRAM(S): at 05:11

## 2020-10-11 RX ADMIN — Medication 1 MILLIGRAM(S): at 12:18

## 2020-10-11 RX ADMIN — Medication 1 TABLET(S): at 12:18

## 2020-10-11 NOTE — PROGRESS NOTE ADULT - ASSESSMENT
84 y/o male with acute hip fracture and Afib. Patient evaluated by cardiology and deemed to have acceptable  cardiac risk for proscedure. POD 2    -Routine post surgical ortho care. pt eating and drinking well. pain controlled     UTI   UA ++ LE and nitrite but minimal wbc and epithelial cells. ucx neg. remains afebrile   dc abx   afebrile    acute blood loss anemia. 2/2 surgery   - cbc stable   - trend cbc. no bloody stool      Afibb   -Echo reviewed  - Pt  agreed to AC so would recommend a NOAC. He reports only falling this one time. understands risk of falling on AC   - c/w  xarelto 20 mg daily and trend cbc     vomitng   -2/2 post op ileus. Had large bm yesterday and passing gas but still vomitng.  abdomen is soft nt. ++BS.  will make npo and decrease narcotics give a few doses of reglan     dispo to kaitlyn

## 2020-10-11 NOTE — PROGRESS NOTE ADULT - SUBJECTIVE AND OBJECTIVE BOX
Patient seen and examined at bedside, resting comfortably. Pain well controlled. Denies headache, lightheadedness, dizziness, chest pain, dyspnea, numbness/tingling. Pt received an enema and had a bowel movement and is passing flatus. However it is noted by patient and the nurse that patient will vomit liquids whenever he tries to put them down. He denies any abdominal pain. KUB xr ysterday revealed post op ileus      Vital Signs Last 24 Hrs  T(C): 37 (11 Oct 2020 05:00), Max: 37.2 (10 Oct 2020 23:15)  T(F): 98.6 (11 Oct 2020 05:00), Max: 98.9 (10 Oct 2020 23:15)  HR: 104 (11 Oct 2020 05:00) (60 - 104)  BP: 99/67 (11 Oct 2020 05:00) (99/67 - 112/76)  BP(mean): --  RR: 17 (11 Oct 2020 05:00) (17 - 18)  SpO2: 96% (11 Oct 2020 05:00) (93% - 96%)      PHYSICAL EXAM  General: NAD, Awake and Alert    RIGHT Lower Extremity:  Dressings c/d/  SCDs in place  L2-S1 SILT  +TA/EHL/FHL/GSC  + DP pulses, WWP  Compartments soft and compressible  Calves nontender    abdomen soft, nontender, nondistended

## 2020-10-11 NOTE — PROGRESS NOTE ADULT - ASSESSMENT
Assessment:  85y Male s/p RIGHT hip IMN POD #5    -Pain control  -DVT ppx: Xarelto 20mg  -WBAT/OOB with assistance as needed  -PT/OT  -Ice and elevate  -Encourage incentive spirometry  -Will order KUB to re evaluate abdomen  -DC planning: GREY today pending good oral intake and strength  -ortho stable for D/c  -Will discuss with attending and will advise if plan changes.

## 2020-10-11 NOTE — PROGRESS NOTE ADULT - SUBJECTIVE AND OBJECTIVE BOX
HPI:  85M otherwise healthy presents to the ED for right hip pain following a mechanical fall. Pt was sitting down in a chair when he missed the chair falling onto his right hip, he experienced immediate right hip pain and swelling. He denies hit to the head, LOC, any other orthopedic injuries. Pt denies SOB, CP, calf pain. (05 Oct 2020 16:02)    Patient is a 85y old  Male who presents with a chief complaint of right hip fx (06 Oct 2020 17:16)      INTERVAL HPI/OVERNIGHT EVENTS: vomited clears last night     MEDICATIONS  (STANDING):  acetaminophen  IVPB .. 1000 milliGRAM(s) IV Intermittent once  ascorbic acid 500 milliGRAM(s) Oral two times a day  dextrose 5% + lactated ringers. 1000 milliLiter(s) (75 mL/Hr) IV Continuous <Continuous>  folic acid 1 milliGRAM(s) Oral daily  metoclopramide Injectable 5 milliGRAM(s) IV Push every 8 hours  multivitamin 1 Tablet(s) Oral daily  rivaroxaban 20 milliGRAM(s) Oral daily    MEDICATIONS  (PRN):  acetaminophen   Tablet .. 975 milliGRAM(s) Oral every 8 hours PRN Moderate Pain (4 - 6)  benzocaine 15 mG/menthol 3.6 mG (Sugar-Free) Lozenge 1 Lozenge Oral every 8 hours PRN Sore Throat  calcium carbonate    500 mG (Tums) Chewable 2 Tablet(s) Chew every 4 hours PRN Heartburn  guaiFENesin   Syrup  (Sugar-Free) 200 milliGRAM(s) Oral every 6 hours PRN Cough  melatonin 3 milliGRAM(s) Oral at bedtime PRN Sleep  ondansetron Injectable 4 milliGRAM(s) IV Push every 6 hours PRN Nausea and/or Vomiting  oxyCODONE    IR 2.5 milliGRAM(s) Oral every 4 hours PRN Severe Pain (7 - 10)    3)        Allergies    No Known Allergies    Intolerances        REVIEW OF SYSTEMS:  CONSTITUTIONAL: No fever, weight loss, or fatigue  EYES: No eye pain, visual disturbances, or discharge  ENMT:  No difficulty hearing, tinnitus, vertigo; No sinus or throat pain  NECK: No pain or stiffness  BREASTS: No pain, masses, or nipple discharge  RESPIRATORY: No cough, wheezing, chills or hemoptysis; No shortness of breath  CARDIOVASCULAR: No chest pain, palpitations, dizziness, or leg swelling  GASTROINTESTINAL: No abdominal or epigastric pain. No nausea, vomiting, or hematemesis; No diarrhea or constipation. No melena or hematochezia.  GENITOURINARY: No dysuria, frequency, hematuria, or incontinence  NEUROLOGICAL: No headaches, memory loss, loss of strength, numbness, or tremors  SKIN: No itching, burning, rashes, or lesions   LYMPH NODES: No enlarged glands  ENDOCRINE: No heat or cold intolerance; No hair loss  MUSCULOSKELETAL: No joint pain or swelling; No muscle, back, or extremity pain  PSYCHIATRIC: No depression, anxiety, mood swings, or difficulty sleeping  HEME/LYMPH: No easy bruising, or bleeding gums  ALLERGY AND IMMUNOLOGIC: No hives or eczema    Vital Signs Last 24 Hrs  T(C): 36.6 (11 Oct 2020 10:42), Max: 37.2 (10 Oct 2020 23:15)  T(F): 97.8 (11 Oct 2020 10:42), Max: 98.9 (10 Oct 2020 23:15)  HR: 88 (11 Oct 2020 10:42) (60 - 104)  BP: 105/63 (11 Oct 2020 10:42) (99/67 - 112/75)  BP(mean): --  RR: 17 (11 Oct 2020 10:42) (17 - 18)  SpO2: 96% (11 Oct 2020 10:42) (95% - 96%)    PHYSICAL EXAM:  GENERAL: NAD, well-groomed, well-developed  HEAD:  Atraumatic, Normocephalic  EYES: EOMI, PERRLA, conjunctiva and sclera clear  ENMT: No tonsillar erythema, exudates, or enlargement; Moist mucous membranes, Good dentition, No lesions  NECK: Supple, No JVD, Normal thyroid  NERVOUS SYSTEM:  Alert & Oriented X3, Good concentration; Motor Strength 5/5 B/L upper and lower extremities; DTRs 2+ intact and symmetric  CHEST/LUNG: Clear to percussion bilaterally; No rales, rhonchi, wheezing, or rubs  HEART: Regular rate and rhythm; No murmurs, rubs, or gallops  ABDOMEN: Soft, Nontender, Nondistended; Bowel sounds present  EXTREMITIES:  right leg wound dressed   LYMPH: No lymphadenopathy noted  SKIN: No rashes or lesions    LABS:                                \                      10.0   10.34 )-----------( 256      ( 11 Oct 2020 09:06 )             30.3     10-11    139  |  100  |  35<H>  ----------------------------<  152<H>  4.4   |  32<H>  |  1.11    Ca    8.9      11 Oct 2020 09:06  Phos  3.2     10-11  Mg     3.0     10-11                                Color: Yellow / Appearance: Clear / S.025 / pH: x  Gluc: x / Ketone: Moderate  / Bili: Negative / Urobili: Negative mg/dL   Blood: x / Protein: 30 mg/dL / Nitrite: Positive   Leuk Esterase: Trace / RBC: 6-10 /HPF / WBC 3-5   Sq Epi: x / Non Sq Epi: Occasional / Bacteria: Few          CAPILLARY BLOOD GLUCOSE          RADIOLOGY & ADDITIONAL TESTS:  < from: TTE Echo Complete w/o Contrast w/ Doppler (10.06.20 @ 09:02) >  Summary:   1. Left ventricular ejection fraction, by visual estimation, is 60 to 65%.   2. Mild mitral valve regurgitation.   3. Mild tricuspid regurgitation.   4. Peak transaortic gradient equals 17.3 mmHg, mean transaortic gradient equals 7.4 mmHg, the calculated aortic valve area equals 2.05 cm² by the continuity equation consistent with mild aortic stenosis.    < end of copied text >    Imaging Personally Reviewed:  [X ] YES  [ ] NO    Consultant(s) Notes Reviewed:  [X ] YES  [ ] NO    Care Discussed with Consultants/Other Providers [ X] YES  [ ] NO

## 2020-10-12 LAB
ANION GAP SERPL CALC-SCNC: 6 MMOL/L — SIGNIFICANT CHANGE UP (ref 5–17)
BUN SERPL-MCNC: 37 MG/DL — HIGH (ref 7–23)
CALCIUM SERPL-MCNC: 8.7 MG/DL — SIGNIFICANT CHANGE UP (ref 8.5–10.1)
CHLORIDE SERPL-SCNC: 97 MMOL/L — SIGNIFICANT CHANGE UP (ref 96–108)
CO2 SERPL-SCNC: 33 MMOL/L — HIGH (ref 22–31)
CREAT SERPL-MCNC: 1.01 MG/DL — SIGNIFICANT CHANGE UP (ref 0.5–1.3)
GLUCOSE SERPL-MCNC: 131 MG/DL — HIGH (ref 70–99)
HCT VFR BLD CALC: 29.5 % — LOW (ref 39–50)
HGB BLD-MCNC: 9.8 G/DL — LOW (ref 13–17)
MCHC RBC-ENTMCNC: 32.2 PG — SIGNIFICANT CHANGE UP (ref 27–34)
MCHC RBC-ENTMCNC: 33.2 GM/DL — SIGNIFICANT CHANGE UP (ref 32–36)
MCV RBC AUTO: 97 FL — SIGNIFICANT CHANGE UP (ref 80–100)
NRBC # BLD: 0 /100 WBCS — SIGNIFICANT CHANGE UP (ref 0–0)
PLATELET # BLD AUTO: 211 K/UL — SIGNIFICANT CHANGE UP (ref 150–400)
POTASSIUM SERPL-MCNC: 4.2 MMOL/L — SIGNIFICANT CHANGE UP (ref 3.5–5.3)
POTASSIUM SERPL-SCNC: 4.2 MMOL/L — SIGNIFICANT CHANGE UP (ref 3.5–5.3)
RBC # BLD: 3.04 M/UL — LOW (ref 4.2–5.8)
RBC # FLD: 14.8 % — HIGH (ref 10.3–14.5)
SARS-COV-2 RNA SPEC QL NAA+PROBE: SIGNIFICANT CHANGE UP
SODIUM SERPL-SCNC: 136 MMOL/L — SIGNIFICANT CHANGE UP (ref 135–145)
WBC # BLD: 13.31 K/UL — HIGH (ref 3.8–10.5)
WBC # FLD AUTO: 13.31 K/UL — HIGH (ref 3.8–10.5)

## 2020-10-12 PROCEDURE — 99233 SBSQ HOSP IP/OBS HIGH 50: CPT

## 2020-10-12 PROCEDURE — 74177 CT ABD & PELVIS W/CONTRAST: CPT | Mod: 26

## 2020-10-12 RX ORDER — ACETAMINOPHEN 500 MG
1000 TABLET ORAL ONCE
Refills: 0 | Status: COMPLETED | OUTPATIENT
Start: 2020-10-12 | End: 2020-10-12

## 2020-10-12 RX ORDER — SODIUM CHLORIDE 9 MG/ML
1000 INJECTION, SOLUTION INTRAVENOUS
Refills: 0 | Status: DISCONTINUED | OUTPATIENT
Start: 2020-10-13 | End: 2020-10-16

## 2020-10-12 RX ORDER — ENOXAPARIN SODIUM 100 MG/ML
100 INJECTION SUBCUTANEOUS
Refills: 0 | Status: DISCONTINUED | OUTPATIENT
Start: 2020-10-12 | End: 2020-10-18

## 2020-10-12 RX ORDER — TRAMADOL HYDROCHLORIDE 50 MG/1
50 TABLET ORAL EVERY 6 HOURS
Refills: 0 | Status: DISCONTINUED | OUTPATIENT
Start: 2020-10-12 | End: 2020-10-14

## 2020-10-12 RX ORDER — SODIUM CHLORIDE 9 MG/ML
1000 INJECTION, SOLUTION INTRAVENOUS
Refills: 0 | Status: DISCONTINUED | OUTPATIENT
Start: 2020-10-12 | End: 2020-10-15

## 2020-10-12 RX ORDER — IOHEXOL 300 MG/ML
30 INJECTION, SOLUTION INTRAVENOUS ONCE
Refills: 0 | Status: COMPLETED | OUTPATIENT
Start: 2020-10-12 | End: 2020-10-12

## 2020-10-12 RX ORDER — TRAMADOL HYDROCHLORIDE 50 MG/1
25 TABLET ORAL EVERY 6 HOURS
Refills: 0 | Status: DISCONTINUED | OUTPATIENT
Start: 2020-10-12 | End: 2020-10-12

## 2020-10-12 RX ADMIN — SODIUM CHLORIDE 125 MILLILITER(S): 9 INJECTION, SOLUTION INTRAVENOUS at 18:55

## 2020-10-12 RX ADMIN — Medication 500 MILLIGRAM(S): at 18:31

## 2020-10-12 RX ADMIN — Medication 1 MILLIGRAM(S): at 13:29

## 2020-10-12 RX ADMIN — Medication 1000 MILLIGRAM(S): at 04:30

## 2020-10-12 RX ADMIN — TRAMADOL HYDROCHLORIDE 50 MILLIGRAM(S): 50 TABLET ORAL at 14:29

## 2020-10-12 RX ADMIN — Medication 5 MILLIGRAM(S): at 05:08

## 2020-10-12 RX ADMIN — ENOXAPARIN SODIUM 100 MILLIGRAM(S): 100 INJECTION SUBCUTANEOUS at 18:31

## 2020-10-12 RX ADMIN — TRAMADOL HYDROCHLORIDE 25 MILLIGRAM(S): 50 TABLET ORAL at 19:00

## 2020-10-12 RX ADMIN — Medication 1 TABLET(S): at 13:28

## 2020-10-12 RX ADMIN — TRAMADOL HYDROCHLORIDE 25 MILLIGRAM(S): 50 TABLET ORAL at 20:00

## 2020-10-12 RX ADMIN — Medication 975 MILLIGRAM(S): at 02:58

## 2020-10-12 RX ADMIN — TRAMADOL HYDROCHLORIDE 50 MILLIGRAM(S): 50 TABLET ORAL at 13:29

## 2020-10-12 RX ADMIN — IOHEXOL 30 MILLILITER(S): 300 INJECTION, SOLUTION INTRAVENOUS at 06:48

## 2020-10-12 RX ADMIN — SODIUM CHLORIDE 75 MILLILITER(S): 9 INJECTION, SOLUTION INTRAVENOUS at 07:36

## 2020-10-12 RX ADMIN — ONDANSETRON 4 MILLIGRAM(S): 8 TABLET, FILM COATED ORAL at 02:11

## 2020-10-12 RX ADMIN — Medication 400 MILLIGRAM(S): at 04:02

## 2020-10-12 RX ADMIN — SODIUM CHLORIDE 75 MILLILITER(S): 9 INJECTION, SOLUTION INTRAVENOUS at 02:10

## 2020-10-12 NOTE — PROGRESS NOTE ADULT - SUBJECTIVE AND OBJECTIVE BOX
Patient seen and examined at bedside, resting comfortably. Pain well controlled. Denies headache, lightheadedness, dizziness, chest pain, dyspnea, numbness/tingling. Pt is passing gas but has only had one BM since his enema. Pt and nurse report episodes of vomiting overnight. He denies any abdominal pain. KUB XR yesterday revealed gastric outlet obstruction      LABS:                        10.0   10.34 )-----------( 256      ( 11 Oct 2020 09:06 )             30.3     10-11    139  |  100  |  35<H>  ----------------------------<  152<H>  4.4   |  32<H>  |  1.11    Ca    8.9      11 Oct 2020 09:06  Phos  3.2     10-11  Mg     3.0     10-11        Vital Signs Last 24 Hrs  T(C): 37.2 (11 Oct 2020 23:21), Max: 37.2 (11 Oct 2020 23:21)  T(F): 98.9 (11 Oct 2020 23:21), Max: 98.9 (11 Oct 2020 23:21)  HR: 103 (11 Oct 2020 23:21) (88 - 105)  BP: 111/65 (11 Oct 2020 23:21) (100/81 - 132/79)  BP(mean): --  RR: 17 (11 Oct 2020 23:21) (17 - 18)  SpO2: 96% (11 Oct 2020 23:21) (95% - 98%)        PHYSICAL EXAM  General: NAD, Awake and Alert    RIGHT Lower Extremity:  Proximal dressings saturated, removed, surgical sites intact with staples, new dressings placed  SCDs in place  L2-S1 SILT  +TA/EHL/FHL/GSC  + DP pulses, WWP  Compartments soft and compressible  Calves nontender    Abdomen soft, nontender, nondistended

## 2020-10-12 NOTE — CHART NOTE - NSCHARTNOTEFT_GEN_A_CORE
Discussed with internal medical attending Dr. Cisse that patient is orthopedically stable now for discharge. Pt currently still needs active medical treatment for post operative ileus requiring NGT at the moment. Dr. Cisse will take patient onto his service for further medical treatment. Patient is orthopedically stable for discharge and can follow up with Dr. Richardson in the office in post op day 14 for staple removal and xrays. If patient currently still hospitalized at that time orthopedics will remove staples and order xrays. No further orthopedic intervention at this time    WBAT RLE  Analgesia as needed  Continued DVT prophylaxis for 30 days   PT/OT  Staples to be removed POD 14   Ortho stable for DC, Outpatient follow up  Discussed with Dr. Richardson who agrees with above

## 2020-10-12 NOTE — PROGRESS NOTE ADULT - SUBJECTIVE AND OBJECTIVE BOX
HPI:  85M otherwise healthy presents to the ED for right hip pain following a mechanical fall. Pt was sitting down in a chair when he missed the chair falling onto his right hip, he experienced immediate right hip pain and swelling. He denies hit to the head, LOC, any other orthopedic injuries. Pt denies SOB, CP, calf pain. (05 Oct 2020 16:02)    Patient is a 85y old  Male who presents with a chief complaint of right hip fx (06 Oct 2020 17:16)      INTERVAL HPI/OVERNIGHT EVENTS: vomited last night while NPO and NG tube was placed and had 3L of output     MEDICATIONS  (STANDING):  ascorbic acid 500 milliGRAM(s) Oral two times a day  dextrose 5% + lactated ringers. 1000 milliLiter(s) (75 mL/Hr) IV Continuous <Continuous>  folic acid 1 milliGRAM(s) Oral daily  multivitamin 1 Tablet(s) Oral daily  rivaroxaban 20 milliGRAM(s) Oral daily    MEDICATIONS  (PRN):  acetaminophen   Tablet .. 975 milliGRAM(s) Oral every 8 hours PRN Moderate Pain (4 - 6)  benzocaine 15 mG/menthol 3.6 mG (Sugar-Free) Lozenge 1 Lozenge Oral every 8 hours PRN Sore Throat  calcium carbonate    500 mG (Tums) Chewable 2 Tablet(s) Chew every 4 hours PRN Heartburn  guaiFENesin   Syrup  (Sugar-Free) 200 milliGRAM(s) Oral every 6 hours PRN Cough  melatonin 3 milliGRAM(s) Oral at bedtime PRN Sleep  ondansetron Injectable 4 milliGRAM(s) IV Push every 6 hours PRN Nausea and/or Vomiting  traMADol 25 milliGRAM(s) Oral every 6 hours PRN Moderate Pain (4 - 6)  traMADol 50 milliGRAM(s) Oral every 6 hours PRN Severe Pain (7 - 10)            Allergies    No Known Allergies    Intolerances        REVIEW OF SYSTEMS:  CONSTITUTIONAL: No fever, weight loss, or fatigue  EYES: No eye pain, visual disturbances, or discharge  ENMT:  No difficulty hearing, tinnitus, vertigo; No sinus or throat pain  NECK: No pain or stiffness  BREASTS: No pain, masses, or nipple discharge  RESPIRATORY: No cough, wheezing, chills or hemoptysis; No shortness of breath  CARDIOVASCULAR: No chest pain, palpitations, dizziness, or leg swelling  GASTROINTESTINAL: No abdominal or epigastric pain. No nausea, vomiting, or hematemesis; No diarrhea or constipation. No melena or hematochezia.  GENITOURINARY: No dysuria, frequency, hematuria, or incontinence  NEUROLOGICAL: No headaches, memory loss, loss of strength, numbness, or tremors  SKIN: No itching, burning, rashes, or lesions   LYMPH NODES: No enlarged glands  ENDOCRINE: No heat or cold intolerance; No hair loss  MUSCULOSKELETAL: No joint pain or swelling; No muscle, back, or extremity pain  PSYCHIATRIC: No depression, anxiety, mood swings, or difficulty sleeping  HEME/LYMPH: No easy bruising, or bleeding gums  ALLERGY AND IMMUNOLOGIC: No hives or eczema    Vital Signs Last 24 Hrs  T(C): 36.9 (12 Oct 2020 11:29), Max: 37.2 (11 Oct 2020 23:21)  T(F): 98.4 (12 Oct 2020 11:29), Max: 98.9 (11 Oct 2020 23:21)  HR: 93 (12 Oct 2020 11:29) (90 - 105)  BP: 104/74 (12 Oct 2020 11:29) (100/81 - 132/79)  BP(mean): --  RR: 16 (12 Oct 2020 11:29) (16 - 18)  SpO2: 100% (12 Oct 2020 11:29) (94% - 100%)      PHYSICAL EXAM:  GENERAL: NAD, well-groomed, well-developed  HEAD:  Atraumatic, Normocephalic  EYES: EOMI, PERRLA, conjunctiva and sclera clear  ENMT: No tonsillar erythema, exudates, or enlargement; Moist mucous membranes, Good dentition, No lesions  NECK: Supple, No JVD, Normal thyroid  NERVOUS SYSTEM:  Alert & Oriented X3, Good concentration; Motor Strength 5/5 B/L upper and lower extremities; DTRs 2+ intact and symmetric  CHEST/LUNG: Clear to percussion bilaterally; No rales, rhonchi, wheezing, or rubs  HEART: Regular rate and rhythm; No murmurs, rubs, or gallops  ABDOMEN: Soft, Nontender, Nondistended; Bowel sounds present  EXTREMITIES:  right leg wound dressed   LYMPH: No lymphadenopathy noted  SKIN: No rashes or lesions    LABS:                                         9.8    13.31 )-----------( 211      ( 12 Oct 2020 06:37 )             29.5     10-12    136  |  97  |  37<H>  ----------------------------<  131<H>  4.2   |  33<H>  |  1.01    Ca    8.7      12 Oct 2020 06:37  Phos  3.2     10-11  Mg     3.0     10-11              ct< from: CT Abdomen and Pelvis w/ Oral Cont and w/ IV Cont (10.12.20 @ 07:12) >  IMPRESSION:    1. Lower lobe opacities, which may represent atelectasis and/or pneumonia.  2. Gastric distention. Enteric tube in place.  3. Left upper pole renal mass (5 x 4.5 cm), suspicious for renal malignancy.  4. Urinary bladder intraluminal nodule (1.7 cm). Recommend direct visualization with cystoscopy for further assessment.  5. Colonic diverticulosis without CT evidence of acute diverticulitis.  6. Right hip postsurgical changes.      < end of copied text >                          CAPILLARY BLOOD GLUCOSE          RADIOLOGY & ADDITIONAL TESTS:  < from: TTE Echo Complete w/o Contrast w/ Doppler (10.06.20 @ 09:02) >  Summary:   1. Left ventricular ejection fraction, by visual estimation, is 60 to 65%.   2. Mild mitral valve regurgitation.   3. Mild tricuspid regurgitation.   4. Peak transaortic gradient equals 17.3 mmHg, mean transaortic gradient equals 7.4 mmHg, the calculated aortic valve area equals 2.05 cm² by the continuity equation consistent with mild aortic stenosis.    < end of copied text >    Imaging Personally Reviewed:  [X ] YES  [ ] NO    Consultant(s) Notes Reviewed:  [X ] YES  [ ] NO    Care Discussed with Consultants/Other Providers [ X] YES  [ ] NO

## 2020-10-12 NOTE — CONSULT NOTE ADULT - ASSESSMENT
85y Male admitted with  acute hip fracture s/p RIGHT hip IMN POD #6, now with persistent vomiting for the past 3 days    Mr Hammonds did not complain of any pain nor was any tenderness illicited on exam, however his upper abdomen appeared distended and there was dullness to percussion.  He agreed to NGT decompression. Once NGT was inserted approximately 3 liters of Gastric secretions was yielded and patient felt better.  No bile noted, Likely Gastric outlet obstruction    - recommend to continue NGT  - CT of abdomen and pelvis with PO and IV contrast  - Protonix IV   - Will discuss with Dr Ruffin 85y Male admitted with  acute hip fracture s/p RIGHT hip IMN POD #6, now with persistent vomiting for the past 3 days    Mr Hammonds did not complain of any pain nor was any tenderness illicited on exam, however his upper abdomen appeared distended and there was dullness to percussion.  He agreed to NGT decompression. Once NGT was inserted approximately 3 liters of Gastric secretions was yielded and patient felt better.  No bile noted, Likely Gastric outlet obstruction    - recommend to continue NGT  - CT of abdomen and pelvis with PO and IV contrast  - Protonix IV   - May need GI consultation for Endoscopic examination pending CT  - Will discuss with Dr Ruffin

## 2020-10-12 NOTE — CHART NOTE - TREATMENT: THE FOLLOWING DIET HAS BEEN RECOMMENDED
Diet, NPO:   Except Medications  With Ice Chips/Sips of Water (10-11-20 @ 09:49) [Active]  Diet, Regular (10-10-20 @ 17:33) [Available for Activation]    If/when medically feasible initiate Clear Fluids. Gradually advance diet, as tolerated, to Regular + Ensure Enlive x 3/day (provides 1050 kcal, 60 g protein).

## 2020-10-12 NOTE — DIETITIAN INITIAL EVALUATION ADULT. - OTHER INFO
Pt adm w/R-hip fracture s/p fall. Pt s/p R-hip IMN (10/6). Met w/pt at bedside, pt w/NGT for decompression- due to persistent vomiting- draining gastric secretions. Per chart 3 L gastric secretions drained 10/11. Pt reports relief in N/V but wants NGT out. Prior to admission pt reports eating very well at home, 3 meals/daily. Pt reports his wife did food shopping & cooking. Pt reports having upper dentures and denies any difficulty chewing/swallowing. Pt reports UBW of 348# ~3 years ago., Pt states his doctor advised him to lose weight for improved health. Pt states he started following healthy diet- salads, fruits, no desserts, no fried foods, meat without skin/fat, fewer snacks etc and started exercising 3-4 times/week and intentionally lost 129# in the last 3 years (37%).  Pt confirmed height of 75inches. Pt receptive to offer of PO supplement once diet is advanced.

## 2020-10-12 NOTE — CONSULT NOTE ADULT - ASSESSMENT
HPI:  85M otherwise healthy presents to the ED for right hip pain following a mechanical fall. Pt was sitting down in a chair when he missed the chair falling onto his right hip, he experienced immediate right hip pain and swelling. He denies hit to the head, LOC, any other orthopedic injuries. Pt denies SOB, CP, calf pain. (05 Oct 2020 16:02)  ------------ As Above -------------  The patient presented with a mechanical fall and fractured his hip. S/P repair 10/6. Patient developed N/V on 10/10. NGT inserted this AM revealed 3 liters of fluid. CT Scan done afterwards revealed gastric distention > ileus.   Patient felt much better with NGT. No history of PUD. No GI symptoms on admission. On ASA 81 QD.  Prior to admission, the patient denies melena, hematochezia, hematemesis, nausea, vomiting, abdominal pain, constipation, diarrhea, or change in bowel movements Normal colonoscopy 2017.   Flatus but no BMs. Enema without any results.    Goo VS ileus- 1) NGT  2) NPO ( patient drank 3  -  8 ox bottles of water today - discussed with patient and brother ) 3) repeat KUB 4) Increase IV fluid ( patient very thirsty ) HPI:  85M otherwise healthy presents to the ED for right hip pain following a mechanical fall. Pt was sitting down in a chair when he missed the chair falling onto his right hip, he experienced immediate right hip pain and swelling. He denies hit to the head, LOC, any other orthopedic injuries. Pt denies SOB, CP, calf pain. (05 Oct 2020 16:02)  ------------ As Above -------------  The patient presented with a mechanical fall and fractured his hip. S/P repair 10/6. Patient developed N/V on 10/10. NGT inserted this AM revealed 3 liters of fluid. CT Scan done afterwards revealed gastric distention > ileus.   Patient felt much better with NGT. No history of PUD. No GI symptoms on admission. On ASA 81 QD.  Prior to admission, the patient denies melena, hematochezia, hematemesis, nausea, vomiting, abdominal pain, constipation, diarrhea, or change in bowel movements Normal colonoscopy 2017.   Flatus but no BMs. Enema without any results.    Goo VS ileus- 1) NGT  2) NPO ( patient drank 3  -  8 ox bottles of water today - discussed with patient and brother ) 3) repeat KUB 4) Increase IV fluid ( patient very thirsty ) 4) TSH 5) f/u KUB 6) check electrolytes

## 2020-10-12 NOTE — DIETITIAN INITIAL EVALUATION ADULT. - PERTINENT LABORATORY DATA
10-12 Na136 mmol/L Glu 131 mg/dL<H> K+ 4.2 mmol/L Cr  1.01 mg/dL BUN 37 mg/dL<H> 10-11 Phos 3.2 mg/dL

## 2020-10-12 NOTE — DIETITIAN INITIAL EVALUATION ADULT. - PERTINENT MEDS FT
MEDICATIONS  (STANDING):  ascorbic acid 500 milliGRAM(s) Oral two times a day  dextrose 5% + lactated ringers. 1000 milliLiter(s) (75 mL/Hr) IV Continuous <Continuous>  enoxaparin Injectable 100 milliGRAM(s) SubCutaneous two times a day  folic acid 1 milliGRAM(s) Oral daily  levoFLOXacin IVPB      multivitamin 1 Tablet(s) Oral daily    MEDICATIONS  (PRN):  acetaminophen   Tablet .. 975 milliGRAM(s) Oral every 8 hours PRN Moderate Pain (4 - 6)  benzocaine 15 mG/menthol 3.6 mG (Sugar-Free) Lozenge 1 Lozenge Oral every 8 hours PRN Sore Throat  calcium carbonate    500 mG (Tums) Chewable 2 Tablet(s) Chew every 4 hours PRN Heartburn  guaiFENesin   Syrup  (Sugar-Free) 200 milliGRAM(s) Oral every 6 hours PRN Cough  melatonin 3 milliGRAM(s) Oral at bedtime PRN Sleep  ondansetron Injectable 4 milliGRAM(s) IV Push every 6 hours PRN Nausea and/or Vomiting  traMADol 25 milliGRAM(s) Oral every 6 hours PRN Moderate Pain (4 - 6)  traMADol 50 milliGRAM(s) Oral every 6 hours PRN Severe Pain (7 - 10)

## 2020-10-12 NOTE — PROGRESS NOTE ADULT - ASSESSMENT
Assessment:  85y Male s/p RIGHT hip IMN POD #6    -Pain control  -DVT ppx: Xarelto 20mg  -WBAT/OOB with assistance as needed  -PT/OT  -Ice and elevate  -Encourage incentive spirometry  -Discussed case with surgical PA overnight who is going to see patient under attending Dr. Ruffin  -Possible placement of NGT and CT Abd discussed  -DC planning: [facility]   -Ortho stable for D/c  -Will discuss with attending and will advise if plan changes.     Assessment:  85y Male s/p RIGHT hip IMN POD #6    -Pain control: holding narcotics for GI obstruction  -DVT ppx: Xarelto 20mg  -WBAT/OOB with assistance as needed  -PT/OT  -Ice and elevate  -Encourage incentive spirometry  -Discussed case with surgical PA overnight who is going to see patient under attending Dr. Ruffin  -Possible placement of NGT and CT Abd discussed  -DC planning: [facility]   -Ortho stable for D/c  -Will discuss with attending and will advise if plan changes.

## 2020-10-12 NOTE — DIETITIAN INITIAL EVALUATION ADULT. - PHYSICAL APPEARANCE
other (specify)/BMI= 27.2. 2+ edema- R-hip (10/7). No edema documented since 10/7. Nutrition focused physical exam conducted:  Subcutaneous fat loss: [severe ] Orbital fat pads region, [unable/pt partially edentulous]Buccal fat region, [severe ]Ribs region.  Muscle wasting: [severe ]Temples region, [severe ]Clavicle region, [severe ]Shoulder region.

## 2020-10-12 NOTE — PROGRESS NOTE ADULT - ASSESSMENT
84 y/o male with acute hip fracture and Afib. Patient evaluated by cardiology and deemed to have acceptable  cardiac risk for proscedure. POD 2    -Routine post surgical ortho care. pain controlled     UTI   UA ++ LE and nitrite but minimal wbc and epithelial cells. ucx neg. remains afebrile   afebrile    acute blood loss anemia. 2/2 surgery   - cbc stable   - trend cbc. no bloody stool      Afibb   -Echo reviewed  - Pt  agreed to AC so would recommend a NOAC. He reports only falling this one time. understands risk of falling on AC   - c/w  xarelto 20 mg daily and trend cbc. will change to lovenox for now as he is npo     vomitng   -2/2 post op ileus vs gastric outlet syndrome  Had large bm  and passing gas but still vomiting even while NPO so NG was placed and filled 3L.  abdomen remains soft nt. ++BS.  GI consult pending       dispo to [facility] when clinically stable      86 y/o male with acute hip fracture and Afib. Patient evaluated by cardiology and deemed to have acceptable  cardiac risk for proscedure. POD 2    -Routine post surgical ortho care. pain controlled     UTI   UA ++ LE and nitrite but minimal wbc and epithelial cells. ucx neg. remains afebrile   afebrile    acute blood loss anemia. 2/2 surgery   - cbc stable   - trend cbc. no bloody stool      Afibb   -Echo reviewed  - Pt  agreed to AC so would recommend a NOAC. He reports only falling this one time. understands risk of falling on AC   - c/w  xarelto 20 mg daily and trend cbc. will change to lovenox for now as he is npo     vomitng   -2/2 post op ileus vs gastric outlet syndrome  Had large bm  and passing gas but still vomiting even while NPO so NG was placed and filled 3L.  abdomen remains soft nt. ++BS.  GI consult pending     aspiration PNA   - ct shows lower lobe opacities and wbc trending up but is afebrile. will start levaquin as he is high risk for aspiration and chemical pneumonitis     dispo to [facility] when clinically stable

## 2020-10-12 NOTE — DIETITIAN INITIAL EVALUATION ADULT. - ENERGY NEEDS
Height (cm): 190.5 (10-05)  Weight (kg): 98.8 (10-06)  BMI (kg/m2): 27.2 (10-06)  IBW:  89 kg    % IBW: 111%      UBW: 219#      %UBW: 100%

## 2020-10-12 NOTE — CONSULT NOTE ADULT - SUBJECTIVE AND OBJECTIVE BOX
Chief Complaint:  Patient is a 85y old  Male who presents with a chief complaint of right hip fx (12 Oct 2020 06:10)      HPI:  85M otherwise healthy presents to the ED for right hip pain following a mechanical fall. Pt was sitting down in a chair when he missed the chair falling onto his right hip, he experienced immediate right hip pain and swelling. He denies hit to the head, LOC, any other orthopedic injuries. Pt denies SOB, CP, calf pain. (05 Oct 2020 16:02)    Surgical team called for persistent post op vomiting    PMH/PSH:PAST MEDICAL & SURGICAL HISTORY:  No pertinent past medical history    No significant past surgical history        Allergies:  No Known Allergies      Medications:  acetaminophen   Tablet .. 975 milliGRAM(s) Oral every 8 hours PRN  ascorbic acid 500 milliGRAM(s) Oral two times a day  benzocaine 15 mG/menthol 3.6 mG (Sugar-Free) Lozenge 1 Lozenge Oral every 8 hours PRN  calcium carbonate    500 mG (Tums) Chewable 2 Tablet(s) Chew every 4 hours PRN  dextrose 5% + lactated ringers. 1000 milliLiter(s) IV Continuous <Continuous>  folic acid 1 milliGRAM(s) Oral daily  guaiFENesin   Syrup  (Sugar-Free) 200 milliGRAM(s) Oral every 6 hours PRN  melatonin 3 milliGRAM(s) Oral at bedtime PRN  multivitamin 1 Tablet(s) Oral daily  ondansetron Injectable 4 milliGRAM(s) IV Push every 6 hours PRN  rivaroxaban 20 milliGRAM(s) Oral daily      REVIEW OF SYSTEMS:  All other review of systems is negative unless indicated above.    Relevant Family History:   FAMILY HISTORY:      Relevant Social History:  Denies ETOH or tobacco history    Physical Exam:    Vital Signs:  Vital Signs Last 24 Hrs  T(C): 37.2 (11 Oct 2020 23:21), Max: 37.2 (11 Oct 2020 23:21)  T(F): 98.9 (11 Oct 2020 23:21), Max: 98.9 (11 Oct 2020 23:21)  HR: 103 (11 Oct 2020 23:21) (88 - 105)  BP: 111/65 (11 Oct 2020 23:21) (100/81 - 132/79)  BP(mean): --  RR: 17 (11 Oct 2020 23:21) (17 - 18)  SpO2: 96% (11 Oct 2020 23:21) (95% - 98%)  Daily     Daily     Constitutional: WDWN resting comfortably in bed; NAD  HEENT: NC/AT, PERRL, EOMI, anicteric sclera, no nasal discharge; uvula midline, no oropharyngeal erythema or exudates  Neck: supple; no JVD or thyromegaly  Respiratory: CTA B/L; no W/R/R, no retractions  Cardiac: +S1/S2; RRR; no M/R/G; PMI non-displaced  Gastrointestinal: soft, Distended abdomen with dullness to percussion no rebound or guarding; hypoactive+BS   Extremities: , no clubbing or cyanosis; no peripheral edema  Musculoskeletal:  no joint swelling, tenderness or erythema  Vascular: 2+ radial, femoral, DP/PT pulses B/L  Skin: warm, dry and intact; no rashes, wounds, or scars  Neurologic: AAOx3; CNS grossly intact; no focal deficits no asterixis, no tremor, no encephalopathy    Laboratory:                          10.0   10.34 )-----------( 256      ( 11 Oct 2020 09:06 )             30.3     10-11    139  |  100  |  35<H>  ----------------------------<  152<H>  4.4   |  32<H>  |  1.11    Ca    8.9      11 Oct 2020 09:06  Phos  3.2     10-11  Mg     3.0     10-11                Intake and Output    10-10-20 @ 07:01  -  10-11-20 @ 07:00  --------------------------------------------------------  IN: 220 mL / OUT: 1300 mL / NET: -1080 mL    10-11-20 @ 07:01  -  10-12-20 @ 06:18  --------------------------------------------------------  IN: 120 mL / OUT: 1150 mL / NET: -1030 mL        Imaging:

## 2020-10-12 NOTE — DIETITIAN INITIAL EVALUATION ADULT. - ETIOLOGY
inadequate energy, protein intake in the presence of cardiac hx and self-imposed, excessive, unhealthy wt loss

## 2020-10-12 NOTE — CONSULT NOTE ADULT - SUBJECTIVE AND OBJECTIVE BOX
HPI:  85M otherwise healthy presents to the ED for right hip pain following a mechanical fall. Pt was sitting down in a chair when he missed the chair falling onto his right hip, he experienced immediate right hip pain and swelling. He denies hit to the head, LOC, any other orthopedic injuries. Pt denies SOB, CP, calf pain. (05 Oct 2020 16:02)  ------------ As Above -------------  The patient presented with a mechanical fall and fractured his hip. S/P repair 10/6. Patient developed N/V on 10/10. NGT inserted this AM revealed 3 liters of fluid. CT Scan done afterwards revealed gastric distention > ileus.   Patient felt much better with NGT. No history of PUD. No GI symptoms on admission. On ASA 81 QD.  Prior to admission, the patient denies melena, hematochezia, hematemesis, nausea, vomiting, abdominal pain, constipation, diarrhea, or change in bowel movements Normal colonoscopy 2017.   Flatus but no BMs. Enema without any results.      PAST MEDICAL & SURGICAL HISTORY:  No pertinent past medical history    No significant past surgical history        MEDICATIONS  (STANDING):  ascorbic acid 500 milliGRAM(s) Oral two times a day  dextrose 5% + lactated ringers. 1000 milliLiter(s) (75 mL/Hr) IV Continuous <Continuous>  enoxaparin Injectable 100 milliGRAM(s) SubCutaneous two times a day  folic acid 1 milliGRAM(s) Oral daily  levoFLOXacin IVPB      multivitamin 1 Tablet(s) Oral daily    MEDICATIONS  (PRN):  acetaminophen   Tablet .. 975 milliGRAM(s) Oral every 8 hours PRN Moderate Pain (4 - 6)  benzocaine 15 mG/menthol 3.6 mG (Sugar-Free) Lozenge 1 Lozenge Oral every 8 hours PRN Sore Throat  calcium carbonate    500 mG (Tums) Chewable 2 Tablet(s) Chew every 4 hours PRN Heartburn  guaiFENesin   Syrup  (Sugar-Free) 200 milliGRAM(s) Oral every 6 hours PRN Cough  melatonin 3 milliGRAM(s) Oral at bedtime PRN Sleep  ondansetron Injectable 4 milliGRAM(s) IV Push every 6 hours PRN Nausea and/or Vomiting  traMADol 25 milliGRAM(s) Oral every 6 hours PRN Moderate Pain (4 - 6)  traMADol 50 milliGRAM(s) Oral every 6 hours PRN Severe Pain (7 - 10)      Allergies    No Known Allergies    Intolerances        FAMILY HISTORY:      REVIEW OF SYSTEMS:    CONSTITUTIONAL: No fever, weight loss,   EYES: No eye pain, visual disturbances, or discharge  ENMT:  No difficulty hearing, tinnitus, vertigo; No sinus or throat pain  NECK: No pain or stiffness  BREASTS: No pain, masses, or nipple discharge  RESPIRATORY: No cough, wheezing, chills or hemoptysis; No shortness of breath  CARDIOVASCULAR: No chest pain, palpitations, dizziness, or leg swelling  GASTROINTESTINAL: See above   GENITOURINARY: No dysuria, frequency, hematuria, or incontinence  NEUROLOGICAL: No headaches, memory loss, loss of strength, numbness, or tremors  SKIN: No itching, burning, rashes, or lesions   LYMPH NODES: No enlarged glands  ENDOCRINE: No heat or cold intolerance; No hair loss  MUSCULOSKELETAL: No joint pain or swelling; No muscle, back, or extremity pain  PSYCHIATRIC: No depression, anxiety, mood swings, or difficulty sleeping  HEME/LYMPH: No easy bruising, or bleeding gums  ALLERGY AND IMMUNOLOGIC: No hives or eczema          SOCIAL HISTORY:    FAMILY HISTORY:      Vital Signs Last 24 Hrs  T(C): 36.9 (12 Oct 2020 11:29), Max: 37.2 (11 Oct 2020 23:21)  T(F): 98.4 (12 Oct 2020 11:29), Max: 98.9 (11 Oct 2020 23:21)  HR: 93 (12 Oct 2020 11:29) (93 - 103)  BP: 104/74 (12 Oct 2020 11:29) (104/74 - 118/69)  BP(mean): --  RR: 16 (12 Oct 2020 11:29) (16 - 18)  SpO2: 100% (12 Oct 2020 11:29) (94% - 100%)    PHYSICAL EXAM:    GENERAL: NAD, well-groomed, well-developed  HEAD:  Atraumatic, Normocephalic  EYES: EOMI, PERRLA, conjunctiva and sclera clear  NECK: Supple, No JVD, Normal thyroid  NERVOUS SYSTEM:  Alert & Oriented X3, Good concentration; Motor Strength 5/5 B/L upper and lower extremities;  CHEST/LUNG: Clear to percussion bilaterally; No rales, rhonchi, wheezing, or rubs  HEART: Regular rate and rhythm; No murmurs, rubs, or gallops  ABDOMEN: Soft, Nontender, Nondistended; Bowel sounds present  EXTREMITIES:  2+ Peripheral Pulses, No clubbing, cyanosis, or edema  LYMPH: No lymphadenopathy noted   RECTAL:  Deferred   SKIN: No rashes or lesions    LABS:                        9.8    13.31 )-----------( 211      ( 12 Oct 2020 06:37 )             29.5       CBC:  10-12 @ 06:37  WBC  13.31  HGB 9.8  HCT 29.5 Plate 211  MCV 97.0  10-11 @ 09:06  WBC  10.34  HGB 10.0  HCT 30.3 Plate 256  MCV 97.7  10-09 @ 06:43  WBC  9.08  HGB 8.9  HCT 28.0 Plate 163  MCV 97.9  10-08 @ 06:37  WBC  9.50  HGB 9.3  HCT 28.0 Plate 141  MCV 95.2  10-07 @ 07:41  WBC  9.59  HGB 10.6  HCT 31.7 Plate 147  MCV 95.2  10-06 @ 17:31  WBC  12.44  HGB 11.7  HCT 36.3 Plate 166  MCV 96.5  10-06 @ 08:12  WBC  8.40  HGB 12.6  HCT 38.5 Plate 163  MCV 95.1           12 Oct 2020 06:37    136    |  97     |  37     ----------------------------<  131    4.2     |  33     |  1.01   11 Oct 2020 09:06    139    |  100    |  35     ----------------------------<  152    4.4     |  32     |  1.11   09 Oct 2020 06:43    137    |  103    |  33     ----------------------------<  123    4.7     |  31     |  0.97   08 Oct 2020 06:37    135    |  103    |  40     ----------------------------<  112    4.4     |  27     |  1.23   07 Oct 2020 07:41    136    |  104    |  36     ----------------------------<  127    4.7     |  26     |  1.24   06 Oct 2020 17:31    137    |  106    |  33     ----------------------------<  112    5.2     |  28     |  1.36   06 Oct 2020 08:12    140    |  106    |  33     ----------------------------<  129    4.8     |  27     |  1.32     Ca    8.7        12 Oct 2020 06:37  Ca    8.9        11 Oct 2020 09:06  Ca    8.6        09 Oct 2020 06:43  Ca    8.6        08 Oct 2020 06:37  Ca    8.7        07 Oct 2020 07:41  Ca    8.8        06 Oct 2020 17:31  Ca    9.1        06 Oct 2020 08:12  Phos  3.2       11 Oct 2020 09:06  Mg     3.0       11 Oct 2020 09:06              RADIOLOGY & ADDITIONAL STUDIES:  < from: CT Abdomen and Pelvis w/ Oral Cont and w/ IV Cont (10.12.20 @ 07:12) >    EXAM:  CT ABDOMEN AND PELVIS OC IC                            PROCEDURE DATE:  10/12/2020          INTERPRETATION:  CLINICAL INFORMATION: Vomiting    COMPARISON: None.    PROCEDURE:  CT of the Abdomen and Pelvis was performed with intravenous contrast.  Intravenous contrast: 96 ml Omnipaque 350. 4 ml discarded.  Oral contrast: positive contrast was administered.  Sagittal and coronal reformats were performed.    FINDINGS:  LOWER CHEST: Bilateral lower lobe opacities. Trace pleural effusions. Aortic valve and coronary artery calcifications.    LIVER: Mildly nodular contour.  BILE DUCTS: Normal caliber.  GALLBLADDER: Within normal limits.  SPLEEN: Within normal limits.  PANCREAS: Atrophic.  ADRENALS: Within normal limits.  KIDNEYS/URETERS: 5 x 4.5 cm left upper pole renal mass. Additional subcentimeter renal hypodensities are too small to further characterize. Renal cortical scarring. No hydronephrosis.    BLADDER: Right posterolateral urinary bladder intraluminal nodule measuring 1.7 cm.  REPRODUCTIVE ORGANS: Prostate is enlarged.    BOWEL: Enteric tube with tip in the stomach. Distended stomach. No small bowel obstruction. Appendix is normal. Colonic diverticulosis.  PERITONEUM: No ascites.  VESSELS: Atherosclerotic changes.  RETROPERITONEUM/LYMPH NODES: No lymphadenopathy.  ABDOMINAL WALL: Subcutaneous soft tissue edema. Right hip postsurgical changes, including emphysema and soft tissue edema.  BONES: Degenerative changes. Status post right femur ORIF.    IMPRESSION:    1. Lower lobe opacities, which may represent atelectasis and/or pneumonia.  2. Gastric distention. Enteric tube in place.  3. Left upper pole renal mass (5 x 4.5 cm), suspicious for renal malignancy.  4. Urinary bladder intraluminal nodule (1.7 cm). Recommend direct visualization with cystoscopy for further assessment.  5. Colonic diverticulosis without CT evidence of acute diverticulitis.  6. Right hip postsurgical changes.              ALANA BERMUDEZ M.D., ATTENDING RADIOLOGIST  This document has been electronically signed. Oct 12 2020  8:08AM    < end of copied text >

## 2020-10-12 NOTE — DIETITIAN INITIAL EVALUATION ADULT. - ADD RECOMMEND
If/when medically feasible initiate Clear Fluids. Gradually advance diet, as tolerated, to Regular + Ensure Enlive x 3/day (provides 1050 kcal, 60 g protein).

## 2020-10-13 LAB
ANION GAP SERPL CALC-SCNC: 6 MMOL/L — SIGNIFICANT CHANGE UP (ref 5–17)
BUN SERPL-MCNC: 28 MG/DL — HIGH (ref 7–23)
CALCIUM SERPL-MCNC: 8.7 MG/DL — SIGNIFICANT CHANGE UP (ref 8.5–10.1)
CHLORIDE SERPL-SCNC: 100 MMOL/L — SIGNIFICANT CHANGE UP (ref 96–108)
CO2 SERPL-SCNC: 32 MMOL/L — HIGH (ref 22–31)
CREAT SERPL-MCNC: 0.76 MG/DL — SIGNIFICANT CHANGE UP (ref 0.5–1.3)
GLUCOSE SERPL-MCNC: 92 MG/DL — SIGNIFICANT CHANGE UP (ref 70–99)
HCT VFR BLD CALC: 27.2 % — LOW (ref 39–50)
HGB BLD-MCNC: 8.7 G/DL — LOW (ref 13–17)
MAGNESIUM SERPL-MCNC: 2.3 MG/DL — SIGNIFICANT CHANGE UP (ref 1.6–2.6)
MCHC RBC-ENTMCNC: 31.4 PG — SIGNIFICANT CHANGE UP (ref 27–34)
MCHC RBC-ENTMCNC: 32 GM/DL — SIGNIFICANT CHANGE UP (ref 32–36)
MCV RBC AUTO: 98.2 FL — SIGNIFICANT CHANGE UP (ref 80–100)
NRBC # BLD: 0 /100 WBCS — SIGNIFICANT CHANGE UP (ref 0–0)
PHOSPHATE SERPL-MCNC: 3.2 MG/DL — SIGNIFICANT CHANGE UP (ref 2.5–4.5)
PLATELET # BLD AUTO: 255 K/UL — SIGNIFICANT CHANGE UP (ref 150–400)
POTASSIUM SERPL-MCNC: 3.3 MMOL/L — LOW (ref 3.5–5.3)
POTASSIUM SERPL-SCNC: 3.3 MMOL/L — LOW (ref 3.5–5.3)
RBC # BLD: 2.77 M/UL — LOW (ref 4.2–5.8)
RBC # FLD: 15.1 % — HIGH (ref 10.3–14.5)
SODIUM SERPL-SCNC: 138 MMOL/L — SIGNIFICANT CHANGE UP (ref 135–145)
TSH SERPL-MCNC: 1 UU/ML — SIGNIFICANT CHANGE UP (ref 0.36–3.74)
WBC # BLD: 9.31 K/UL — SIGNIFICANT CHANGE UP (ref 3.8–10.5)
WBC # FLD AUTO: 9.31 K/UL — SIGNIFICANT CHANGE UP (ref 3.8–10.5)

## 2020-10-13 PROCEDURE — 99233 SBSQ HOSP IP/OBS HIGH 50: CPT

## 2020-10-13 PROCEDURE — 74018 RADEX ABDOMEN 1 VIEW: CPT | Mod: 26

## 2020-10-13 RX ORDER — POTASSIUM CHLORIDE 20 MEQ
10 PACKET (EA) ORAL
Refills: 0 | Status: COMPLETED | OUTPATIENT
Start: 2020-10-13 | End: 2020-10-13

## 2020-10-13 RX ADMIN — Medication 100 MILLIEQUIVALENT(S): at 13:01

## 2020-10-13 RX ADMIN — TRAMADOL HYDROCHLORIDE 50 MILLIGRAM(S): 50 TABLET ORAL at 14:16

## 2020-10-13 RX ADMIN — Medication 1 TABLET(S): at 12:00

## 2020-10-13 RX ADMIN — ENOXAPARIN SODIUM 100 MILLIGRAM(S): 100 INJECTION SUBCUTANEOUS at 06:13

## 2020-10-13 RX ADMIN — ENOXAPARIN SODIUM 100 MILLIGRAM(S): 100 INJECTION SUBCUTANEOUS at 18:55

## 2020-10-13 RX ADMIN — TRAMADOL HYDROCHLORIDE 50 MILLIGRAM(S): 50 TABLET ORAL at 14:56

## 2020-10-13 RX ADMIN — Medication 500 MILLIGRAM(S): at 06:14

## 2020-10-13 RX ADMIN — Medication 100 MILLIEQUIVALENT(S): at 14:16

## 2020-10-13 RX ADMIN — SODIUM CHLORIDE 75 MILLILITER(S): 9 INJECTION, SOLUTION INTRAVENOUS at 11:59

## 2020-10-13 RX ADMIN — Medication 100 MILLIEQUIVALENT(S): at 11:59

## 2020-10-13 RX ADMIN — Medication 1 MILLIGRAM(S): at 12:00

## 2020-10-13 NOTE — PROGRESS NOTE ADULT - SUBJECTIVE AND OBJECTIVE BOX
Surgery PA Progress Note     S: Patient resting comfortably on morning rounds. NGT in place. Denies abdominal pain. Denies nausea or vomiting. Passing flatus. No new symptoms reported.       MEDICATIONS  (STANDING):  ascorbic acid 500 milliGRAM(s) Oral two times a day  dextrose 5% + lactated ringers. 1000 milliLiter(s) (125 mL/Hr) IV Continuous <Continuous>  dextrose 5% + lactated ringers. 1000 milliLiter(s) (75 mL/Hr) IV Continuous <Continuous>  enoxaparin Injectable 100 milliGRAM(s) SubCutaneous two times a day  folic acid 1 milliGRAM(s) Oral daily  levoFLOXacin IVPB      levoFLOXacin IVPB 500 milliGRAM(s) IV Intermittent every 24 hours  multivitamin 1 Tablet(s) Oral daily    MEDICATIONS  (PRN):  acetaminophen   Tablet .. 975 milliGRAM(s) Oral every 8 hours PRN Moderate Pain (4 - 6)  benzocaine 15 mG/menthol 3.6 mG (Sugar-Free) Lozenge 1 Lozenge Oral every 8 hours PRN Sore Throat  calcium carbonate    500 mG (Tums) Chewable 2 Tablet(s) Chew every 4 hours PRN Heartburn  guaiFENesin   Syrup  (Sugar-Free) 200 milliGRAM(s) Oral every 6 hours PRN Cough  melatonin 3 milliGRAM(s) Oral at bedtime PRN Sleep  ondansetron Injectable 4 milliGRAM(s) IV Push every 6 hours PRN Nausea and/or Vomiting  traMADol 25 milliGRAM(s) Oral every 6 hours PRN Moderate Pain (4 - 6)  traMADol 50 milliGRAM(s) Oral every 6 hours PRN Severe Pain (7 - 10)      Physical Exam:    T(C): 36.4 (10-13-20 @ 05:00), Max: 36.9 (10-12-20 @ 11:29)  HR: 97 (10-13-20 @ 05:00) (63 - 108)  BP: 114/73 (10-13-20 @ 05:00) (104/74 - 114/73)  RR: 18 (10-13-20 @ 05:00) (16 - 18)  SpO2: 96% (10-13-20 @ 05:00) (94% - 100%)      10-12-20 @ 07:01  -  10-13-20 @ 07:00  --------------------------------------------------------  IN: 1775 mL / OUT: 3250 mL / NET: -1475 mL        Constitutional: WDWN resting comfortably in bed; NAD  Neurologic: AAOx3; CNS grossly intact; no focal deficits  Respiratory: CTA B/L; nonlabored breathing  Cardiac: +S1/S2; RRR  Gastrointestinal: soft, non-distended, + BS, no rebound or guarding;   NGT with copious output, non-bilious, non-bloody.  Extremities: , no clubbing or cyanosis; no peripheral edema  Skin: warm, dry and intact; no rashes, R Hip wound dressing c/d/i          LABS:                        8.7    9.31  )-----------( 255      ( 13 Oct 2020 06:28 )             27.2     10-13    138  |  100  |  28<H>  ----------------------------<  92  3.3<L>   |  32<H>  |  0.76    Ca    8.7      13 Oct 2020 06:28  Phos  3.2     10-13  Mg     2.3     10-13

## 2020-10-13 NOTE — PROGRESS NOTE ADULT - ASSESSMENT
86 y/o male with acute hip fracture and Afib.     -Routine post surgical ortho care. pain controlled   -Staples to be removed POD 14   Ortho stable for DC, Outpatient follow up        UTI   UA ++ LE and nitrite but minimal wbc and epithelial cells. ucx neg. remains afebrile   afebrile    acute blood loss anemia. 2/2 surgery   - cbc stable   - trend cbc. no bloody stool      Afibb   -Echo reviewed  - Pt  agreed to AC so would recommend a NOAC. He reports only falling this one time. understands risk of falling on AC   - restart  xarelto 20 mg daily when able. will change to lovenox for now as he is npo     vomitng   -2/2 post op ileus vs gastric outlet syndrome  Had large bm  and passing gas but was vomiting even while NPO so NG was placed and filled 3L.  output decreasing abdomen remains soft nt. ++BS.  GI consult appreciated f.u repeat KUB.     aspiration PNA   - ct shows lower lobe opacities and wbc stable and afebrile. will start levaquin as he is high risk for aspiration and chemical pneumonitis     renal mass   - incidentally found on ct  - needs follow up with urologist   - pt aware but will remind the pt prior to dc     dispo to [facility] when clinically stable

## 2020-10-13 NOTE — PROGRESS NOTE ADULT - ASSESSMENT
85y Male admitted with  acute hip fracture s/p RIGHT hip IMN POD #6, now with GOO vs post-op ileus. NGT in place.     - continue NGT for now and monitor GI function  - continue Protonix IV   - appreciate GI input  - Will discuss with Dr Ruffin

## 2020-10-13 NOTE — PROGRESS NOTE ADULT - ASSESSMENT
HPI:  85M otherwise healthy presents to the ED for right hip pain following a mechanical fall. Pt was sitting down in a chair when he missed the chair falling onto his right hip, he experienced immediate right hip pain and swelling. He denies hit to the head, LOC, any other orthopedic injuries. Pt denies SOB, CP, calf pain. (05 Oct 2020 16:02)  ------------ As Above -------------  The patient presented with a mechanical fall and fractured his hip. S/P repair 10/6. Patient developed N/V on 10/10. NGT inserted this AM revealed 3 liters of fluid. CT Scan done afterwards revealed gastric distention > ileus.   Patient felt much better with NGT. No history of PUD. No GI symptoms on admission. On ASA 81 QD.  Prior to admission, the patient denies melena, hematochezia, hematemesis, nausea, vomiting, abdominal pain, constipation, diarrhea, or change in bowel movements Normal colonoscopy 2017.   Flatus but no BMs. Enema without any results.    Goo VS ileus-  In process of having NGT clamped. 1) Continue as per surgery  2) NPO  3) repeat KUB 4) Increase IV fluid ( patient very thirsty ) 4) TSH 5) f/u KUB 6) check electrolytes

## 2020-10-13 NOTE — PROGRESS NOTE ADULT - SUBJECTIVE AND OBJECTIVE BOX
HPI:  85M otherwise healthy presents to the ED for right hip pain following a mechanical fall. Pt was sitting down in a chair when he missed the chair falling onto his right hip, he experienced immediate right hip pain and swelling. He denies hit to the head, LOC, any other orthopedic injuries. Pt denies SOB, CP, calf pain. (05 Oct 2020 16:02)    Patient is a 85y old  Male who presents with a chief complaint of right hip fx (06 Oct 2020 17:16)      INTERVAL HPI/OVERNIGHT EVENTS: NG output decreasing. feeling much better. passing gas     MEDICATIONS  (STANDING):  ascorbic acid 500 milliGRAM(s) Oral two times a day  dextrose 5% + lactated ringers. 1000 milliLiter(s) (125 mL/Hr) IV Continuous <Continuous>  dextrose 5% + lactated ringers. 1000 milliLiter(s) (75 mL/Hr) IV Continuous <Continuous>  enoxaparin Injectable 100 milliGRAM(s) SubCutaneous two times a day  folic acid 1 milliGRAM(s) Oral daily  levoFLOXacin IVPB      levoFLOXacin IVPB 500 milliGRAM(s) IV Intermittent every 24 hours  multivitamin 1 Tablet(s) Oral daily  potassium chloride  10 mEq/100 mL IVPB 10 milliEquivalent(s) IV Intermittent every 1 hour    MEDICATIONS  (PRN):  acetaminophen   Tablet .. 975 milliGRAM(s) Oral every 8 hours PRN Moderate Pain (4 - 6)  benzocaine 15 mG/menthol 3.6 mG (Sugar-Free) Lozenge 1 Lozenge Oral every 8 hours PRN Sore Throat  calcium carbonate    500 mG (Tums) Chewable 2 Tablet(s) Chew every 4 hours PRN Heartburn  guaiFENesin   Syrup  (Sugar-Free) 200 milliGRAM(s) Oral every 6 hours PRN Cough  melatonin 3 milliGRAM(s) Oral at bedtime PRN Sleep  ondansetron Injectable 4 milliGRAM(s) IV Push every 6 hours PRN Nausea and/or Vomiting  traMADol 25 milliGRAM(s) Oral every 6 hours PRN Moderate Pain (4 - 6)  traMADol 50 milliGRAM(s) Oral every 6 hours PRN Severe Pain (7 - 10)            Allergies    No Known Allergies    Intolerances        REVIEW OF SYSTEMS:  CONSTITUTIONAL: No fever, weight loss, or fatigue  EYES: No eye pain, visual disturbances, or discharge  ENMT:  No difficulty hearing, tinnitus, vertigo; No sinus or throat pain  NECK: No pain or stiffness  BREASTS: No pain, masses, or nipple discharge  RESPIRATORY: No cough, wheezing, chills or hemoptysis; No shortness of breath  CARDIOVASCULAR: No chest pain, palpitations, dizziness, or leg swelling  GASTROINTESTINAL: No abdominal or epigastric pain. No nausea, vomiting, or hematemesis; No diarrhea or constipation. No melena or hematochezia.  GENITOURINARY: No dysuria, frequency, hematuria, or incontinence  NEUROLOGICAL: No headaches, memory loss, loss of strength, numbness, or tremors  SKIN: No itching, burning, rashes, or lesions   LYMPH NODES: No enlarged glands  ENDOCRINE: No heat or cold intolerance; No hair loss  MUSCULOSKELETAL: No joint pain or swelling; No muscle, back, or extremity pain  PSYCHIATRIC: No depression, anxiety, mood swings, or difficulty sleeping  HEME/LYMPH: No easy bruising, or bleeding gums  ALLERGY AND IMMUNOLOGIC: No hives or eczema    Vital Signs Last 24 Hrs  T(C): 36.4 (13 Oct 2020 05:00), Max: 36.8 (12 Oct 2020 23:30)  T(F): 97.5 (13 Oct 2020 05:00), Max: 98.3 (12 Oct 2020 23:30)  HR: 97 (13 Oct 2020 05:00) (63 - 108)  BP: 114/73 (13 Oct 2020 05:00) (108/66 - 114/73)  BP(mean): --  RR: 18 (13 Oct 2020 05:00) (17 - 18)  SpO2: 96% (13 Oct 2020 05:00) (94% - 99%)      PHYSICAL EXAM:  GENERAL: NAD, well-groomed, well-developed  HEAD:  Atraumatic, Normocephalic  EYES: EOMI, PERRLA, conjunctiva and sclera clear  ENMT: No tonsillar erythema, exudates, or enlargement; Moist mucous membranes, Good dentition, No lesions  NECK: Supple, No JVD, Normal thyroid  NERVOUS SYSTEM:  Alert & Oriented X3, Good concentration; Motor Strength 5/5 B/L upper and lower extremities; DTRs 2+ intact and symmetric  CHEST/LUNG: Clear to percussion bilaterally; No rales, rhonchi, wheezing, or rubs  HEART: Regular rate and rhythm; No murmurs, rubs, or gallops  ABDOMEN: Soft, Nontender, Nondistended; Bowel sounds present  EXTREMITIES:  right leg wound dressed   LYMPH: No lymphadenopathy noted  SKIN: No rashes or lesions    LABS:                                                    8.7    9.31  )-----------( 255      ( 13 Oct 2020 06:28 )             27.2     10-13    138  |  100  |  28<H>  ----------------------------<  92  3.3<L>   |  32<H>  |  0.76    Ca    8.7      13 Oct 2020 06:28  Phos  3.2     10-13  Mg     2.3     10-13                      ct< from: CT Abdomen and Pelvis w/ Oral Cont and w/ IV Cont (10.12.20 @ 07:12) >  IMPRESSION:    1. Lower lobe opacities, which may represent atelectasis and/or pneumonia.  2. Gastric distention. Enteric tube in place.  3. Left upper pole renal mass (5 x 4.5 cm), suspicious for renal malignancy.  4. Urinary bladder intraluminal nodule (1.7 cm). Recommend direct visualization with cystoscopy for further assessment.  5. Colonic diverticulosis without CT evidence of acute diverticulitis.  6. Right hip postsurgical changes.      < end of copied text >                          CAPILLARY BLOOD GLUCOSE          RADIOLOGY & ADDITIONAL TESTS:  < from: TTE Echo Complete w/o Contrast w/ Doppler (10.06.20 @ 09:02) >  Summary:   1. Left ventricular ejection fraction, by visual estimation, is 60 to 65%.   2. Mild mitral valve regurgitation.   3. Mild tricuspid regurgitation.   4. Peak transaortic gradient equals 17.3 mmHg, mean transaortic gradient equals 7.4 mmHg, the calculated aortic valve area equals 2.05 cm² by the continuity equation consistent with mild aortic stenosis.    < end of copied text >    Imaging Personally Reviewed:  [X ] YES  [ ] NO    Consultant(s) Notes Reviewed:  [X ] YES  [ ] NO    Care Discussed with Consultants/Other Providers [ X] YES  [ ] NO

## 2020-10-13 NOTE — PROGRESS NOTE ADULT - SUBJECTIVE AND OBJECTIVE BOX
Patient is a 85y old  Male who presents with a chief complaint of right hip fx (13 Oct 2020 12:26)      HPI:  85M otherwise healthy presents to the ED for right hip pain following a mechanical fall. Pt was sitting down in a chair when he missed the chair falling onto his right hip, he experienced immediate right hip pain and swelling. He denies hit to the head, LOC, any other orthopedic injuries. Pt denies SOB, CP, calf pain. (05 Oct 2020 16:02)      INTERVAL HPI/OVERNIGHT EVENTS:  Patient seen earlier today  NGT output 500 /night, 100 from 7 AM - 1 PM  Flatus but no BM    MEDICATIONS  (STANDING):  ascorbic acid 500 milliGRAM(s) Oral two times a day  dextrose 5% + lactated ringers. 1000 milliLiter(s) (125 mL/Hr) IV Continuous <Continuous>  dextrose 5% + lactated ringers. 1000 milliLiter(s) (75 mL/Hr) IV Continuous <Continuous>  enoxaparin Injectable 100 milliGRAM(s) SubCutaneous two times a day  folic acid 1 milliGRAM(s) Oral daily  levoFLOXacin IVPB      levoFLOXacin IVPB 500 milliGRAM(s) IV Intermittent every 24 hours  multivitamin 1 Tablet(s) Oral daily    MEDICATIONS  (PRN):  acetaminophen   Tablet .. 975 milliGRAM(s) Oral every 8 hours PRN Moderate Pain (4 - 6)  benzocaine 15 mG/menthol 3.6 mG (Sugar-Free) Lozenge 1 Lozenge Oral every 8 hours PRN Sore Throat  calcium carbonate    500 mG (Tums) Chewable 2 Tablet(s) Chew every 4 hours PRN Heartburn  guaiFENesin   Syrup  (Sugar-Free) 200 milliGRAM(s) Oral every 6 hours PRN Cough  melatonin 3 milliGRAM(s) Oral at bedtime PRN Sleep  ondansetron Injectable 4 milliGRAM(s) IV Push every 6 hours PRN Nausea and/or Vomiting  traMADol 25 milliGRAM(s) Oral every 6 hours PRN Moderate Pain (4 - 6)  traMADol 50 milliGRAM(s) Oral every 6 hours PRN Severe Pain (7 - 10)      FAMILY HISTORY:      Allergies    No Known Allergies    Intolerances        PMH/PSH:  No pertinent past medical history    No significant past surgical history          REVIEW OF SYSTEMS:  CONSTITUTIONAL: No fever, weight loss, or fatigue  EYES: No eye pain, visual disturbances, or discharge  ENMT:  No difficulty hearing, tinnitus, vertigo; No sinus or throat pain  NECK: No pain or stiffness  BREASTS: No pain, masses, or nipple discharge  RESPIRATORY: No cough, wheezing, chills or hemoptysis; No shortness of breath  CARDIOVASCULAR: No chest pain, palpitations, dizziness, or leg swelling  GASTROINTESTINAL: See above  GENITOURINARY: No dysuria, frequency, hematuria, or incontinence  NEUROLOGICAL: No headaches, memory loss, loss of strength, numbness, or tremors  SKIN: No itching, burning, rashes, or lesions   LYMPH NODES: No enlarged glands  ENDOCRINE: No heat or cold intolerance; No hair loss  MUSCULOSKELETAL: No joint pain or swelling; No muscle, back, or extremity pain  PSYCHIATRIC: No depression, anxiety, mood swings, or difficulty sleeping  HEME/LYMPH: No easy bruising, or bleeding gums  ALLERGY AND IMMUNOLOGIC: No hives or eczema    Vital Signs Last 24 Hrs  T(C): 37.1 (13 Oct 2020 17:20), Max: 37.1 (13 Oct 2020 17:20)  T(F): 98.7 (13 Oct 2020 17:20), Max: 98.7 (13 Oct 2020 17:20)  HR: 107 (13 Oct 2020 17:20) (63 - 107)  BP: 118/71 (13 Oct 2020 17:20) (104/71 - 118/71)  BP(mean): --  RR: 17 (13 Oct 2020 17:20) (17 - 18)  SpO2: 97% (13 Oct 2020 17:20) (96% - 99%)    PHYSICAL EXAM:  GENERAL: NAD, well-groomed, well-developed  HEAD:  Atraumatic, Normocephalic  EYES: EOMI, PERRLA, conjunctiva and sclera clear  NECK: Supple, No JVD, Normal thyroid  NERVOUS SYSTEM:  Alert & Oriented X3, Good concentration;   CHEST/LUNG: Clear to percussion bilaterally; No rales, rhonchi, wheezing, or rubs  HEART: Regular rate and rhythm; No murmurs, rubs, or gallops  ABDOMEN: Soft, Nontender, Nondistended; Bowel sounds present  EXTREMITIES:  2+ Peripheral Pulses, No clubbing, cyanosis, or edema  LYMPH: No lymphadenopathy noted  SKIN: No rashes or lesions    LAB                          8.7    9.31  )-----------( 255      ( 13 Oct 2020 06:28 )             27.2       CBC:  10-13 @ 06:28  WBC 9.31   Hgb 8.7   Hct 27.2   Plts 255  MCV 98.2  10-12 @ 06:37  WBC 13.31   Hgb 9.8   Hct 29.5   Plts 211  MCV 97.0  10-11 @ 09:06  WBC 10.34   Hgb 10.0   Hct 30.3   Plts 256  MCV 97.7  10-09 @ 06:43  WBC 9.08   Hgb 8.9   Hct 28.0   Plts 163  MCV 97.9  10-08 @ 06:37  WBC 9.50   Hgb 9.3   Hct 28.0   Plts 141  MCV 95.2  10-07 @ 07:41  WBC 9.59   Hgb 10.6   Hct 31.7   Plts 147  MCV 95.2      Chemistry:  10-13 @ 06:28  Na+ 138  K+ 3.3  Cl- 100  CO2 32  BUN 28  Cr 0.76     10-12 @ 06:37  Na+ 136  K+ 4.2  Cl- 97  CO2 33  BUN 37  Cr 1.01     10-11 @ 09:06  Na+ 139  K+ 4.4  Cl- 100  CO2 32  BUN 35  Cr 1.11     10-09 @ 06:43  Na+ 137  K+ 4.7  Cl- 103  CO2 31  BUN 33  Cr 0.97     10-08 @ 06:37  Na+ 135  K+ 4.4  Cl- 103  CO2 27  BUN 40  Cr 1.23     10-07 @ 07:41  Na+ 136  K+ 4.7  Cl- 104  CO2 26  BUN 36  Cr 1.24         Glucose, Serum: 92 mg/dL (10-13 @ 06:28)  Glucose, Serum: 131 mg/dL (10-12 @ 06:37)  Glucose, Serum: 152 mg/dL (10-11 @ 09:06)  Glucose, Serum: 123 mg/dL (10-09 @ 06:43)  Glucose, Serum: 112 mg/dL (10-08 @ 06:37)  Glucose, Serum: 127 mg/dL (10-07 @ 07:41)      13 Oct 2020 06:28    138    |  100    |  28     ----------------------------<  92     3.3     |  32     |  0.76   12 Oct 2020 06:37    136    |  97     |  37     ----------------------------<  131    4.2     |  33     |  1.01   11 Oct 2020 09:06    139    |  100    |  35     ----------------------------<  152    4.4     |  32     |  1.11   09 Oct 2020 06:43    137    |  103    |  33     ----------------------------<  123    4.7     |  31     |  0.97   08 Oct 2020 06:37    135    |  103    |  40     ----------------------------<  112    4.4     |  27     |  1.23   07 Oct 2020 07:41    136    |  104    |  36     ----------------------------<  127    4.7     |  26     |  1.24     Ca    8.7        13 Oct 2020 06:28  Ca    8.7        12 Oct 2020 06:37  Ca    8.9        11 Oct 2020 09:06  Ca    8.6        09 Oct 2020 06:43  Ca    8.6        08 Oct 2020 06:37  Ca    8.7        07 Oct 2020 07:41  Phos  3.2       13 Oct 2020 06:28  Phos  3.2       11 Oct 2020 09:06  Mg     2.3       13 Oct 2020 06:28  Mg     3.0       11 Oct 2020 09:06                CAPILLARY BLOOD GLUCOSE              RADIOLOGY & ADDITIONAL TESTS:    Imaging Personally Reviewed:  [ ] YES  [ ] NO    Consultant(s) Notes Reviewed:  [ ] YES  [ ] NO    Care Discussed with Consultants/Other Providers [ ] YES  [ ] NO

## 2020-10-14 LAB
ANION GAP SERPL CALC-SCNC: 7 MMOL/L — SIGNIFICANT CHANGE UP (ref 5–17)
BUN SERPL-MCNC: 26 MG/DL — HIGH (ref 7–23)
CALCIUM SERPL-MCNC: 8.9 MG/DL — SIGNIFICANT CHANGE UP (ref 8.5–10.1)
CHLORIDE SERPL-SCNC: 99 MMOL/L — SIGNIFICANT CHANGE UP (ref 96–108)
CO2 SERPL-SCNC: 33 MMOL/L — HIGH (ref 22–31)
CREAT SERPL-MCNC: 0.81 MG/DL — SIGNIFICANT CHANGE UP (ref 0.5–1.3)
GLUCOSE SERPL-MCNC: 99 MG/DL — SIGNIFICANT CHANGE UP (ref 70–99)
HCT VFR BLD CALC: 30.4 % — LOW (ref 39–50)
HGB BLD-MCNC: 9.7 G/DL — LOW (ref 13–17)
MCHC RBC-ENTMCNC: 31.5 PG — SIGNIFICANT CHANGE UP (ref 27–34)
MCHC RBC-ENTMCNC: 31.9 GM/DL — LOW (ref 32–36)
MCV RBC AUTO: 98.7 FL — SIGNIFICANT CHANGE UP (ref 80–100)
NRBC # BLD: 0 /100 WBCS — SIGNIFICANT CHANGE UP (ref 0–0)
PLATELET # BLD AUTO: 273 K/UL — SIGNIFICANT CHANGE UP (ref 150–400)
POTASSIUM SERPL-MCNC: 3.4 MMOL/L — LOW (ref 3.5–5.3)
POTASSIUM SERPL-SCNC: 3.4 MMOL/L — LOW (ref 3.5–5.3)
RBC # BLD: 3.08 M/UL — LOW (ref 4.2–5.8)
RBC # FLD: 15.4 % — HIGH (ref 10.3–14.5)
SODIUM SERPL-SCNC: 139 MMOL/L — SIGNIFICANT CHANGE UP (ref 135–145)
WBC # BLD: 7.8 K/UL — SIGNIFICANT CHANGE UP (ref 3.8–10.5)
WBC # FLD AUTO: 7.8 K/UL — SIGNIFICANT CHANGE UP (ref 3.8–10.5)

## 2020-10-14 PROCEDURE — 74018 RADEX ABDOMEN 1 VIEW: CPT | Mod: 26

## 2020-10-14 PROCEDURE — 99233 SBSQ HOSP IP/OBS HIGH 50: CPT

## 2020-10-14 RX ADMIN — SODIUM CHLORIDE 75 MILLILITER(S): 9 INJECTION, SOLUTION INTRAVENOUS at 05:24

## 2020-10-14 RX ADMIN — Medication 500 MILLIGRAM(S): at 05:19

## 2020-10-14 RX ADMIN — SODIUM CHLORIDE 75 MILLILITER(S): 9 INJECTION, SOLUTION INTRAVENOUS at 07:12

## 2020-10-14 RX ADMIN — TRAMADOL HYDROCHLORIDE 50 MILLIGRAM(S): 50 TABLET ORAL at 01:48

## 2020-10-14 RX ADMIN — ENOXAPARIN SODIUM 100 MILLIGRAM(S): 100 INJECTION SUBCUTANEOUS at 05:20

## 2020-10-14 RX ADMIN — TRAMADOL HYDROCHLORIDE 50 MILLIGRAM(S): 50 TABLET ORAL at 02:18

## 2020-10-14 RX ADMIN — Medication 1 TABLET(S): at 13:02

## 2020-10-14 RX ADMIN — Medication 1 MILLIGRAM(S): at 13:01

## 2020-10-14 RX ADMIN — ENOXAPARIN SODIUM 100 MILLIGRAM(S): 100 INJECTION SUBCUTANEOUS at 18:28

## 2020-10-14 RX ADMIN — Medication 500 MILLIGRAM(S): at 18:28

## 2020-10-14 NOTE — PROGRESS NOTE ADULT - ASSESSMENT
86 y/o male with acute hip fracture and Afib.     -Routine post surgical ortho care. pain controlled   -Staples to be removed POD 14  Ortho stable for DC, Outpatient follow up        UTI   UA ++ LE and nitrite but minimal wbc and epithelial cells. ucx neg. remains afebrile   afebrile    acute blood loss anemia. 2/2 surgery   - cbc stable   - trend cbc. no bloody stool      Afibb   -Echo reviewed  - Pt  agreed to AC so would recommend a NOAC. He reports only falling this one time. understands risk of falling on AC   - restart  xarelto 20 mg daily when able. will change to lovenox for now as he is npo     vomitng   -2/2 post op ileus vs gastric outlet syndrome  Had large bm  and passing gas but was vomiting even while NPO so NG was placed and filled 3L.  outputn remains high abdomen remains soft nt. ++BS.  GI consult appreciated f.u repeat KUB.     aspiration PNA   - ct shows lower lobe opacities and wbc stable and afebrile. levaquin as he is high risk for aspiration and chemical pneumonitis check procacitonin     renal mass   - incidentally found on ct  - needs follow up with urologist   - pt aware but will remind the pt prior to dc     dispo to kaitlyn when clinically stable

## 2020-10-14 NOTE — PROGRESS NOTE ADULT - SUBJECTIVE AND OBJECTIVE BOX
INTERVAL HPI/OVERNIGHT EVENTS:  Pt. seen and examined at bedside with Dr. De Leon. Patient denies abdominal pain, N/V. Frustrated and irritated with NGT. +flatus, no BM.   Denies fever/chills, chest pain, dyspnea, cough, dizziness.     Vital Signs Last 24 Hrs  T(C): 37 (14 Oct 2020 11:51), Max: 37.1 (13 Oct 2020 17:20)  T(F): 98.6 (14 Oct 2020 11:51), Max: 98.7 (13 Oct 2020 17:20)  HR: 110 (14 Oct 2020 11:51) (100 - 110)  BP: 113/73 (14 Oct 2020 11:51) (101/74 - 118/71)  RR: 17 (14 Oct 2020 11:51) (17 - 18)  SpO2: 97% (14 Oct 2020 11:51) (94% - 97%)    PHYSICAL EXAM:    GENERAL: NAD. NGT in place with 400cc output/24hr  CHEST/LUNG: Clear to ausculation, bilaterally   HEART: S1S2  ABDOMEN: non distended, +BS, soft, non tender, no guarding  EXTREMITIES:  calf soft, non tender b/l. 2+ distal pulses b/l.     I&O's Detail    13 Oct 2020 07:01  -  14 Oct 2020 07:00  --------------------------------------------------------  IN:    dextrose 5% + lactated ringers: 725 mL  Total IN: 725 mL    OUT:    Nasogastric/Oral tube (mL): 400 mL    Voided (mL): 500 mL  Total OUT: 900 mL    Total NET: -175 mL      14 Oct 2020 07:01  -  14 Oct 2020 13:43  --------------------------------------------------------  IN:  Total IN: 0 mL    OUT:    Oral Fluid: 0 mL  Total OUT: 0 mL    Total NET: 0 mL    LABS:                        9.7    7.80  )-----------( 273      ( 14 Oct 2020 07:07 )             30.4     10-14    139  |  99  |  26<H>  ----------------------------<  99  3.4<L>   |  33<H>  |  0.81    Ca    8.9      14 Oct 2020 07:07  Phos  3.2     10-13  Mg     2.3     10-13    85y Male admitted with  acute hip fracture s/p RIGHT hip IMN POD #7, now with GOO vs post-op ileus. NGT placed,  gastric distention improving s/p NGT decompression and Xray shows nonobstructive bowel gas pattern. +flatus.     - NGT clamp trial, f/u residual output in 4 hours for possible removal of tube today  - Continue GI follow up for possible EGD  - Continue medical management and supportive care  - No acute surgical intervention warranted at this time  - continue postop ortho care  Discussed with Dr. De Leon      INTERVAL HPI/OVERNIGHT EVENTS:  Pt. seen and examined at bedside with Dr. De Leon. Patient denies abdominal pain, N/V. Frustrated and irritated with NGT. +flatus, no BM.   Denies fever/chills, chest pain, dyspnea, cough, dizziness.     Vital Signs Last 24 Hrs  T(C): 37 (14 Oct 2020 11:51), Max: 37.1 (13 Oct 2020 17:20)  T(F): 98.6 (14 Oct 2020 11:51), Max: 98.7 (13 Oct 2020 17:20)  HR: 110 (14 Oct 2020 11:51) (100 - 110)  BP: 113/73 (14 Oct 2020 11:51) (101/74 - 118/71)  RR: 17 (14 Oct 2020 11:51) (17 - 18)  SpO2: 97% (14 Oct 2020 11:51) (94% - 97%)    PHYSICAL EXAM:    GENERAL: NAD. NGT in place with 400cc output/24hr  CHEST/LUNG: Clear to ausculation, bilaterally   HEART: S1S2  ABDOMEN: non distended, +BS, soft, non tender, no guarding  EXTREMITIES:  calf soft, non tender b/l. 2+ distal pulses b/l.     I&O's Detail    13 Oct 2020 07:01  -  14 Oct 2020 07:00  --------------------------------------------------------  IN:    dextrose 5% + lactated ringers: 725 mL  Total IN: 725 mL    OUT:    Nasogastric/Oral tube (mL): 400 mL    Voided (mL): 500 mL  Total OUT: 900 mL    Total NET: -175 mL      14 Oct 2020 07:01  -  14 Oct 2020 13:43  --------------------------------------------------------  IN:  Total IN: 0 mL    OUT:    Oral Fluid: 0 mL  Total OUT: 0 mL    Total NET: 0 mL    LABS:                        9.7    7.80  )-----------( 273      ( 14 Oct 2020 07:07 )             30.4     10-14    139  |  99  |  26<H>  ----------------------------<  99  3.4<L>   |  33<H>  |  0.81    Ca    8.9      14 Oct 2020 07:07  Phos  3.2     10-13  Mg     2.3     10-13    85y Male admitted with  acute hip fracture s/p RIGHT hip IMN POD #7, now with GOO vs post-op ileus. NGT placed,  gastric distention improving s/p NGT decompression and Xray shows nonobstructive bowel gas pattern. +flatus.     - NGT clamp trial, f/u residual output in 4 hours for possible removal of tube today  - Continue GI follow up for possible EGD - per discussion with Dr. Wilde, no plans to scope at this time, likely Ileus per GI  - Continue medical management and supportive care  - No acute surgical intervention warranted at this time  - continue postop ortho care  Discussed with Dr. De Leon

## 2020-10-14 NOTE — PROGRESS NOTE ADULT - SUBJECTIVE AND OBJECTIVE BOX
HPI:  85M otherwise healthy presents to the ED for right hip pain following a mechanical fall. Pt was sitting down in a chair when he missed the chair falling onto his right hip, he experienced immediate right hip pain and swelling. He denies hit to the head, LOC, any other orthopedic injuries. Pt denies SOB, CP, calf pain. (05 Oct 2020 16:02)    Patient is a 85y old  Male who presents with a chief complaint of right hip fx (14 Oct 2020 13:42)      INTERVAL HPI/OVERNIGHT EVENTS: no acute events still with high output from NGT     MEDICATIONS  (STANDING):  ascorbic acid 500 milliGRAM(s) Oral two times a day  dextrose 5% + lactated ringers. 1000 milliLiter(s) (125 mL/Hr) IV Continuous <Continuous>  dextrose 5% + lactated ringers. 1000 milliLiter(s) (75 mL/Hr) IV Continuous <Continuous>  enoxaparin Injectable 100 milliGRAM(s) SubCutaneous two times a day  folic acid 1 milliGRAM(s) Oral daily  levoFLOXacin IVPB      levoFLOXacin IVPB 500 milliGRAM(s) IV Intermittent every 24 hours  multivitamin 1 Tablet(s) Oral daily    MEDICATIONS  (PRN):  acetaminophen   Tablet .. 975 milliGRAM(s) Oral every 8 hours PRN Moderate Pain (4 - 6)  benzocaine 15 mG/menthol 3.6 mG (Sugar-Free) Lozenge 1 Lozenge Oral every 8 hours PRN Sore Throat  calcium carbonate    500 mG (Tums) Chewable 2 Tablet(s) Chew every 4 hours PRN Heartburn  guaiFENesin   Syrup  (Sugar-Free) 200 milliGRAM(s) Oral every 6 hours PRN Cough  melatonin 3 milliGRAM(s) Oral at bedtime PRN Sleep  ondansetron Injectable 4 milliGRAM(s) IV Push every 6 hours PRN Nausea and/or Vomiting  traMADol 25 milliGRAM(s) Oral every 6 hours PRN Moderate Pain (4 - 6)  traMADol 50 milliGRAM(s) Oral every 6 hours PRN Severe Pain (7 - 10)      Allergies    No Known Allergies    Intolerances        REVIEW OF SYSTEMS:  CONSTITUTIONAL:  fatigue  EYES: No eye pain, visual disturbances, or discharge  ENMT:  No difficulty hearing, tinnitus, vertigo; No sinus or throat pain  NECK: No pain or stiffness  BREASTS: No pain, masses, or nipple discharge  RESPIRATORY: No cough, wheezing, chills or hemoptysis; No shortness of breath  CARDIOVASCULAR: No chest pain, palpitations, dizziness, or leg swelling  GASTROINTESTINAL:NGT with high output flatus   GENITOURINARY: No dysuria, frequency, hematuria, or incontinence  NEUROLOGICAL: No headaches, memory loss, loss of strength, numbness, or tremors  SKIN: No itching, burning, rashes, or lesions   LYMPH NODES: No enlarged glands  ENDOCRINE: No heat or cold intolerance; No hair loss  MUSCULOSKELETAL: No joint pain or swelling; No muscle, back, or extremity pain  PSYCHIATRIC: No depression, anxiety, mood swings, or difficulty sleeping  HEME/LYMPH: No easy bruising, or bleeding gums  ALLERGY AND IMMUNOLOGIC: No hives or eczema    Vital Signs Last 24 Hrs  T(C): 36.6 (14 Oct 2020 17:30), Max: 37 (13 Oct 2020 23:59)  T(F): 97.9 (14 Oct 2020 17:30), Max: 98.6 (13 Oct 2020 23:59)  HR: 97 (14 Oct 2020 17:30) (97 - 110)  BP: 103/47 (14 Oct 2020 17:30) (101/74 - 113/73)  RR: 18 (14 Oct 2020 17:30) (17 - 18)  SpO2: 97% (14 Oct 2020 17:30) (94% - 97%)    PHYSICAL EXAM:  GENERAL: NAD, well-groomed, well-developed  HEAD:  Atraumatic, Normocephalic  EYES: EOMI, PERRLA, conjunctiva and sclera clear  ENMT: No tonsillar erythema, exudates, or enlargement; Moist mucous membranes, Good dentition, No lesions  NECK: Supple, No JVD, Normal thyroid  NERVOUS SYSTEM:  Alert & Oriented X3, Good concentration; Motor Strength 5/5 B/L upper and lower extremities; DTRs 2+ intact and symmetric  CHEST/LUNG: Clear to percussion bilaterally; No rales, rhonchi, wheezing, or rubs  HEART: Regular rate and rhythm; No murmurs, rubs, or gallops  ABDOMEN: Soft, Nontender, Nondistended; Bowel sounds present  EXTREMITIES:  surgical site dressed   LYMPH: No lymphadenopathy noted  SKIN: No rashes or lesions    LABS:                        9.7    7.80  )-----------( 273      ( 14 Oct 2020 07:07 )             30.4     10-14    139  |  99  |  26<H>  ----------------------------<  99  3.4<L>   |  33<H>  |  0.81    Ca    8.9      14 Oct 2020 07:07  Phos  3.2     10-13  Mg     2.3     10-13          CAPILLARY BLOOD GLUCOSE          RADIOLOGY & ADDITIONAL TESTS:    Imaging Personally Reviewed:  [X ] YES  [ ] NO    Consultant(s) Notes Reviewed:  [X ] YES  [ ] NO    Care Discussed with Consultants/Other Providers [X ] YES  [ ] NO

## 2020-10-14 NOTE — PROGRESS NOTE ADULT - SUBJECTIVE AND OBJECTIVE BOX
Patient is a 85y old  Male who presents with a chief complaint of right hip fx (13 Oct 2020 21:37)      HPI:  85M otherwise healthy presents to the ED for right hip pain following a mechanical fall. Pt was sitting down in a chair when he missed the chair falling onto his right hip, he experienced immediate right hip pain and swelling. He denies hit to the head, LOC, any other orthopedic injuries. Pt denies SOB, CP, calf pain. (05 Oct 2020 16:02)      INTERVAL HPI/OVERNIGHT EVENTS:  NGT output over night 100, residual after clamping .  No N/V. BM(-) Flatus (+)    MEDICATIONS  (STANDING):  ascorbic acid 500 milliGRAM(s) Oral two times a day  dextrose 5% + lactated ringers. 1000 milliLiter(s) (125 mL/Hr) IV Continuous <Continuous>  dextrose 5% + lactated ringers. 1000 milliLiter(s) (75 mL/Hr) IV Continuous <Continuous>  enoxaparin Injectable 100 milliGRAM(s) SubCutaneous two times a day  folic acid 1 milliGRAM(s) Oral daily  levoFLOXacin IVPB      levoFLOXacin IVPB 500 milliGRAM(s) IV Intermittent every 24 hours  multivitamin 1 Tablet(s) Oral daily    MEDICATIONS  (PRN):  acetaminophen   Tablet .. 975 milliGRAM(s) Oral every 8 hours PRN Moderate Pain (4 - 6)  benzocaine 15 mG/menthol 3.6 mG (Sugar-Free) Lozenge 1 Lozenge Oral every 8 hours PRN Sore Throat  calcium carbonate    500 mG (Tums) Chewable 2 Tablet(s) Chew every 4 hours PRN Heartburn  guaiFENesin   Syrup  (Sugar-Free) 200 milliGRAM(s) Oral every 6 hours PRN Cough  melatonin 3 milliGRAM(s) Oral at bedtime PRN Sleep  ondansetron Injectable 4 milliGRAM(s) IV Push every 6 hours PRN Nausea and/or Vomiting  traMADol 25 milliGRAM(s) Oral every 6 hours PRN Moderate Pain (4 - 6)  traMADol 50 milliGRAM(s) Oral every 6 hours PRN Severe Pain (7 - 10)      FAMILY HISTORY:      Allergies    No Known Allergies    Intolerances        PMH/PSH:  No pertinent past medical history    No significant past surgical history          REVIEW OF SYSTEMS:  CONSTITUTIONAL: No fever, weight loss, or fatigue  EYES: No eye pain, visual disturbances, or discharge  ENMT:  No difficulty hearing, tinnitus, vertigo; No sinus or throat pain  NECK: No pain or stiffness  BREASTS: No pain, masses, or nipple discharge  RESPIRATORY: No cough, wheezing, chills or hemoptysis; No shortness of breath  CARDIOVASCULAR: No chest pain, palpitations, dizziness, or leg swelling  GASTROINTESTINAL: See above  GENITOURINARY: No dysuria, frequency, hematuria, or incontinence  NEUROLOGICAL: No headaches, memory loss, loss of strength, numbness, or tremors  SKIN: No itching, burning, rashes, or lesions   LYMPH NODES: No enlarged glands  ENDOCRINE: No heat or cold intolerance; No hair loss  MUSCULOSKELETAL: No joint pain or swelling; No muscle, back, or extremity pain  PSYCHIATRIC: No depression, anxiety, mood swings, or difficulty sleeping  HEME/LYMPH: No easy bruising, or bleeding gums  ALLERGY AND IMMUNOLOGIC: No hives or eczema    Vital Signs Last 24 Hrs  T(C): 36.5 (14 Oct 2020 05:17), Max: 37.1 (13 Oct 2020 17:20)  T(F): 97.7 (14 Oct 2020 05:17), Max: 98.7 (13 Oct 2020 17:20)  HR: 100 (14 Oct 2020 05:17) (69 - 107)  BP: 101/74 (14 Oct 2020 05:17) (101/74 - 118/71)  BP(mean): --  RR: 18 (14 Oct 2020 05:17) (17 - 18)  SpO2: 95% (14 Oct 2020 05:17) (94% - 97%)    PHYSICAL EXAM:  GENERAL: NAD, well-groomed, well-developed  HEAD:  Atraumatic, Normocephalic  EYES: EOMI, PERRLA, conjunctiva and sclera clear  NECK: Supple, No JVD, Normal thyroid  NERVOUS SYSTEM:  Alert & Oriented X3, Good concentration; Motor Strength 5/5 B/L upper and lower extremities;   CHEST/LUNG: Clear to percussion bilaterally; No rales, rhonchi, wheezing, or rubs  HEART: Regular rate and rhythm; No murmurs, rubs, or gallops  ABDOMEN: Soft, Nontender, Nondistended; Bowel sounds present  EXTREMITIES:  2+ Peripheral Pulses, No clubbing, cyanosis, or edema  LYMPH: No lymphadenopathy noted  SKIN: No rashes or lesions    LAB                          9.7    7.80  )-----------( 273      ( 14 Oct 2020 07:07 )             30.4       CBC:  10-14 @ 07:07  WBC 7.80   Hgb 9.7   Hct 30.4   Plts 273  MCV 98.7  10-13 @ 06:28  WBC 9.31   Hgb 8.7   Hct 27.2   Plts 255  MCV 98.2  10-12 @ 06:37  WBC 13.31   Hgb 9.8   Hct 29.5   Plts 211  MCV 97.0  10-11 @ 09:06  WBC 10.34   Hgb 10.0   Hct 30.3   Plts 256  MCV 97.7  10-09 @ 06:43  WBC 9.08   Hgb 8.9   Hct 28.0   Plts 163  MCV 97.9  10-08 @ 06:37  WBC 9.50   Hgb 9.3   Hct 28.0   Plts 141  MCV 95.2      Chemistry:  10-14 @ 07:07  Na+ 139  K+ 3.4  Cl- 99  CO2 33  BUN 26  Cr 0.81     10-13 @ 06:28  Na+ 138  K+ 3.3  Cl- 100  CO2 32  BUN 28  Cr 0.76     10-12 @ 06:37  Na+ 136  K+ 4.2  Cl- 97  CO2 33  BUN 37  Cr 1.01     10-11 @ 09:06  Na+ 139  K+ 4.4  Cl- 100  CO2 32  BUN 35  Cr 1.11     10-09 @ 06:43  Na+ 137  K+ 4.7  Cl- 103  CO2 31  BUN 33  Cr 0.97     10-08 @ 06:37  Na+ 135  K+ 4.4  Cl- 103  CO2 27  BUN 40  Cr 1.23         Glucose, Serum: 99 mg/dL (10-14 @ 07:07)  Glucose, Serum: 92 mg/dL (10-13 @ 06:28)  Glucose, Serum: 131 mg/dL (10-12 @ 06:37)  Glucose, Serum: 152 mg/dL (10-11 @ 09:06)  Glucose, Serum: 123 mg/dL (10-09 @ 06:43)  Glucose, Serum: 112 mg/dL (10-08 @ 06:37)      14 Oct 2020 07:07    139    |  99     |  26     ----------------------------<  99     3.4     |  33     |  0.81   13 Oct 2020 06:28    138    |  100    |  28     ----------------------------<  92     3.3     |  32     |  0.76   12 Oct 2020 06:37    136    |  97     |  37     ----------------------------<  131    4.2     |  33     |  1.01   11 Oct 2020 09:06    139    |  100    |  35     ----------------------------<  152    4.4     |  32     |  1.11   09 Oct 2020 06:43    137    |  103    |  33     ----------------------------<  123    4.7     |  31     |  0.97   08 Oct 2020 06:37    135    |  103    |  40     ----------------------------<  112    4.4     |  27     |  1.23     Ca    8.9        14 Oct 2020 07:07  Ca    8.7        13 Oct 2020 06:28  Ca    8.7        12 Oct 2020 06:37  Ca    8.9        11 Oct 2020 09:06  Ca    8.6        09 Oct 2020 06:43  Ca    8.6        08 Oct 2020 06:37  Phos  3.2       13 Oct 2020 06:28  Phos  3.2       11 Oct 2020 09:06  Mg     2.3       13 Oct 2020 06:28  Mg     3.0       11 Oct 2020 09:06                CAPILLARY BLOOD GLUCOSE              RADIOLOGY & ADDITIONAL TESTS:    Imaging Personally Reviewed:  [ ] YES  [ ] NO    Consultant(s) Notes Reviewed:  [ ] YES  [ ] NO    Care Discussed with Consultants/Other Providers [ ] YES  [ ] NO

## 2020-10-14 NOTE — PROGRESS NOTE ADULT - ATTENDING COMMENTS
Discussed with ortho resident, will start DOAC. Followup up in my office within 1-2 weeks or after rehab.
Ok  to proceed with clamp trial/removal of NGT from surgical perspective.
agree with above note and plan.
plan for OR today

## 2020-10-14 NOTE — PROGRESS NOTE ADULT - ASSESSMENT
HPI:  85M otherwise healthy presents to the ED for right hip pain following a mechanical fall. Pt was sitting down in a chair when he missed the chair falling onto his right hip, he experienced immediate right hip pain and swelling. He denies hit to the head, LOC, any other orthopedic injuries. Pt denies SOB, CP, calf pain. (05 Oct 2020 16:02)  ------------ As Above -------------  The patient presented with a mechanical fall and fractured his hip. S/P repair 10/6. Patient developed N/V on 10/10. NGT inserted this AM revealed 3 liters of fluid. CT Scan done afterwards revealed gastric distention > ileus.   Patient felt much better with NGT. No history of PUD. No GI symptoms on admission. On ASA 81 QD.  Prior to admission, the patient denies melena, hematochezia, hematemesis, nausea, vomiting, abdominal pain, constipation, diarrhea, or change in bowel movements Normal colonoscopy 2017.   Flatus but no BMs. Enema without any results.    Goo VS ileus-  KUB yesterday shows improvement. Today' s pending. Flatus (+), BM (-) . 1) Continue as per surgery  2) NPO  3) check  KUB 4) TSH 5) f/u KUB 6) check electrolytes

## 2020-10-15 LAB
ANION GAP SERPL CALC-SCNC: 7 MMOL/L — SIGNIFICANT CHANGE UP (ref 5–17)
BUN SERPL-MCNC: 22 MG/DL — SIGNIFICANT CHANGE UP (ref 7–23)
CALCIUM SERPL-MCNC: 8.5 MG/DL — SIGNIFICANT CHANGE UP (ref 8.5–10.1)
CHLORIDE SERPL-SCNC: 101 MMOL/L — SIGNIFICANT CHANGE UP (ref 96–108)
CO2 SERPL-SCNC: 30 MMOL/L — SIGNIFICANT CHANGE UP (ref 22–31)
CREAT SERPL-MCNC: 0.86 MG/DL — SIGNIFICANT CHANGE UP (ref 0.5–1.3)
GLUCOSE SERPL-MCNC: 94 MG/DL — SIGNIFICANT CHANGE UP (ref 70–99)
HCT VFR BLD CALC: 31.6 % — LOW (ref 39–50)
HGB BLD-MCNC: 10.1 G/DL — LOW (ref 13–17)
MAGNESIUM SERPL-MCNC: 2.1 MG/DL — SIGNIFICANT CHANGE UP (ref 1.6–2.6)
MCHC RBC-ENTMCNC: 31.7 PG — SIGNIFICANT CHANGE UP (ref 27–34)
MCHC RBC-ENTMCNC: 32 GM/DL — SIGNIFICANT CHANGE UP (ref 32–36)
MCV RBC AUTO: 99.1 FL — SIGNIFICANT CHANGE UP (ref 80–100)
NRBC # BLD: 0 /100 WBCS — SIGNIFICANT CHANGE UP (ref 0–0)
PHOSPHATE SERPL-MCNC: 2.9 MG/DL — SIGNIFICANT CHANGE UP (ref 2.5–4.5)
PLATELET # BLD AUTO: 257 K/UL — SIGNIFICANT CHANGE UP (ref 150–400)
POTASSIUM SERPL-MCNC: 3.2 MMOL/L — LOW (ref 3.5–5.3)
POTASSIUM SERPL-SCNC: 3.2 MMOL/L — LOW (ref 3.5–5.3)
PROCALCITONIN SERPL-MCNC: 0.1 NG/ML — SIGNIFICANT CHANGE UP (ref 0.02–0.1)
RBC # BLD: 3.19 M/UL — LOW (ref 4.2–5.8)
RBC # FLD: 15.3 % — HIGH (ref 10.3–14.5)
SODIUM SERPL-SCNC: 138 MMOL/L — SIGNIFICANT CHANGE UP (ref 135–145)
WBC # BLD: 8.41 K/UL — SIGNIFICANT CHANGE UP (ref 3.8–10.5)
WBC # FLD AUTO: 8.41 K/UL — SIGNIFICANT CHANGE UP (ref 3.8–10.5)

## 2020-10-15 PROCEDURE — 99233 SBSQ HOSP IP/OBS HIGH 50: CPT

## 2020-10-15 RX ADMIN — ENOXAPARIN SODIUM 100 MILLIGRAM(S): 100 INJECTION SUBCUTANEOUS at 17:50

## 2020-10-15 RX ADMIN — Medication 1 MILLIGRAM(S): at 12:28

## 2020-10-15 RX ADMIN — Medication 1 TABLET(S): at 12:28

## 2020-10-15 RX ADMIN — Medication 500 MILLIGRAM(S): at 05:29

## 2020-10-15 RX ADMIN — Medication 500 MILLIGRAM(S): at 17:50

## 2020-10-15 RX ADMIN — ENOXAPARIN SODIUM 100 MILLIGRAM(S): 100 INJECTION SUBCUTANEOUS at 05:29

## 2020-10-15 NOTE — PROGRESS NOTE ADULT - ASSESSMENT
HPI:  85M otherwise healthy presents to the ED for right hip pain following a mechanical fall. Pt was sitting down in a chair when he missed the chair falling onto his right hip, he experienced immediate right hip pain and swelling. He denies hit to the head, LOC, any other orthopedic injuries. Pt denies SOB, CP, calf pain. (05 Oct 2020 16:02)  ------------ As Above -------------  The patient presented with a mechanical fall and fractured his hip. S/P repair 10/6. Patient developed N/V on 10/10. NGT inserted this AM revealed 3 liters of fluid. CT Scan done afterwards revealed gastric distention > ileus.   Patient felt much better with NGT. No history of PUD. No GI symptoms on admission. On ASA 81 QD.  Prior to admission, the patient denies melena, hematochezia, hematemesis, nausea, vomiting, abdominal pain, constipation, diarrhea, or change in bowel movements Normal colonoscopy 2017.   Flatus but no BMs. Enema without any results.    Goo VS ileus-  KUB yesterday shows improvement.  Flatus (+), BM (-) . 1)trial of h20 via NGT  2) NPO  3) f/u  KUB 4)  check electrolytes

## 2020-10-15 NOTE — PROGRESS NOTE ADULT - SUBJECTIVE AND OBJECTIVE BOX
HPI:  85M otherwise healthy presents to the ED for right hip pain following a mechanical fall. Pt was sitting down in a chair when he missed the chair falling onto his right hip, he experienced immediate right hip pain and swelling. He denies hit to the head, LOC, any other orthopedic injuries. Pt denies SOB, CP, calf pain. (05 Oct 2020 16:02)    Patient is a 85y old  Male who presents with a chief complaint of right hip fx (15 Oct 2020 12:19)      INTERVAL HPI/OVERNIGHT EVENTS: no acute events still with high output     MEDICATIONS  (STANDING):  ascorbic acid 500 milliGRAM(s) Oral two times a day  dextrose 5% + lactated ringers. 1000 milliLiter(s) (125 mL/Hr) IV Continuous <Continuous>  dextrose 5% + lactated ringers. 1000 milliLiter(s) (75 mL/Hr) IV Continuous <Continuous>  enoxaparin Injectable 100 milliGRAM(s) SubCutaneous two times a day  folic acid 1 milliGRAM(s) Oral daily  multivitamin 1 Tablet(s) Oral daily    MEDICATIONS  (PRN):  acetaminophen   Tablet .. 975 milliGRAM(s) Oral every 8 hours PRN Moderate Pain (4 - 6)  benzocaine 15 mG/menthol 3.6 mG (Sugar-Free) Lozenge 1 Lozenge Oral every 8 hours PRN Sore Throat  calcium carbonate    500 mG (Tums) Chewable 2 Tablet(s) Chew every 4 hours PRN Heartburn  guaiFENesin   Syrup  (Sugar-Free) 200 milliGRAM(s) Oral every 6 hours PRN Cough  melatonin 3 milliGRAM(s) Oral at bedtime PRN Sleep  ondansetron Injectable 4 milliGRAM(s) IV Push every 6 hours PRN Nausea and/or Vomiting  traMADol 25 milliGRAM(s) Oral every 6 hours PRN Moderate Pain (4 - 6)  traMADol 50 milliGRAM(s) Oral every 6 hours PRN Severe Pain (7 - 10)      Allergies    No Known Allergies    Intolerances        REVIEW OF SYSTEMS:  CONSTITUTIONAL: No fever, weight loss, or fatigue  EYES: No eye pain, visual disturbances, or discharge  ENMT:  No difficulty hearing, tinnitus, vertigo; No sinus or throat pain  NECK: No pain or stiffness  BREASTS: No pain, masses, or nipple discharge  RESPIRATORY: No cough, wheezing, chills or hemoptysis; No shortness of breath  CARDIOVASCULAR: No chest pain, palpitations, dizziness, or leg swelling  GASTROINTESTINAL: flatus   NEUROLOGICAL: No headaches, memory loss, loss of strength, numbness, or tremors  SKIN: No itching, burning, rashes, or lesions   LYMPH NODES: No enlarged glands  ENDOCRINE: No heat or cold intolerance; No hair loss  MUSCULOSKELETAL: No joint pain or swelling; No muscle, back, or extremity pain  PSYCHIATRIC: No depression, anxiety, mood swings, or difficulty sleeping  HEME/LYMPH: No easy bruising, or bleeding gums  ALLERGY AND IMMUNOLOGIC: No hives or eczema    Vital Signs Last 24 Hrs  T(C): 36.8 (15 Oct 2020 13:00), Max: 37.2 (14 Oct 2020 23:22)  T(F): 98.2 (15 Oct 2020 13:00), Max: 98.9 (14 Oct 2020 23:22)  HR: 94 (15 Oct 2020 13:00) (94 - 108)  BP: 100/68 (15 Oct 2020 13:00) (100/68 - 125/63)  BP(mean): --  RR: 18 (15 Oct 2020 13:00) (18 - 18)  SpO2: 96% (15 Oct 2020 13:00) (96% - 98%)    PHYSICAL EXAM:  GENERAL: NAD, well-groomed, well-developed  HEAD:  Atraumatic, Normocephalic  EYES: EOMI, PERRLA, conjunctiva and sclera clear  ENMT: ngt   NECK: Supple, No JVD, Normal thyroid  NERVOUS SYSTEM:  Alert & Oriented X3, Good concentration; Motor Strength 5/5 B/L upper and lower extremities; DTRs 2+ intact and symmetric  CHEST/LUNG: Clear bilaterally; No rales, rhonchi, wheezing, or rubs  HEART: Regular rate and rhythm; No murmurs, rubs, or gallops  ABDOMEN: Soft, Nontender, Nondistended; Bowel sounds present  EXTREMITIES:  2+ Peripheral Pulses, No clubbing, cyanosis, or edema  LYMPH: No lymphadenopathy noted  SKIN: No rashes or lesions    LABS:                        10.1   8.41  )-----------( 257      ( 15 Oct 2020 06:36 )             31.6     10-15    138  |  101  |  22  ----------------------------<  94  3.2<L>   |  30  |  0.86    Ca    8.5      15 Oct 2020 06:36  Phos  2.9     10-15  Mg     2.1     10-15          CAPILLARY BLOOD GLUCOSE          RADIOLOGY & ADDITIONAL TESTS:    Imaging Personally Reviewed:  [X ] YES  [ ] NO    Consultant(s) Notes Reviewed:  [X ] YES  [ ] NO    Care Discussed with Consultants/Other Providers [X ] YES  [ ] NO

## 2020-10-15 NOTE — PROGRESS NOTE ADULT - SUBJECTIVE AND OBJECTIVE BOX
Patient seen and examined at bedside with Dr. De Leon.  No acute complaints offered.   The patient reports feeling hungry. He has been eating ice chips.  Denies nausea, vomiting, and abdominal pain.     T(F): 98 (10-15-20 @ 05:45), Max: 98.9 (10-14-20 @ 23:22)  HR: 100 (10-15-20 @ 05:45) (97 - 102)  BP: 125/63 (10-15-20 @ 05:45) (103/47 - 125/63)  RR: 18 (10-15-20 @ 05:45) (18 - 18)  SpO2: 98% (10-15-20 @ 05:45) (97% - 98%)    PHYSICAL EXAM:  General: NAD, alert and responsive   HEENT: NGT indwelling, connected to LWS. Gastric contents in cannister. Output 2L/24hrs, 500cc overnight.  Lung: respirations nonlabored  Abdomen: Normoactive BS, soft, nontender  Extremities: no pedal edema, no calf tenderness     LABS:                        10.1   8.41  )-----------( 257      ( 15 Oct 2020 06:36 )             31.6     10-15    138  |  101  |  22  ----------------------------<  94  3.2<L>   |  30  |  0.86    Ca    8.5      15 Oct 2020 06:36  Phos  2.9     10-15  Mg     2.1     10-15    Assessment: 85y Male admitted with  acute hip fracture s/p RIGHT hip IMN, seen with postop ileus rule out GOO  -Recommend repeat NGT clamp trial per Dr. De Leon. GI Follow up noted  -Continue NPO diet and maintenance IVF; medical management and supportive care. Discussed with Dr Pires  -No acute surgical intervention warranted at this time.  -Continue postop ortho care           Patient seen and examined at bedside with Dr. De Leon.  No acute complaints offered.   The patient reports feeling hungry. He has been eating ice chips.  Denies nausea, vomiting, and abdominal pain.     T(F): 98 (10-15-20 @ 05:45), Max: 98.9 (10-14-20 @ 23:22)  HR: 100 (10-15-20 @ 05:45) (97 - 102)  BP: 125/63 (10-15-20 @ 05:45) (103/47 - 125/63)  RR: 18 (10-15-20 @ 05:45) (18 - 18)  SpO2: 98% (10-15-20 @ 05:45) (97% - 98%)    PHYSICAL EXAM:  General: NAD, alert and responsive   HEENT: NGT indwelling, connected to LWS. Gastric contents in cannister. Output 2L/24hrs, 500cc overnight.  Lung: respirations nonlabored  Abdomen: Normoactive BS, soft, nontender  Extremities: no pedal edema, no calf tenderness     LABS:                        10.1   8.41  )-----------( 257      ( 15 Oct 2020 06:36 )             31.6     10-15    138  |  101  |  22  ----------------------------<  94  3.2<L>   |  30  |  0.86    Ca    8.5      15 Oct 2020 06:36  Phos  2.9     10-15  Mg     2.1     10-15    Assessment: 85y Male admitted with  acute hip fracture s/p RIGHT hip IMN, seen with postop ileus rule out GOO  Hypokalemia  -Recommend repeat NGT clamp trial per Dr. De Leon. GI Follow up noted  -Continue NPO diet and maintenance IVF; medical management and supportive care. Discussed with Dr Pires.  -replete electrolytes as needed  -No acute surgical intervention warranted at this time.  -Continue postop ortho care

## 2020-10-15 NOTE — CHART NOTE - NSCHARTNOTEFT_GEN_A_CORE
Assessment: Pt seen for & remains with malnutrition. Pt admitted with hip fracture s/p intramedullary nailing of femur (10-06). Per chart review pt c N/V (10/10), NPO & NGT placed suction. Pt continues c light brown fluid output. Pt denied any GI pain. Pt expressed desire to advance diet. Discussed w pt advancement of diet as deemed medically feasible by MD. Pt verbally understood.     Factors impacting intake: [ ] none [ ] nausea  [ ] vomiting [ ] diarrhea [ ] constipation  [ ]chewing problems [ ] swallowing issues  [x ] other: NGT clamp trial, on suction     Diet Prescription: Diet, NPO:   Except Medications  With Ice Chips/Sips of Water (10-11-20 @ 09:49)    Intake: NPO x 5     Current Weight: 100.3 kg (10-)  ;   99.3 kg (10-)   % Weight Change: 1% gain (? fluid accumulation)     Physical appearance: BMI 27.3 ; R hip and R leg 2+ edema noted     Pertinent Medications: MEDICATIONS  (STANDING):  ascorbic acid 500 milliGRAM(s) Oral two times a day  dextrose 5% + lactated ringers. 1000 milliLiter(s) (125 mL/Hr) IV Continuous <Continuous>  dextrose 5% + lactated ringers. 1000 milliLiter(s) (75 mL/Hr) IV Continuous <Continuous>  enoxaparin Injectable 100 milliGRAM(s) SubCutaneous two times a day  folic acid 1 milliGRAM(s) Oral daily  levoFLOXacin IVPB      levoFLOXacin IVPB 500 milliGRAM(s) IV Intermittent every 24 hours  multivitamin 1 Tablet(s) Oral daily    MEDICATIONS  (PRN):  acetaminophen   Tablet .. 975 milliGRAM(s) Oral every 8 hours PRN Moderate Pain (4 - 6)  benzocaine 15 mG/menthol 3.6 mG (Sugar-Free) Lozenge 1 Lozenge Oral every 8 hours PRN Sore Throat  calcium carbonate    500 mG (Tums) Chewable 2 Tablet(s) Chew every 4 hours PRN Heartburn  guaiFENesin   Syrup  (Sugar-Free) 200 milliGRAM(s) Oral every 6 hours PRN Cough  melatonin 3 milliGRAM(s) Oral at bedtime PRN Sleep  ondansetron Injectable 4 milliGRAM(s) IV Push every 6 hours PRN Nausea and/or Vomiting  traMADol 25 milliGRAM(s) Oral every 6 hours PRN Moderate Pain (4 - 6)  traMADol 50 milliGRAM(s) Oral every 6 hours PRN Severe Pain (7 - 10)    Pertinent Labs: 10-15 Na n/a   Glu n/a   K+ n/a   Cr n/a   BUN n/a   Phos 2.9 mg/dL Alb n/a   PAB n/a   Hgb n/a   Hct n/a   HgA1C n/a    Glucose, Serum: 94 mg/dL   24Hr FS:  Skin: hip surgical incision    Estimated Needs:   [ x] no change since previous assessment (10-)   [ ] recalculated:     Previous Nutrition Diagnosis:   Nutrition Diagnostic Terminology #1 Malnutrition...   Malnutrition Severe malnutrition in the context of chronic illness.   Etiology inadequate energy, protein intake in the presence of cardiac hx and self-imposed, excessive, unhealthy wt loss.   Signs/Symptoms physical findings of severe fat & muscle wasting noted.     Goal/Expected Outcome Pt will consume >75% nut'l needs via meals & supplement once diet is advanced, maintain wt +/- 5#. ( not met)     Nutrition Diagnosis is [x ] ongoing  [ ] resolved  [ ] improved  [ ] not applicable   Previous Goal:     New Nutrition Diagnosis: [x ] not applicable       Interventions:   Recommend  [ ] Continue:  [ x] Change Diet To: Once deemed medically feasible advance diet- Clears----> Full liquids----> Regular diet c Ensure Enlive x 2/day (provides 700 kcal, 40 g protein)   [ ] Nutrition Supplement:  [ ] Nutrition Support:  [ ] Other:     Monitoring and Evaluation:   [ ] PO intake [ x ] Tolerance to diet prescription [ x ] weights [ x ] labs[ x ] follow up per protocol  [ ] other:

## 2020-10-15 NOTE — PROGRESS NOTE ADULT - SUBJECTIVE AND OBJECTIVE BOX
Patient is a 85y old  Male who presents with a chief complaint of right hip fx (14 Oct 2020 19:54)      HPI:  85M otherwise healthy presents to the ED for right hip pain following a mechanical fall. Pt was sitting down in a chair when he missed the chair falling onto his right hip, he experienced immediate right hip pain and swelling. He denies hit to the head, LOC, any other orthopedic injuries. Pt denies SOB, CP, calf pain. (05 Oct 2020 16:02)      INTERVAL HPI/OVERNIGHT EVENTS:  NGT aspirate 300 / after removal of clamp. 1100 / over night. PO intake unknown Patient drinking cup of ice. Flatus (+) BM(-)     MEDICATIONS  (STANDING):  ascorbic acid 500 milliGRAM(s) Oral two times a day  dextrose 5% + lactated ringers. 1000 milliLiter(s) (125 mL/Hr) IV Continuous <Continuous>  dextrose 5% + lactated ringers. 1000 milliLiter(s) (75 mL/Hr) IV Continuous <Continuous>  enoxaparin Injectable 100 milliGRAM(s) SubCutaneous two times a day  folic acid 1 milliGRAM(s) Oral daily  levoFLOXacin IVPB      levoFLOXacin IVPB 500 milliGRAM(s) IV Intermittent every 24 hours  multivitamin 1 Tablet(s) Oral daily    MEDICATIONS  (PRN):  acetaminophen   Tablet .. 975 milliGRAM(s) Oral every 8 hours PRN Moderate Pain (4 - 6)  benzocaine 15 mG/menthol 3.6 mG (Sugar-Free) Lozenge 1 Lozenge Oral every 8 hours PRN Sore Throat  calcium carbonate    500 mG (Tums) Chewable 2 Tablet(s) Chew every 4 hours PRN Heartburn  guaiFENesin   Syrup  (Sugar-Free) 200 milliGRAM(s) Oral every 6 hours PRN Cough  melatonin 3 milliGRAM(s) Oral at bedtime PRN Sleep  ondansetron Injectable 4 milliGRAM(s) IV Push every 6 hours PRN Nausea and/or Vomiting  traMADol 25 milliGRAM(s) Oral every 6 hours PRN Moderate Pain (4 - 6)  traMADol 50 milliGRAM(s) Oral every 6 hours PRN Severe Pain (7 - 10)      FAMILY HISTORY:      Allergies    No Known Allergies    Intolerances        PMH/PSH:  No pertinent past medical history    No significant past surgical history          REVIEW OF SYSTEMS:  CONSTITUTIONAL: No fever, weight loss,  EYES: No eye pain, visual disturbances, or discharge  ENMT:  No difficulty hearing, tinnitus, vertigo; No sinus or throat pain  NECK: No pain or stiffness  BREASTS: No pain, masses, or nipple discharge  RESPIRATORY: No cough, wheezing, chills or hemoptysis; No shortness of breath  CARDIOVASCULAR: No chest pain, palpitations, dizziness, or leg swelling  GASTROINTESTINAL: See above   GENITOURINARY: No dysuria, frequency, hematuria, or incontinence  NEUROLOGICAL: No headaches, memory loss, loss of strength, numbness, or tremors  SKIN: No itching, burning, rashes, or lesions   LYMPH NODES: No enlarged glands  ENDOCRINE: No heat or cold intolerance; No hair loss  MUSCULOSKELETAL: No joint pain or swelling; No muscle, back, or extremity pain  PSYCHIATRIC: No depression, anxiety, mood swings, or difficulty sleeping  HEME/LYMPH: No easy bruising, or bleeding gums  ALLERGY AND IMMUNOLOGIC: No hives or eczema    Vital Signs Last 24 Hrs  T(C): 36.7 (15 Oct 2020 05:45), Max: 37.2 (14 Oct 2020 23:22)  T(F): 98 (15 Oct 2020 05:45), Max: 98.9 (14 Oct 2020 23:22)  HR: 100 (15 Oct 2020 05:45) (97 - 102)  BP: 125/63 (15 Oct 2020 05:45) (103/47 - 125/63)  BP(mean): --  RR: 18 (15 Oct 2020 05:45) (18 - 18)  SpO2: 98% (15 Oct 2020 05:45) (97% - 98%)    PHYSICAL EXAM:  GENERAL: NAD, well-groomed, well-developed  HEAD:  Atraumatic, Normocephalic  EYES: EOMI, PERRLA, conjunctiva and sclera clear  NECK: Supple, No JVD, Normal thyroid  NERVOUS SYSTEM:  Alert & Oriented X3, Good concentration; Motor Strength 5/5 B/L upper and lower extremities;  CHEST/LUNG: Clear to percussion bilaterally; No rales, rhonchi, wheezing, or rubs  HEART: Regular rate and rhythm; No murmurs, rubs, or gallops  ABDOMEN: Soft, Nontender, Nondistended; Bowel sounds present  EXTREMITIES:  2+ Peripheral Pulses, No clubbing, cyanosis, or edema  LYMPH: No lymphadenopathy noted  SKIN: No rashes or lesions    LAB                          10.1   8.41  )-----------( 257      ( 15 Oct 2020 06:36 )             31.6       CBC:  10-15 @ 06:36  WBC 8.41   Hgb 10.1   Hct 31.6   Plts 257  MCV 99.1  10-14 @ 07:07  WBC 7.80   Hgb 9.7   Hct 30.4   Plts 273  MCV 98.7  10-13 @ 06:28  WBC 9.31   Hgb 8.7   Hct 27.2   Plts 255  MCV 98.2  10-12 @ 06:37  WBC 13.31   Hgb 9.8   Hct 29.5   Plts 211  MCV 97.0  10-11 @ 09:06  WBC 10.34   Hgb 10.0   Hct 30.3   Plts 256  MCV 97.7  10-09 @ 06:43  WBC 9.08   Hgb 8.9   Hct 28.0   Plts 163  MCV 97.9      Chemistry:  10-15 @ 06:36  Na+ 138  K+ 3.2  Cl- 101  CO2 30  BUN 22  Cr 0.86     10-14 @ 07:07  Na+ 139  K+ 3.4  Cl- 99  CO2 33  BUN 26  Cr 0.81     10-13 @ 06:28  Na+ 138  K+ 3.3  Cl- 100  CO2 32  BUN 28  Cr 0.76     10-12 @ 06:37  Na+ 136  K+ 4.2  Cl- 97  CO2 33  BUN 37  Cr 1.01     10-11 @ 09:06  Na+ 139  K+ 4.4  Cl- 100  CO2 32  BUN 35  Cr 1.11     10-09 @ 06:43  Na+ 137  K+ 4.7  Cl- 103  CO2 31  BUN 33  Cr 0.97         Glucose, Serum: 94 mg/dL (10-15 @ 06:36)  Glucose, Serum: 99 mg/dL (10-14 @ 07:07)  Glucose, Serum: 92 mg/dL (10-13 @ 06:28)  Glucose, Serum: 131 mg/dL (10-12 @ 06:37)  Glucose, Serum: 152 mg/dL (10-11 @ 09:06)  Glucose, Serum: 123 mg/dL (10-09 @ 06:43)      15 Oct 2020 06:36    138    |  101    |  22     ----------------------------<  94     3.2     |  30     |  0.86   14 Oct 2020 07:07    139    |  99     |  26     ----------------------------<  99     3.4     |  33     |  0.81   13 Oct 2020 06:28    138    |  100    |  28     ----------------------------<  92     3.3     |  32     |  0.76   12 Oct 2020 06:37    136    |  97     |  37     ----------------------------<  131    4.2     |  33     |  1.01   11 Oct 2020 09:06    139    |  100    |  35     ----------------------------<  152    4.4     |  32     |  1.11   09 Oct 2020 06:43    137    |  103    |  33     ----------------------------<  123    4.7     |  31     |  0.97     Ca    8.5        15 Oct 2020 06:36  Ca    8.9        14 Oct 2020 07:07  Ca    8.7        13 Oct 2020 06:28  Ca    8.7        12 Oct 2020 06:37  Ca    8.9        11 Oct 2020 09:06  Ca    8.6        09 Oct 2020 06:43  Phos  2.9       15 Oct 2020 06:38  Phos  3.2       13 Oct 2020 06:28  Phos  3.2       11 Oct 2020 09:06  Mg     2.1       15 Oct 2020 06:38  Mg     2.3       13 Oct 2020 06:28  Mg     3.0       11 Oct 2020 09:06                CAPILLARY BLOOD GLUCOSE              RADIOLOGY & ADDITIONAL TESTS:      < from: Xray Kidney Ureter Bladder (10.14.20 @ 10:26) >    EXAM:  XR KUB 1 VIEW                            PROCEDURE DATE:  10/14/2020          INTERPRETATION:  DATE OF EXAM: 10/14/20     COMPARISON: 10/13/20    CLINICAL INDICATION: Assess for ileus.    TECHNIQUE: 2 KUBs.    FINDINGS:  NG tube tip in stomach - stable mild gastric gaseousness.  Otherwise nonspecific, nonobstructive gas pattern.  Stool and gas in a decompressed colon.  No gross free intraperitoneal air.  IV iodine contrast in the bladder from prior CT scan.  Status post right femur ORIF.  No acute bony finding.    IMPRESSION:  1. NG tube in stomach.  2. Nonspecific, nonobstructive intestinal gas pattern.            KATRINA HARDEN M.D., ATTENDING RADIOLOGIST  This document has been electronically signed. Oct 14 2020 11:57AM    < end of copied text >  Imaging Personally Reviewed:  [ ] YES  [ ] NO    Consultant(s) Notes Reviewed:  [ ] YES  [ ] NO    Care Discussed with Consultants/Other Providers [ ] YES  [ ] NO

## 2020-10-15 NOTE — PROGRESS NOTE ADULT - ASSESSMENT
86 y/o male with acute hip fracture and Afib.     -Routine post surgical ortho care. pain controlled   -Staples to be removed POD 14  Ortho stable for DC, Outpatient follow up    -hypokalemia 9replacement     UTI   UA ++ LE and nitrite but minimal wbc and epithelial cells. ucx neg. remains afebrile   afebriles/p three day course of levaquin     acute blood loss anemia. 2/2 surgery   - cbc stable   - trend cbc. no bloody stool      Afib  -Echo reviewed  - Pt  agreed to AC so would recommend a NOAC. He reports only falling this one time. understands risk of falling on AC   - restart  xarelto 20 mg daily when able. will change to lovenox for now as he is npo     vomitng   -2/2 post op ileus vs gastric outlet syndrome  Had large bm  and passing gas but was vomiting even while NPO so NG was placed and filled 3L.  outputn remains high abdomen remains soft nt. ++BS.  GI consult appreciated KUB still with ilius?  aspiration PNA   - ct shows lower lobe opacities and wbc stable and afebrile. procalcitonin normal will dc levaquin   renal mass   - incidentally found on ct  - needs follow up with urologist   - pt aware but will remind the pt prior to dc     dispo to kaitlyn when clinically stable

## 2020-10-16 LAB
ANION GAP SERPL CALC-SCNC: 3 MMOL/L — LOW (ref 5–17)
BUN SERPL-MCNC: 25 MG/DL — HIGH (ref 7–23)
CALCIUM SERPL-MCNC: 8.8 MG/DL — SIGNIFICANT CHANGE UP (ref 8.5–10.1)
CHLORIDE SERPL-SCNC: 104 MMOL/L — SIGNIFICANT CHANGE UP (ref 96–108)
CO2 SERPL-SCNC: 32 MMOL/L — HIGH (ref 22–31)
CREAT SERPL-MCNC: 0.86 MG/DL — SIGNIFICANT CHANGE UP (ref 0.5–1.3)
GLUCOSE SERPL-MCNC: 102 MG/DL — HIGH (ref 70–99)
HCT VFR BLD CALC: 27.2 % — LOW (ref 39–50)
HGB BLD-MCNC: 8.9 G/DL — LOW (ref 13–17)
MAGNESIUM SERPL-MCNC: 1.9 MG/DL — SIGNIFICANT CHANGE UP (ref 1.6–2.6)
MCHC RBC-ENTMCNC: 31.7 PG — SIGNIFICANT CHANGE UP (ref 27–34)
MCHC RBC-ENTMCNC: 32.7 GM/DL — SIGNIFICANT CHANGE UP (ref 32–36)
MCV RBC AUTO: 96.8 FL — SIGNIFICANT CHANGE UP (ref 80–100)
NRBC # BLD: 0 /100 WBCS — SIGNIFICANT CHANGE UP (ref 0–0)
PHOSPHATE SERPL-MCNC: 2.7 MG/DL — SIGNIFICANT CHANGE UP (ref 2.5–4.5)
PLATELET # BLD AUTO: 188 K/UL — SIGNIFICANT CHANGE UP (ref 150–400)
POTASSIUM SERPL-MCNC: 3.1 MMOL/L — LOW (ref 3.5–5.3)
POTASSIUM SERPL-SCNC: 3.1 MMOL/L — LOW (ref 3.5–5.3)
RBC # BLD: 2.81 M/UL — LOW (ref 4.2–5.8)
RBC # FLD: 15.3 % — HIGH (ref 10.3–14.5)
SODIUM SERPL-SCNC: 139 MMOL/L — SIGNIFICANT CHANGE UP (ref 135–145)
WBC # BLD: 8.12 K/UL — SIGNIFICANT CHANGE UP (ref 3.8–10.5)
WBC # FLD AUTO: 8.12 K/UL — SIGNIFICANT CHANGE UP (ref 3.8–10.5)

## 2020-10-16 PROCEDURE — 74018 RADEX ABDOMEN 1 VIEW: CPT | Mod: 26

## 2020-10-16 PROCEDURE — 99232 SBSQ HOSP IP/OBS MODERATE 35: CPT

## 2020-10-16 RX ORDER — DEXTROSE MONOHYDRATE, SODIUM CHLORIDE, AND POTASSIUM CHLORIDE 50; .745; 4.5 G/1000ML; G/1000ML; G/1000ML
1000 INJECTION, SOLUTION INTRAVENOUS
Refills: 0 | Status: DISCONTINUED | OUTPATIENT
Start: 2020-10-16 | End: 2020-10-23

## 2020-10-16 RX ORDER — POTASSIUM CHLORIDE 20 MEQ
10 PACKET (EA) ORAL
Refills: 0 | Status: COMPLETED | OUTPATIENT
Start: 2020-10-16 | End: 2020-10-16

## 2020-10-16 RX ADMIN — ENOXAPARIN SODIUM 100 MILLIGRAM(S): 100 INJECTION SUBCUTANEOUS at 18:00

## 2020-10-16 RX ADMIN — Medication 1 MILLIGRAM(S): at 11:55

## 2020-10-16 RX ADMIN — SODIUM CHLORIDE 75 MILLILITER(S): 9 INJECTION, SOLUTION INTRAVENOUS at 05:30

## 2020-10-16 RX ADMIN — Medication 1 TABLET(S): at 11:55

## 2020-10-16 RX ADMIN — Medication 100 MILLIEQUIVALENT(S): at 12:27

## 2020-10-16 RX ADMIN — ENOXAPARIN SODIUM 100 MILLIGRAM(S): 100 INJECTION SUBCUTANEOUS at 05:30

## 2020-10-16 RX ADMIN — Medication 100 MILLIEQUIVALENT(S): at 10:13

## 2020-10-16 RX ADMIN — Medication 500 MILLIGRAM(S): at 18:00

## 2020-10-16 RX ADMIN — Medication 100 MILLIEQUIVALENT(S): at 11:12

## 2020-10-16 RX ADMIN — Medication 500 MILLIGRAM(S): at 05:30

## 2020-10-16 NOTE — PROGRESS NOTE ADULT - ASSESSMENT
HPI:  85M otherwise healthy presents to the ED for right hip pain following a mechanical fall. Pt was sitting down in a chair when he missed the chair falling onto his right hip, he experienced immediate right hip pain and swelling. He denies hit to the head, LOC, any other orthopedic injuries. Pt denies SOB, CP, calf pain. (05 Oct 2020 16:02)  ------------ As Above -------------  The patient presented with a mechanical fall and fractured his hip. S/P repair 10/6. Patient developed N/V on 10/10. NGT inserted this AM revealed 3 liters of fluid. CT Scan done afterwards revealed gastric distention > ileus.   Patient felt much better with NGT. No history of PUD. No GI symptoms on admission. On ASA 81 QD.  Prior to admission, the patient denies melena, hematochezia, hematemesis, nausea, vomiting, abdominal pain, constipation, diarrhea, or change in bowel movements Normal colonoscopy 2017.   Flatus but no BMs. Enema without any results.    Goo VS ileus-    Flatus (+), BM (-) . Patient failed trial of h20 via NGT  1) NPO  2) EGD Monday 3) f/u  KUB 4)  check electrolytes HPI:  85M otherwise healthy presents to the ED for right hip pain following a mechanical fall. Pt was sitting down in a chair when he missed the chair falling onto his right hip, he experienced immediate right hip pain and swelling. He denies hit to the head, LOC, any other orthopedic injuries. Pt denies SOB, CP, calf pain. (05 Oct 2020 16:02)  ------------ As Above -------------  The patient presented with a mechanical fall and fractured his hip. S/P repair 10/6. Patient developed N/V on 10/10. NGT inserted this AM revealed 3 liters of fluid. CT Scan done afterwards revealed gastric distention > ileus.   Patient felt much better with NGT. No history of PUD. No GI symptoms on admission. On ASA 81 QD.  Prior to admission, the patient denies melena, hematochezia, hematemesis, nausea, vomiting, abdominal pain, constipation, diarrhea, or change in bowel movements Normal colonoscopy 2017.   Flatus but no BMs. Enema without any results.    Goo VS ileus-    Flatus (+), BM (-) . Patient failed trial of h20 via NGT  1) NPO  2) EGD Monday 3) f/u  KUB 4)  correct electrolytes

## 2020-10-16 NOTE — PROGRESS NOTE ADULT - ASSESSMENT
84 y/o male with acute hip fracture and Afib.     -Routine post surgical ortho care. pain controlled   -Staples to be removed POD 14  Ortho stable for DC, Outpatient follow up    -hypokalemia 9replacement     UTI   UA ++ LE and nitrite but minimal wbc and epithelial cells. ucx neg. remains afebrile   afebriles/p three day course of levaquin     acute blood loss anemia. 2/2 surgery   - cbc stable   - trend cbc. no bloody stool      Afib  -Echo reviewed  - Pt  agreed to AC so would recommend a NOAC. He reports only falling this one time. understands risk of falling on AC   - restart  xarelto 20 mg daily when able. will change to lovenox for now as he is npo     vomitng   -2/2 post op ileus vs gastric outlet syndrome  Had large bm  and passing gas but was vomiting even while NPO so NG was placed and filled 3L.  outputn remains high abdomen remains soft nt. ++BS.  GI consult appreciated KUB still with ilius?  aspiration PNA   - ct shows lower lobe opacities and wbc stable and afebrile. procalcitonin normal will dc levaquin   renal mass   - incidentally found on ct  - needs follow up with urologist   - pt aware but will remind the pt prior to dc     dispo to kaitlyn when clinically stable

## 2020-10-16 NOTE — PROGRESS NOTE ADULT - SUBJECTIVE AND OBJECTIVE BOX
Patient is a 85y old  Male who presents with a chief complaint of right hip fx (16 Oct 2020 18:52)      HPI:  85M otherwise healthy presents to the ED for right hip pain following a mechanical fall. Pt was sitting down in a chair when he missed the chair falling onto his right hip, he experienced immediate right hip pain and swelling. He denies hit to the head, LOC, any other orthopedic injuries. Pt denies SOB, CP, calf pain. (05 Oct 2020 16:02)      INTERVAL HPI/OVERNIGHT EVENTS: Patient seen earlier today  Flatus (+)  BM (-) Patient had 50 ml / hr x 6 hours of water last night. After 6 hours, nurse suctioned out 600 ml. No abdominal pain    MEDICATIONS  (STANDING):  ascorbic acid 500 milliGRAM(s) Oral two times a day  dextrose 5% + lactated ringers. 1000 milliLiter(s) (75 mL/Hr) IV Continuous <Continuous>  enoxaparin Injectable 100 milliGRAM(s) SubCutaneous two times a day  folic acid 1 milliGRAM(s) Oral daily  multivitamin 1 Tablet(s) Oral daily    MEDICATIONS  (PRN):  acetaminophen   Tablet .. 975 milliGRAM(s) Oral every 8 hours PRN Moderate Pain (4 - 6)  benzocaine 15 mG/menthol 3.6 mG (Sugar-Free) Lozenge 1 Lozenge Oral every 8 hours PRN Sore Throat  calcium carbonate    500 mG (Tums) Chewable 2 Tablet(s) Chew every 4 hours PRN Heartburn  guaiFENesin   Syrup  (Sugar-Free) 200 milliGRAM(s) Oral every 6 hours PRN Cough  melatonin 3 milliGRAM(s) Oral at bedtime PRN Sleep  ondansetron Injectable 4 milliGRAM(s) IV Push every 6 hours PRN Nausea and/or Vomiting  traMADol 25 milliGRAM(s) Oral every 6 hours PRN Moderate Pain (4 - 6)  traMADol 50 milliGRAM(s) Oral every 6 hours PRN Severe Pain (7 - 10)      FAMILY HISTORY:      Allergies    No Known Allergies    Intolerances        PMH/PSH:  No pertinent past medical history    No significant past surgical history          REVIEW OF SYSTEMS:  CONSTITUTIONAL: No fever, weight loss,  EYES: No eye pain, visual disturbances, or discharge  ENMT:  No difficulty hearing, tinnitus, vertigo; No sinus or throat pain  NECK: No pain or stiffness  BREASTS: No pain, masses, or nipple discharge  RESPIRATORY: No cough, wheezing, chills or hemoptysis; No shortness of breath  CARDIOVASCULAR: No chest pain, palpitations, dizziness, or leg swelling  GASTROINTESTINAL:  see above  GENITOURINARY: No dysuria, frequency, hematuria, or incontinence  NEUROLOGICAL: No headaches, memory loss, loss of strength, numbness, or tremors  SKIN: No itching, burning, rashes, or lesions   LYMPH NODES: No enlarged glands  ENDOCRINE: No heat or cold intolerance; No hair loss  MUSCULOSKELETAL: No joint pain or swelling; No muscle, back, or extremity pain  PSYCHIATRIC: No depression, anxiety, mood swings, or difficulty sleeping  HEME/LYMPH: No easy bruising, or bleeding gums  ALLERGY AND IMMUNOLOGIC: No hives or eczema    Vital Signs Last 24 Hrs  T(C): 36.8 (16 Oct 2020 17:41), Max: 37.1 (15 Oct 2020 23:30)  T(F): 98.2 (16 Oct 2020 17:41), Max: 98.7 (15 Oct 2020 23:30)  HR: 98 (16 Oct 2020 17:41) (90 - 108)  BP: 106/63 (16 Oct 2020 17:41) (91/58 - 111/63)  BP(mean): --  RR: 18 (16 Oct 2020 17:41) (18 - 18)  SpO2: 98% (16 Oct 2020 17:41) (96% - 98%)    PHYSICAL EXAM:  GENERAL: NAD, well-groomed, well-developed  HEAD:  Atraumatic, Normocephalic  EYES: EOMI, PERRLA, conjunctiva and sclera clear  NECK: Supple, No JVD, Normal thyroid  NERVOUS SYSTEM:  Alert & Oriented X3, Good concentration; Motor Strength 5/5 B/L upper and lower extremities;  CHEST/LUNG: Clear to percussion bilaterally; No rales, rhonchi, wheezing, or rubs  HEART: Regular rate and rhythm; No murmurs, rubs, or gallops  ABDOMEN: Soft, Nontender, Nondistended; Bowel sounds present  EXTREMITIES:  2+ Peripheral Pulses, No clubbing, cyanosis, or edema  LYMPH: No lymphadenopathy noted  SKIN: No rashes or lesions    LAB                          8.9    8.12  )-----------( 188      ( 16 Oct 2020 07:30 )             27.2       CBC:  10-16 @ 07:30  WBC 8.12   Hgb 8.9   Hct 27.2   Plts 188  MCV 96.8  10-15 @ 06:36  WBC 8.41   Hgb 10.1   Hct 31.6   Plts 257  MCV 99.1  10-14 @ 07:07  WBC 7.80   Hgb 9.7   Hct 30.4   Plts 273  MCV 98.7  10-13 @ 06:28  WBC 9.31   Hgb 8.7   Hct 27.2   Plts 255  MCV 98.2  10-12 @ 06:37  WBC 13.31   Hgb 9.8   Hct 29.5   Plts 211  MCV 97.0  10-11 @ 09:06  WBC 10.34   Hgb 10.0   Hct 30.3   Plts 256  MCV 97.7      Chemistry:  10-16 @ 07:30  Na+ 139  K+ 3.1  Cl- 104  CO2 32  BUN 25  Cr 0.86     10-15 @ 06:36  Na+ 138  K+ 3.2  Cl- 101  CO2 30  BUN 22  Cr 0.86     10-14 @ 07:07  Na+ 139  K+ 3.4  Cl- 99  CO2 33  BUN 26  Cr 0.81     10-13 @ 06:28  Na+ 138  K+ 3.3  Cl- 100  CO2 32  BUN 28  Cr 0.76     10-12 @ 06:37  Na+ 136  K+ 4.2  Cl- 97  CO2 33  BUN 37  Cr 1.01     10-11 @ 09:06  Na+ 139  K+ 4.4  Cl- 100  CO2 32  BUN 35  Cr 1.11         Glucose, Serum: 102 mg/dL (10-16 @ 07:30)  Glucose, Serum: 94 mg/dL (10-15 @ 06:36)  Glucose, Serum: 99 mg/dL (10-14 @ 07:07)  Glucose, Serum: 92 mg/dL (10-13 @ 06:28)  Glucose, Serum: 131 mg/dL (10-12 @ 06:37)  Glucose, Serum: 152 mg/dL (10-11 @ 09:06)      16 Oct 2020 07:30    139    |  104    |  25     ----------------------------<  102    3.1     |  32     |  0.86   15 Oct 2020 06:36    138    |  101    |  22     ----------------------------<  94     3.2     |  30     |  0.86   14 Oct 2020 07:07    139    |  99     |  26     ----------------------------<  99     3.4     |  33     |  0.81   13 Oct 2020 06:28    138    |  100    |  28     ----------------------------<  92     3.3     |  32     |  0.76   12 Oct 2020 06:37    136    |  97     |  37     ----------------------------<  131    4.2     |  33     |  1.01   11 Oct 2020 09:06    139    |  100    |  35     ----------------------------<  152    4.4     |  32     |  1.11     Ca    8.8        16 Oct 2020 07:30  Ca    8.5        15 Oct 2020 06:36  Ca    8.9        14 Oct 2020 07:07  Ca    8.7        13 Oct 2020 06:28  Ca    8.7        12 Oct 2020 06:37  Ca    8.9        11 Oct 2020 09:06  Phos  2.7       16 Oct 2020 07:30  Phos  2.9       15 Oct 2020 06:38  Phos  3.2       13 Oct 2020 06:28  Phos  3.2       11 Oct 2020 09:06  Mg     1.9       16 Oct 2020 07:30  Mg     2.1       15 Oct 2020 06:38  Mg     2.3       13 Oct 2020 06:28  Mg     3.0       11 Oct 2020 09:06                CAPILLARY BLOOD GLUCOSE              RADIOLOGY & ADDITIONAL TESTS:    Imaging Personally Reviewed:  [ ] YES  [ ] NO    Consultant(s) Notes Reviewed:  [ ] YES  [ ] NO    Care Discussed with Consultants/Other Providers [ ] YES  [ ] NO

## 2020-10-17 LAB
ANION GAP SERPL CALC-SCNC: 5 MMOL/L — SIGNIFICANT CHANGE UP (ref 5–17)
BUN SERPL-MCNC: 24 MG/DL — HIGH (ref 7–23)
CALCIUM SERPL-MCNC: 8.8 MG/DL — SIGNIFICANT CHANGE UP (ref 8.5–10.1)
CHLORIDE SERPL-SCNC: 102 MMOL/L — SIGNIFICANT CHANGE UP (ref 96–108)
CO2 SERPL-SCNC: 33 MMOL/L — HIGH (ref 22–31)
CREAT SERPL-MCNC: 0.85 MG/DL — SIGNIFICANT CHANGE UP (ref 0.5–1.3)
GLUCOSE SERPL-MCNC: 92 MG/DL — SIGNIFICANT CHANGE UP (ref 70–99)
HCT VFR BLD CALC: 30.3 % — LOW (ref 39–50)
HGB BLD-MCNC: 9.8 G/DL — LOW (ref 13–17)
MCHC RBC-ENTMCNC: 31.8 PG — SIGNIFICANT CHANGE UP (ref 27–34)
MCHC RBC-ENTMCNC: 32.3 GM/DL — SIGNIFICANT CHANGE UP (ref 32–36)
MCV RBC AUTO: 98.4 FL — SIGNIFICANT CHANGE UP (ref 80–100)
NRBC # BLD: 0 /100 WBCS — SIGNIFICANT CHANGE UP (ref 0–0)
PLATELET # BLD AUTO: 183 K/UL — SIGNIFICANT CHANGE UP (ref 150–400)
POTASSIUM SERPL-MCNC: 3.8 MMOL/L — SIGNIFICANT CHANGE UP (ref 3.5–5.3)
POTASSIUM SERPL-SCNC: 3.8 MMOL/L — SIGNIFICANT CHANGE UP (ref 3.5–5.3)
RBC # BLD: 3.08 M/UL — LOW (ref 4.2–5.8)
RBC # FLD: 15.3 % — HIGH (ref 10.3–14.5)
SODIUM SERPL-SCNC: 140 MMOL/L — SIGNIFICANT CHANGE UP (ref 135–145)
WBC # BLD: 7.67 K/UL — SIGNIFICANT CHANGE UP (ref 3.8–10.5)
WBC # FLD AUTO: 7.67 K/UL — SIGNIFICANT CHANGE UP (ref 3.8–10.5)

## 2020-10-17 PROCEDURE — 99232 SBSQ HOSP IP/OBS MODERATE 35: CPT

## 2020-10-17 RX ADMIN — Medication 500 MILLIGRAM(S): at 05:45

## 2020-10-17 RX ADMIN — Medication 1 ENEMA: at 20:48

## 2020-10-17 RX ADMIN — ENOXAPARIN SODIUM 100 MILLIGRAM(S): 100 INJECTION SUBCUTANEOUS at 05:45

## 2020-10-17 RX ADMIN — ENOXAPARIN SODIUM 100 MILLIGRAM(S): 100 INJECTION SUBCUTANEOUS at 17:50

## 2020-10-17 RX ADMIN — Medication 975 MILLIGRAM(S): at 12:04

## 2020-10-17 RX ADMIN — Medication 1 MILLIGRAM(S): at 11:04

## 2020-10-17 RX ADMIN — Medication 500 MILLIGRAM(S): at 17:50

## 2020-10-17 RX ADMIN — Medication 1 TABLET(S): at 11:04

## 2020-10-17 RX ADMIN — Medication 975 MILLIGRAM(S): at 11:04

## 2020-10-17 RX ADMIN — DEXTROSE MONOHYDRATE, SODIUM CHLORIDE, AND POTASSIUM CHLORIDE 63 MILLILITER(S): 50; .745; 4.5 INJECTION, SOLUTION INTRAVENOUS at 08:09

## 2020-10-17 RX ADMIN — DEXTROSE MONOHYDRATE, SODIUM CHLORIDE, AND POTASSIUM CHLORIDE 63 MILLILITER(S): 50; .745; 4.5 INJECTION, SOLUTION INTRAVENOUS at 03:39

## 2020-10-17 NOTE — PROGRESS NOTE ADULT - ASSESSMENT
86 y/o male with acute hip fracture and Afib.  nikosius will plan for EGD Monday     -Routine post surgical ortho care. pain controlled   -Staples to be removed POD 14  Ortho stable for DC, Outpatient follow up    -hypokalemia replaced    UTI   UA ++ LE and nitrite but minimal wbc and epithelial cells. ucx neg. remains afebrile   afebriles/p three day course of levaquin     acute blood loss anemia. 2/2 surgery   - cbc stable   - trend cbc. no bloody stool      Afib  -Echo reviewed  - Pt  agreed to AC so would recommend a NOAC. He reports only falling this one time. understands risk of falling on AC   - restart  xarelto 20 mg daily when able. lovenox for now as he is npo     vomitng   -2/2 post op ileus vs gastric outlet syndrome  Had large bm  and passing gas but was vomiting even while NPO so NG was placed and filled 3L.  outputn remains high abdomen remains soft nt. ++BS.  GI consult appreciated KUB still with ilius?  aspiration PNA   - ct shows lower lobe opacities and wbc stable and afebrile. procalcitonin normal off Levaquin   renal mass   - incidentally found on ct  - needs follow up with urologist   - pt aware but will remind the pt prior to dc     dispo to kaitlyn when clinically stable

## 2020-10-17 NOTE — PROGRESS NOTE ADULT - ASSESSMENT
HPI:  85M otherwise healthy presents to the ED for right hip pain following a mechanical fall. Pt was sitting down in a chair when he missed the chair falling onto his right hip, he experienced immediate right hip pain and swelling. He denies hit to the head, LOC, any other orthopedic injuries. Pt denies SOB, CP, calf pain. (05 Oct 2020 16:02)  ------------ As Above -------------  The patient presented with a mechanical fall and fractured his hip. S/P repair 10/6. Patient developed N/V on 10/10. NGT inserted this AM revealed 3 liters of fluid. CT Scan done afterwards revealed gastric distention > ileus.   Patient felt much better with NGT. No history of PUD. No GI symptoms on admission. On ASA 81 QD.  Prior to admission, the patient denies melena, hematochezia, hematemesis, nausea, vomiting, abdominal pain, constipation, diarrhea, or change in bowel movements Normal colonoscopy 2017.   Flatus but no BMs. Enema without any results.    Goo VS ileus-    Flatus (+), BM (-) . Patient failed trial of h20 via NGT  1) NPO  2) EGD Monday 3) f/u  KUB 4)  correct electrolytes

## 2020-10-17 NOTE — PROGRESS NOTE ADULT - SUBJECTIVE AND OBJECTIVE BOX
Patient is a 85y old  Male who presents with a chief complaint of s/p fall --> right hip fx (17 Oct 2020 13:20)      HPI:  85M otherwise healthy presents to the ED for right hip pain following a mechanical fall. Pt was sitting down in a chair when he missed the chair falling onto his right hip, he experienced immediate right hip pain and swelling. He denies hit to the head, LOC, any other orthopedic injuries. Pt denies SOB, CP, calf pain. (05 Oct 2020 16:02)        INTERVAL HPI/OVERNIGHT EVENTS:  Blood tinged NGT aspirate. No N/V  Flatus (+)  BM (-)     MEDICATIONS  (STANDING):  ascorbic acid 500 milliGRAM(s) Oral two times a day  dextrose 5% + sodium chloride 0.45% with potassium chloride 20 mEq/L 1000 milliLiter(s) (63 mL/Hr) IV Continuous <Continuous>  enoxaparin Injectable 100 milliGRAM(s) SubCutaneous two times a day  folic acid 1 milliGRAM(s) Oral daily  multivitamin 1 Tablet(s) Oral daily    MEDICATIONS  (PRN):  acetaminophen   Tablet .. 975 milliGRAM(s) Oral every 8 hours PRN Moderate Pain (4 - 6)  benzocaine 15 mG/menthol 3.6 mG (Sugar-Free) Lozenge 1 Lozenge Oral every 8 hours PRN Sore Throat  calcium carbonate    500 mG (Tums) Chewable 2 Tablet(s) Chew every 4 hours PRN Heartburn  guaiFENesin   Syrup  (Sugar-Free) 200 milliGRAM(s) Oral every 6 hours PRN Cough  melatonin 3 milliGRAM(s) Oral at bedtime PRN Sleep  ondansetron Injectable 4 milliGRAM(s) IV Push every 6 hours PRN Nausea and/or Vomiting  traMADol 25 milliGRAM(s) Oral every 6 hours PRN Moderate Pain (4 - 6)  traMADol 50 milliGRAM(s) Oral every 6 hours PRN Severe Pain (7 - 10)      FAMILY HISTORY:      Allergies    No Known Allergies    Intolerances        PMH/PSH:  No pertinent past medical history    No significant past surgical history          REVIEW OF SYSTEMS:  CONSTITUTIONAL: No fever, weight loss,   EYES: No eye pain, visual disturbances, or discharge  ENMT:  No difficulty hearing, tinnitus, vertigo; No sinus or throat pain  NECK: No pain or stiffness  BREASTS: No pain, masses, or nipple discharge  RESPIRATORY: No cough, wheezing, chills or hemoptysis; No shortness of breath  CARDIOVASCULAR: No chest pain, palpitations, dizziness, or leg swelling  GASTROINTESTINAL:  See above  GENITOURINARY: No dysuria, frequency, hematuria, or incontinence  NEUROLOGICAL: No headaches, memory loss, loss of strength, numbness, or tremors  SKIN: No itching, burning, rashes, or lesions   LYMPH NODES: No enlarged glands  ENDOCRINE: No heat or cold intolerance; No hair loss  MUSCULOSKELETAL: No joint pain or swelling; No muscle, back, or extremity pain  PSYCHIATRIC: No depression, anxiety, mood swings, or difficulty sleeping  HEME/LYMPH: No easy bruising, or bleeding gums  ALLERGY AND IMMUNOLOGIC: No hives or eczema    Vital Signs Last 24 Hrs  T(C): 36.5 (17 Oct 2020 13:00), Max: 37.2 (16 Oct 2020 23:17)  T(F): 97.7 (17 Oct 2020 13:00), Max: 99 (16 Oct 2020 23:17)  HR: 77 (17 Oct 2020 13:00) (77 - 98)  BP: 96/58 (17 Oct 2020 13:00) (96/58 - 106/63)  BP(mean): --  RR: 18 (17 Oct 2020 13:00) (18 - 18)  SpO2: 97% (17 Oct 2020 13:00) (97% - 98%)    PHYSICAL EXAM:  GENERAL: NAD, well-groomed, well-developed  HEAD:  Atraumatic, Normocephalic  EYES: EOMI, PERRLA, conjunctiva and sclera clear  NECK: Supple, No JVD, Normal thyroid  NERVOUS SYSTEM:  Alert & Oriented X3, Good concentration; Motor Strength 5/5 B/L upper and lower extremities;  CHEST/LUNG: Clear to percussion bilaterally; No rales, rhonchi, wheezing, or rubs  HEART: Regular rate and rhythm; No murmurs, rubs, or gallops  ABDOMEN: Soft, Nontender, Nondistended; Bowel sounds present  EXTREMITIES:  2+ Peripheral Pulses, No clubbing, cyanosis, or edema  LYMPH: No lymphadenopathy noted  SKIN: No rashes or lesions    LAB                          9.8    7.67  )-----------( 183      ( 17 Oct 2020 06:51 )             30.3       CBC:  10-17 @ 06:51  WBC 7.67   Hgb 9.8   Hct 30.3   Plts 183  MCV 98.4  10-16 @ 07:30  WBC 8.12   Hgb 8.9   Hct 27.2   Plts 188  MCV 96.8  10-15 @ 06:36  WBC 8.41   Hgb 10.1   Hct 31.6   Plts 257  MCV 99.1  10-14 @ 07:07  WBC 7.80   Hgb 9.7   Hct 30.4   Plts 273  MCV 98.7  10-13 @ 06:28  WBC 9.31   Hgb 8.7   Hct 27.2   Plts 255  MCV 98.2  10-12 @ 06:37  WBC 13.31   Hgb 9.8   Hct 29.5   Plts 211  MCV 97.0  10-11 @ 09:06  WBC 10.34   Hgb 10.0   Hct 30.3   Plts 256  MCV 97.7      Chemistry:  10-17 @ 06:50  Na+ 140  K+ 3.8  Cl- 102  CO2 33  BUN 24  Cr 0.85     10-16 @ 07:30  Na+ 139  K+ 3.1  Cl- 104  CO2 32  BUN 25  Cr 0.86     10-15 @ 06:36  Na+ 138  K+ 3.2  Cl- 101  CO2 30  BUN 22  Cr 0.86     10-14 @ 07:07  Na+ 139  K+ 3.4  Cl- 99  CO2 33  BUN 26  Cr 0.81     10-13 @ 06:28  Na+ 138  K+ 3.3  Cl- 100  CO2 32  BUN 28  Cr 0.76     10-12 @ 06:37  Na+ 136  K+ 4.2  Cl- 97  CO2 33  BUN 37  Cr 1.01     10-11 @ 09:06  Na+ 139  K+ 4.4  Cl- 100  CO2 32  BUN 35  Cr 1.11         Glucose, Serum: 92 mg/dL (10-17 @ 06:50)  Glucose, Serum: 102 mg/dL (10-16 @ 07:30)  Glucose, Serum: 94 mg/dL (10-15 @ 06:36)  Glucose, Serum: 99 mg/dL (10-14 @ 07:07)  Glucose, Serum: 92 mg/dL (10-13 @ 06:28)  Glucose, Serum: 131 mg/dL (10-12 @ 06:37)  Glucose, Serum: 152 mg/dL (10-11 @ 09:06)      17 Oct 2020 06:50    140    |  102    |  24     ----------------------------<  92     3.8     |  33     |  0.85   16 Oct 2020 07:30    139    |  104    |  25     ----------------------------<  102    3.1     |  32     |  0.86   15 Oct 2020 06:36    138    |  101    |  22     ----------------------------<  94     3.2     |  30     |  0.86   14 Oct 2020 07:07    139    |  99     |  26     ----------------------------<  99     3.4     |  33     |  0.81   13 Oct 2020 06:28    138    |  100    |  28     ----------------------------<  92     3.3     |  32     |  0.76   12 Oct 2020 06:37    136    |  97     |  37     ----------------------------<  131    4.2     |  33     |  1.01   11 Oct 2020 09:06    139    |  100    |  35     ----------------------------<  152    4.4     |  32     |  1.11     Ca    8.8        17 Oct 2020 06:50  Ca    8.8        16 Oct 2020 07:30  Ca    8.5        15 Oct 2020 06:36  Ca    8.9        14 Oct 2020 07:07  Ca    8.7        13 Oct 2020 06:28  Ca    8.7        12 Oct 2020 06:37  Ca    8.9        11 Oct 2020 09:06  Phos  2.7       16 Oct 2020 07:30  Phos  2.9       15 Oct 2020 06:38  Phos  3.2       13 Oct 2020 06:28  Phos  3.2       11 Oct 2020 09:06  Mg     1.9       16 Oct 2020 07:30  Mg     2.1       15 Oct 2020 06:38  Mg     2.3       13 Oct 2020 06:28  Mg     3.0       11 Oct 2020 09:06                CAPILLARY BLOOD GLUCOSE              RADIOLOGY & ADDITIONAL TESTS:    Imaging Personally Reviewed:  [ ] YES  [ ] NO    Consultant(s) Notes Reviewed:  [ ] YES  [ ] NO    Care Discussed with Consultants/Other Providers [ ] YES  [ ] NO

## 2020-10-17 NOTE — PROGRESS NOTE ADULT - SUBJECTIVE AND OBJECTIVE BOX
Patient seen and examined at bedside.  NGT to LWS, flushed with 50 cc water.    Admits to flatus, denies BM.   Denies pain, nausea/ vomiting.  States he feels hungry.  Currently NPO except meds / icechips.      Vital Signs Last 24 Hrs  T(F): 97.7 (10-17-20 @ 13:00), Max: 99 (10-16-20 @ 23:17)  HR: 77 (10-17-20 @ 13:00)  BP: 96/58 (10-17-20 @ 13:00)  RR: 18 (10-17-20 @ 13:00)  SpO2: 97% (10-17-20 @ 13:00)  Wt(kg): --   CAPILLARY BLOOD GLUCOSE    GENERAL: Alert, oriented, NAD, states he feels hungry  CHEST/LUNG: Clear to auscultation bilaterally, respirations nonlabored  HEART: Regular rate and rhythm;   ABDOMEN: + Bowel sounds, soft, Nontender, Nondistended, no guarding  EXTREMITIES:  no calf tenderness, No edema    I&O's Detail    16 Oct 2020 07:01  -  17 Oct 2020 07:00  --------------------------------------------------------  IN:    dextrose 5% + lactated ringers: 900 mL    dextrose 5% + sodium chloride 0.45% w/ Additives: 630 mL  Total IN: 1530 mL    OUT:    Nasogastric/Oral tube (mL): 1320 mL    Voided (mL): 500 mL  Total OUT: 1820 mL    Total NET: -290 mL      17 Oct 2020 07:01  -  17 Oct 2020 13:22  --------------------------------------------------------  IN:  Total IN: 0 mL    OUT:    Nasogastric/Oral tube (mL): 750 mL  Total OUT: 750 mL    Total NET: -750 mL      LABS:                        9.8    7.67  )-----------( 183      ( 17 Oct 2020 06:51 )             30.3     10-17    140  |  102  |  24<H>  ----------------------------<  92  3.8   |  33<H>  |  0.85    Ca    8.8      17 Oct 2020 06:50  Phos  2.7     10-16  Mg     1.9     10-16    Mr. Hammonds, 85 year old male, s/p fall (missed chair) and fell onto right hip --> admitted 10/5 right hip fracture.  S/P IM nailing of right femur on 10/6/2020 (POD 11).   Surgery consulted for possible gastric outlet obstruction vs. ileus / vomiting.  NGT placed and plan for EGD on Monday (10/19/2020).      -  Clamp tube at 10 am til 2 pm.  Hold ice chips and monitor NGT output.    -  ?? clears if no NGT output after 4 hours (will discuss with attending)  -  Continue to monitor I/O, labs  - DVT prophylaxis, Incentive Spirometer, OOB, Ambulating, pain control  -  Physical Therapy  -  medical management per primary team.     Patient seen and examined at bedside.  NGT to LWS, flushed with 50 cc water.    Admits to flatus, denies BM.   Denies pain, nausea/ vomiting.  States he feels hungry.  Currently NPO except meds / icechips.      Vital Signs Last 24 Hrs  T(F): 97.7 (10-17-20 @ 13:00), Max: 99 (10-16-20 @ 23:17)  HR: 77 (10-17-20 @ 13:00)  BP: 96/58 (10-17-20 @ 13:00)  RR: 18 (10-17-20 @ 13:00)  SpO2: 97% (10-17-20 @ 13:00)  Wt(kg): --   CAPILLARY BLOOD GLUCOSE    GENERAL: Alert, oriented, NAD, states he feels hungry  CHEST/LUNG: Clear to auscultation bilaterally, respirations nonlabored  HEART: Regular rate and rhythm;   ABDOMEN: + Bowel sounds, soft, Nontender, Nondistended, no guarding  EXTREMITIES:  no calf tenderness, No edema    I&O's Detail    16 Oct 2020 07:01  -  17 Oct 2020 07:00  --------------------------------------------------------  IN:    dextrose 5% + lactated ringers: 900 mL    dextrose 5% + sodium chloride 0.45% w/ Additives: 630 mL  Total IN: 1530 mL    OUT:    Nasogastric/Oral tube (mL): 1320 mL    Voided (mL): 500 mL  Total OUT: 1820 mL    Total NET: -290 mL      17 Oct 2020 07:01  -  17 Oct 2020 13:22  --------------------------------------------------------  IN:  Total IN: 0 mL    OUT:    Nasogastric/Oral tube (mL): 750 mL  Total OUT: 750 mL    Total NET: -750 mL      LABS:                        9.8    7.67  )-----------( 183      ( 17 Oct 2020 06:51 )             30.3     10-17    140  |  102  |  24<H>  ----------------------------<  92  3.8   |  33<H>  |  0.85    Ca    8.8      17 Oct 2020 06:50  Phos  2.7     10-16  Mg     1.9     10-16    Mr. Hammonds, 85 year old male, s/p fall (missed chair) and fell onto right hip --> admitted 10/5 right hip fracture.  S/P IM nailing of right femur on 10/6/2020 (POD 11).   Surgery consulted for possible gastric outlet obstruction vs. ileus / vomiting.  NGT placed and plan for EGD on Monday (10/19/2020).      -  Clamp tube at 10 am til 2 pm.  Hold ice chips and monitor NGT output.      -  ?? clears if no NGT output after 4 hours (will discuss with attending)  -  Continue to monitor I/O, labs  - DVT prophylaxis, Incentive Spirometer, OOB, Ambulating, pain control  -  Physical Therapy  -  medical management per primary team.    Addendum:  -  NGT residual at 2 pm. - 250cc.  Maintain on LWS.  -  Follow up GI recs.  No surgical intervention at this time. Will sign off for surgery.  Please re-consult as needed.

## 2020-10-17 NOTE — PROGRESS NOTE ADULT - SUBJECTIVE AND OBJECTIVE BOX
HPI:  85M otherwise healthy presents to the ED for right hip pain following a mechanical fall. Pt was sitting down in a chair when he missed the chair falling onto his right hip, he experienced immediate right hip pain and swelling. He denies hit to the head, LOC, any other orthopedic injuries. Pt denies SOB, CP, calf pain. (05 Oct 2020 16:02)    Patient is a 85y old  Male who presents with a chief complaint of right hip fx (16 Oct 2020 22:12)      INTERVAL HPI/OVERNIGHT EVENTS: no acute events overnight     MEDICATIONS  (STANDING):  ascorbic acid 500 milliGRAM(s) Oral two times a day  dextrose 5% + sodium chloride 0.45% with potassium chloride 20 mEq/L 1000 milliLiter(s) (63 mL/Hr) IV Continuous <Continuous>  enoxaparin Injectable 100 milliGRAM(s) SubCutaneous two times a day  folic acid 1 milliGRAM(s) Oral daily  multivitamin 1 Tablet(s) Oral daily    MEDICATIONS  (PRN):  acetaminophen   Tablet .. 975 milliGRAM(s) Oral every 8 hours PRN Moderate Pain (4 - 6)  benzocaine 15 mG/menthol 3.6 mG (Sugar-Free) Lozenge 1 Lozenge Oral every 8 hours PRN Sore Throat  calcium carbonate    500 mG (Tums) Chewable 2 Tablet(s) Chew every 4 hours PRN Heartburn  guaiFENesin   Syrup  (Sugar-Free) 200 milliGRAM(s) Oral every 6 hours PRN Cough  melatonin 3 milliGRAM(s) Oral at bedtime PRN Sleep  ondansetron Injectable 4 milliGRAM(s) IV Push every 6 hours PRN Nausea and/or Vomiting  traMADol 25 milliGRAM(s) Oral every 6 hours PRN Moderate Pain (4 - 6)  traMADol 50 milliGRAM(s) Oral every 6 hours PRN Severe Pain (7 - 10)      Allergies    No Known Allergies    Intolerances        REVIEW OF SYSTEMS:  CONSTITUTIONAL: No fever, weight loss, or fatigue  EYES: No eye pain, visual disturbances, or discharge  ENMT:  No difficulty hearing, tinnitus, vertigo; No sinus or throat pain  NECK: No pain or stiffness  BREASTS: No pain, masses, or nipple discharge  RESPIRATORY: No cough, wheezing, chills or hemoptysis; No shortness of breath  CARDIOVASCULAR: No chest pain, palpitations, dizziness, or leg swelling  GASTROINTESTINAL: No abdominal or epigastric pain. No nausea, vomiting, or hematemesis; No diarrhea or constipation. No melena or hematochezia.  GENITOURINARY: No dysuria, frequency, hematuria, or incontinence  NEUROLOGICAL: No headaches, memory loss, loss of strength, numbness, or tremors  SKIN: No itching, burning, rashes, or lesions   LYMPH NODES: No enlarged glands  ENDOCRINE: No heat or cold intolerance; No hair loss  MUSCULOSKELETAL: No joint pain or swelling; No muscle, back, or extremity pain  PSYCHIATRIC: No depression, anxiety, mood swings, or difficulty sleeping  HEME/LYMPH: No easy bruising, or bleeding gums  ALLERGY AND IMMUNOLOGIC: No hives or eczema    Vital Signs Last 24 Hrs  T(C): 36.6 (17 Oct 2020 05:28), Max: 37.2 (16 Oct 2020 23:17)  T(F): 97.9 (17 Oct 2020 05:28), Max: 99 (16 Oct 2020 23:17)  HR: 97 (17 Oct 2020 05:28) (90 - 98)  BP: 105/60 (17 Oct 2020 05:28) (91/58 - 106/63)  RR: 18 (17 Oct 2020 05:28) (18 - 18)  SpO2: 97% (17 Oct 2020 05:28) (97% - 98%)    PHYSICAL EXAM:  GENERAL: NAD, well-groomed, well-developed  HEAD:  Atraumatic, Normocephalic  EYES: EOMI, PERRLA, conjunctiva and sclera clear  ENMT: NGT in place   NECK: Supple, No JVD, Normal thyroid  NERVOUS SYSTEM:  Alert & Oriented X3, Good concentration; Motor Strength 5/5 B/L upper and lower extremities; DTRs 2+ intact and symmetric  CHEST/LUNG: Clear to percussion bilaterally; No rales, rhonchi, wheezing, or rubs  HEART: Regular rate and rhythm; No murmurs, rubs, or gallops  ABDOMEN: Soft, Nontender, Nondistended; Bowel sounds present  EXTREMITIES:  2+ Peripheral Pulses, No clubbing, cyanosis, or edema  LYMPH: No lymphadenopathy noted  SKIN: No rashes or lesions    LABS:                        9.8    7.67  )-----------( 183      ( 17 Oct 2020 06:51 )             30.3     10-17    140  |  102  |  24<H>  ----------------------------<  92  3.8   |  33<H>  |  0.85    Ca    8.8      17 Oct 2020 06:50  Phos  2.7     10-16  Mg     1.9     10-16          CAPILLARY BLOOD GLUCOSE          RADIOLOGY & ADDITIONAL TESTS:  < from: Xray Kidney Ureter Bladder (10.16.20 @ 09:02) >  EXAM:  XR KUB 1 VIEW                            PROCEDURE DATE:  10/16/2020          INTERPRETATION:  CLINICAL STATEMENT: Follow-up    TECHNIQUE: Single view of the abdomen.    COMPARISON: 10/14/2020    FINDINGS/  IMPRESSION:  Nonspecific nonobstructive bowel gas pattern. Feeding tube again noted tip under the diaphragm overlying the expected region of stomach.    No gross intraperitoneal free air.    < end of copied text >  1. Lower lobe opacities, which may represent atelectasis and/or pneumonia.  2. Gastric distention. Enteric tube in place.  3. Left upper pole renal mass (5 x 4.5 cm), suspicious for renal malignancy.  4. Urinary bladder intraluminal nodule (1.7 cm). Recommend direct visualization with cystoscopy for further assessment.  5. Colonic diverticulosis without CT evidence of acute diverticulitis.  6. Right hip postsurgical changes.  Imaging Personally Reviewed:  [ ] YES  [ ] NO    Consultant(s) Notes Reviewed:  [ ] YES  [ ] NO    Care Discussed with Consultants/Other Providers [ ] YES  [ ] NO

## 2020-10-18 ENCOUNTER — TRANSCRIPTION ENCOUNTER (OUTPATIENT)
Age: 85
End: 2020-10-18

## 2020-10-18 LAB
ANION GAP SERPL CALC-SCNC: 4 MMOL/L — LOW (ref 5–17)
BUN SERPL-MCNC: 24 MG/DL — HIGH (ref 7–23)
CALCIUM SERPL-MCNC: 8.7 MG/DL — SIGNIFICANT CHANGE UP (ref 8.5–10.1)
CHLORIDE SERPL-SCNC: 103 MMOL/L — SIGNIFICANT CHANGE UP (ref 96–108)
CO2 SERPL-SCNC: 33 MMOL/L — HIGH (ref 22–31)
CREAT SERPL-MCNC: 0.95 MG/DL — SIGNIFICANT CHANGE UP (ref 0.5–1.3)
GLUCOSE SERPL-MCNC: 88 MG/DL — SIGNIFICANT CHANGE UP (ref 70–99)
HCT VFR BLD CALC: 33.3 % — LOW (ref 39–50)
HGB BLD-MCNC: 10.2 G/DL — LOW (ref 13–17)
MAGNESIUM SERPL-MCNC: 2.3 MG/DL — SIGNIFICANT CHANGE UP (ref 1.6–2.6)
MCHC RBC-ENTMCNC: 30.6 GM/DL — LOW (ref 32–36)
MCHC RBC-ENTMCNC: 30.9 PG — SIGNIFICANT CHANGE UP (ref 27–34)
MCV RBC AUTO: 100.9 FL — HIGH (ref 80–100)
NRBC # BLD: 0 /100 WBCS — SIGNIFICANT CHANGE UP (ref 0–0)
PHOSPHATE SERPL-MCNC: 3.1 MG/DL — SIGNIFICANT CHANGE UP (ref 2.5–4.5)
PLATELET # BLD AUTO: 200 K/UL — SIGNIFICANT CHANGE UP (ref 150–400)
POTASSIUM SERPL-MCNC: 3.3 MMOL/L — LOW (ref 3.5–5.3)
POTASSIUM SERPL-SCNC: 3.3 MMOL/L — LOW (ref 3.5–5.3)
RBC # BLD: 3.3 M/UL — LOW (ref 4.2–5.8)
RBC # FLD: 15.2 % — HIGH (ref 10.3–14.5)
SODIUM SERPL-SCNC: 140 MMOL/L — SIGNIFICANT CHANGE UP (ref 135–145)
WBC # BLD: 10.68 K/UL — HIGH (ref 3.8–10.5)
WBC # FLD AUTO: 10.68 K/UL — HIGH (ref 3.8–10.5)

## 2020-10-18 PROCEDURE — 99232 SBSQ HOSP IP/OBS MODERATE 35: CPT

## 2020-10-18 RX ORDER — PANTOPRAZOLE SODIUM 20 MG/1
40 TABLET, DELAYED RELEASE ORAL EVERY 12 HOURS
Refills: 0 | Status: DISCONTINUED | OUTPATIENT
Start: 2020-10-18 | End: 2020-10-23

## 2020-10-18 RX ADMIN — PANTOPRAZOLE SODIUM 40 MILLIGRAM(S): 20 TABLET, DELAYED RELEASE ORAL at 11:55

## 2020-10-18 RX ADMIN — Medication 500 MILLIGRAM(S): at 17:42

## 2020-10-18 RX ADMIN — PANTOPRAZOLE SODIUM 40 MILLIGRAM(S): 20 TABLET, DELAYED RELEASE ORAL at 17:42

## 2020-10-18 RX ADMIN — Medication 1 TABLET(S): at 11:55

## 2020-10-18 RX ADMIN — Medication 1 MILLIGRAM(S): at 11:55

## 2020-10-18 RX ADMIN — Medication 500 MILLIGRAM(S): at 05:55

## 2020-10-18 RX ADMIN — ENOXAPARIN SODIUM 100 MILLIGRAM(S): 100 INJECTION SUBCUTANEOUS at 05:55

## 2020-10-18 NOTE — PROGRESS NOTE ADULT - ASSESSMENT
86 y/o male with acute hip fracture and Afib.      illius vs gastric outlet obstruction  will plan for EGD Monday     -Routine post surgical ortho care. pain controlled   -Staples to be removed POD 14  Ortho stable for DC, Outpatient follow up    -hypokalemia replaced o IVF with 20K     UTI   UA ++ LE and nitrite but minimal wbc and epithelial cells. ucx neg. remains afebrile   afebriles/p three day course of levaquin     acute blood loss anemia. 2/2 surgery   - cbc stable   - trend cbc. no bloody stool      Afib  -Echo reviewed  - Pt  agreed to AC so would recommend a NOAC. He reports only falling this one time. understands risk of falling on AC   - restart  xarelto 20 mg daily when able. lovenox for now as he is npo     vomitng   -2/2 post op ileus vs gastric outlet syndrome  Had large bm  and passing gas but was vomiting even while NPO so NG was placed and filled 3L.  outputn remains high abdomen remains soft nt. ++BS.  GI consult appreciated KUB still with ilius?  aspiration PNA   - ct shows lower lobe opacities and wbc stable and afebrile. procalcitonin normal off Levaquin   renal mass   - incidentally found on ct  - needs follow up with urologist   - pt aware but will remind the pt prior to dc     dispo to [facility] when clinically stable

## 2020-10-18 NOTE — PROGRESS NOTE ADULT - ASSESSMENT
HPI:  85M otherwise healthy presents to the ED for right hip pain following a mechanical fall. Pt was sitting down in a chair when he missed the chair falling onto his right hip, he experienced immediate right hip pain and swelling. He denies hit to the head, LOC, any other orthopedic injuries. Pt denies SOB, CP, calf pain. (05 Oct 2020 16:02)  ------------ As Above -------------  The patient presented with a mechanical fall and fractured his hip. S/P repair 10/6. Patient developed N/V on 10/10. NGT inserted this AM revealed 3 liters of fluid. CT Scan done afterwards revealed gastric distention > ileus.   Patient felt much better with NGT. No history of PUD. No GI symptoms on admission. On ASA 81 QD.  Prior to admission, the patient denies melena, hematochezia, hematemesis, nausea, vomiting, abdominal pain, constipation, diarrhea, or change in bowel movements Normal colonoscopy 2017.   Flatus but no BMs. Enema without any results.    Goo VS ileus-    Flatus (+), BM (+) . Patient failed trial of h20 via NGT  1) NPO  2) EGD Monday 3) f/u  KUB 4)  correct electrolytes

## 2020-10-18 NOTE — PROGRESS NOTE ADULT - SUBJECTIVE AND OBJECTIVE BOX
HPI:  85M otherwise healthy presents to the ED for right hip pain following a mechanical fall. Pt was sitting down in a chair when he missed the chair falling onto his right hip, he experienced immediate right hip pain and swelling. He denies hit to the head, LOC, any other orthopedic injuries. Pt denies SOB, CP, calf pain. (05 Oct 2020 16:02)    Patient is a 85y old  Male who presents with a chief complaint of right hip fx (17 Oct 2020 15:04)      INTERVAL HPI/OVERNIGHT EVENTS: no acute events very hunfry had a bowel movement     MEDICATIONS  (STANDING):  ascorbic acid 500 milliGRAM(s) Oral two times a day  dextrose 5% + sodium chloride 0.45% with potassium chloride 20 mEq/L 1000 milliLiter(s) (63 mL/Hr) IV Continuous <Continuous>  enoxaparin Injectable 100 milliGRAM(s) SubCutaneous two times a day  folic acid 1 milliGRAM(s) Oral daily  multivitamin 1 Tablet(s) Oral daily    MEDICATIONS  (PRN):  acetaminophen   Tablet .. 975 milliGRAM(s) Oral every 8 hours PRN Moderate Pain (4 - 6)  benzocaine 15 mG/menthol 3.6 mG (Sugar-Free) Lozenge 1 Lozenge Oral every 8 hours PRN Sore Throat  calcium carbonate    500 mG (Tums) Chewable 2 Tablet(s) Chew every 4 hours PRN Heartburn  guaiFENesin   Syrup  (Sugar-Free) 200 milliGRAM(s) Oral every 6 hours PRN Cough  melatonin 3 milliGRAM(s) Oral at bedtime PRN Sleep  ondansetron Injectable 4 milliGRAM(s) IV Push every 6 hours PRN Nausea and/or Vomiting  traMADol 25 milliGRAM(s) Oral every 6 hours PRN Moderate Pain (4 - 6)  traMADol 50 milliGRAM(s) Oral every 6 hours PRN Severe Pain (7 - 10)      Allergies    No Known Allergies    Intolerances        REVIEW OF SYSTEMS:  CONSTITUTIONAL: fatigue  EYES: No eye pain, visual disturbances, or discharge  ENMT:  No difficulty hearing, tinnitus, vertigo; No sinus or throat pain  NECK: No pain or stiffness  RESPIRATORY: No cough, wheezing, chills or hemoptysis; No shortness of breath  CARDIOVASCULAR: No chest pain, palpitations, dizziness, or leg swelling  GASTROINTESTINAL: hungry, coffee grounds in NGT GENITOURINARY: No dysuria, frequency, hematuria, or incontinence  NEUROLOGICAL: No headaches, memory loss, loss of strength, numbness, or tremors  SKIN: No itching, burning, rashes, or lesions   LYMPH NODES: No enlarged glands  ENDOCRINE: No heat or cold intolerance; No hair loss  MUSCULOSKELETAL: No joint pain or swelling; No muscle, back, or extremity pain  PSYCHIATRIC: No depression, anxiety, mood swings, or difficulty sleeping  HEME/LYMPH: No easy bruising, or bleeding gums  ALLERGY AND IMMUNOLOGIC: No hives or eczema    Vital Signs Last 24 Hrs  T(C): 36.6 (18 Oct 2020 06:15), Max: 36.7 (17 Oct 2020 23:42)  T(F): 97.9 (18 Oct 2020 06:15), Max: 98.1 (17 Oct 2020 23:42)  HR: 94 (18 Oct 2020 06:15) (77 - 99)  BP: 103/62 (18 Oct 2020 06:15) (96/58 - 111/68)  RR: 18 (18 Oct 2020 06:15) (18 - 18)  SpO2: 96% (18 Oct 2020 06:15) (96% - 97%)    PHYSICAL EXAM:  GENERAL: NAD, well-groomed, well-developed  HEAD:  Atraumatic, Normocephalic  EYES: EOMI, PERRLA, conjunctiva and sclera clear  ENMT: NGT with coffee grounds NECK: Supple, No JVD, Normal thyroid  NERVOUS SYSTEM:  Alert & Oriented X3, Good concentration; Motor Strength 5/5 B/L upper and lower extremities; DTRs 2+ intact and symmetric  CHEST/LUNG: Clear to percussion bilaterally; No rales, rhonchi, wheezing, or rubs  HEART: Regular rate and rhythm; No murmurs, rubs, or gallops  ABDOMEN: Soft, Nontender, Nondistended; Bowel sounds present  EXTREMITIES: Right  hip fracture wound dressed LYMPH: No lymphadenopathy noted  SKIN: No rashes or lesions    LABS:                        10.2   10.68 )-----------( 200      ( 18 Oct 2020 07:03 )             33.3     10-18    140  |  103  |  24<H>  ----------------------------<  88  3.3<L>   |  33<H>  |  0.95    Ca    8.7      18 Oct 2020 07:03  Phos  3.1     10-18  Mg     2.3     10-18          CAPILLARY BLOOD GLUCOSE          RADIOLOGY & ADDITIONAL TESTS:  < from: Xray Kidney Ureter Bladder (10.16.20 @ 09:02) >  EXAM:  XR KUB 1 VIEW                            PROCEDURE DATE:  10/16/2020          INTERPRETATION:  CLINICAL STATEMENT: Follow-up    TECHNIQUE: Single view of the abdomen.    COMPARISON: 10/14/2020    FINDINGS/  IMPRESSION:  Nonspecific nonobstructive bowel gas pattern. Feeding tube again noted tip under the diaphragm overlying the expected region of stomach.    No gross intraperitoneal free air.    < end of copied text >    Imaging Personally Reviewed:  [X ] YES  [ ] NO    Consultant(s) Notes Reviewed:  [ X] YES  [ ] NO    Care Discussed with Consultants/Other Providers [X ] YES  [ ] NO

## 2020-10-18 NOTE — PROGRESS NOTE ADULT - SUBJECTIVE AND OBJECTIVE BOX
Patient is a 85y old  Male who presents with a chief complaint of right hip fx (18 Oct 2020 10:59)      HPI:  85M otherwise healthy presents to the ED for right hip pain following a mechanical fall. Pt was sitting down in a chair when he missed the chair falling onto his right hip, he experienced immediate right hip pain and swelling. He denies hit to the head, LOC, any other orthopedic injuries. Pt denies SOB, CP, calf pain. (05 Oct 2020 16:02)      INTERVAL HPI/OVERNIGHT EVENTS: Patient seen earlier today  Blood tinged NGT out put. 1500/ night. No abdominal pain, N/V. BM (+) Flatus (+)    MEDICATIONS  (STANDING):  ascorbic acid 500 milliGRAM(s) Oral two times a day  dextrose 5% + sodium chloride 0.45% with potassium chloride 20 mEq/L 1000 milliLiter(s) (63 mL/Hr) IV Continuous <Continuous>  folic acid 1 milliGRAM(s) Oral daily  multivitamin 1 Tablet(s) Oral daily  pantoprazole  Injectable 40 milliGRAM(s) IV Push every 12 hours    MEDICATIONS  (PRN):  acetaminophen   Tablet .. 975 milliGRAM(s) Oral every 8 hours PRN Moderate Pain (4 - 6)  benzocaine 15 mG/menthol 3.6 mG (Sugar-Free) Lozenge 1 Lozenge Oral every 8 hours PRN Sore Throat  calcium carbonate    500 mG (Tums) Chewable 2 Tablet(s) Chew every 4 hours PRN Heartburn  guaiFENesin   Syrup  (Sugar-Free) 200 milliGRAM(s) Oral every 6 hours PRN Cough  melatonin 3 milliGRAM(s) Oral at bedtime PRN Sleep  ondansetron Injectable 4 milliGRAM(s) IV Push every 6 hours PRN Nausea and/or Vomiting  traMADol 25 milliGRAM(s) Oral every 6 hours PRN Moderate Pain (4 - 6)  traMADol 50 milliGRAM(s) Oral every 6 hours PRN Severe Pain (7 - 10)      FAMILY HISTORY:      Allergies    No Known Allergies    Intolerances        PMH/PSH:  No pertinent past medical history    No significant past surgical history          REVIEW OF SYSTEMS:  CONSTITUTIONAL: No fever, weight loss,   EYES: No eye pain, visual disturbances, or discharge  ENMT:  No difficulty hearing, tinnitus, vertigo; No sinus or throat pain  NECK: No pain or stiffness  BREASTS: No pain, masses, or nipple discharge  RESPIRATORY: No cough, wheezing, chills or hemoptysis; No shortness of breath  CARDIOVASCULAR: No chest pain, palpitations, dizziness, or leg swelling  GASTROINTESTINAL: See above  GENITOURINARY: No dysuria, frequency, hematuria, or incontinence  NEUROLOGICAL: No headaches, memory loss, loss of strength, numbness, or tremors  SKIN: No itching, burning, rashes, or lesions   LYMPH NODES: No enlarged glands  ENDOCRINE: No heat or cold intolerance; No hair loss  MUSCULOSKELETAL: No joint pain or swelling; No muscle, back, or extremity pain  PSYCHIATRIC: No depression, anxiety, mood swings, or difficulty sleeping  HEME/LYMPH: No easy bruising, or bleeding gums  ALLERGY AND IMMUNOLOGIC: No hives or eczema    Vital Signs Last 24 Hrs  T(C): 36.7 (18 Oct 2020 17:17), Max: 36.8 (18 Oct 2020 12:00)  T(F): 98.1 (18 Oct 2020 17:17), Max: 98.2 (18 Oct 2020 12:00)  HR: 91 (18 Oct 2020 17:17) (90 - 98)  BP: 97/68 (18 Oct 2020 17:17) (97/68 - 104/63)  BP(mean): --  RR: 18 (18 Oct 2020 17:17) (18 - 18)  SpO2: 98% (18 Oct 2020 17:17) (96% - 98%)    PHYSICAL EXAM:  GENERAL: NAD, well-groomed, well-developed  HEAD:  Atraumatic, Normocephalic  EYES: EOMI, PERRLA, conjunctiva and sclera clear  NECK: Supple, No JVD, Normal thyroid  NERVOUS SYSTEM:  Alert & Oriented X3, Good concentration; Motor Strength 5/5 B/L upper and lower extremities;   CHEST/LUNG: Clear to percussion bilaterally; No rales, rhonchi, wheezing, or rubs  HEART: Regular rate and rhythm; No murmurs, rubs, or gallops  ABDOMEN: Soft, Nontender, Nondistended; Bowel sounds present  EXTREMITIES:  2+ Peripheral Pulses, No clubbing, cyanosis, or edema  LYMPH: No lymphadenopathy noted  SKIN: No rashes or lesions    LAB                          10.2   10.68 )-----------( 200      ( 18 Oct 2020 07:03 )             33.3       CBC:  10-18 @ 07:03  WBC 10.68   Hgb 10.2   Hct 33.3   Plts 200  .9  10-17 @ 06:51  WBC 7.67   Hgb 9.8   Hct 30.3   Plts 183  MCV 98.4  10-16 @ 07:30  WBC 8.12   Hgb 8.9   Hct 27.2   Plts 188  MCV 96.8  10-15 @ 06:36  WBC 8.41   Hgb 10.1   Hct 31.6   Plts 257  MCV 99.1  10-14 @ 07:07  WBC 7.80   Hgb 9.7   Hct 30.4   Plts 273  MCV 98.7  10-13 @ 06:28  WBC 9.31   Hgb 8.7   Hct 27.2   Plts 255  MCV 98.2  10-12 @ 06:37  WBC 13.31   Hgb 9.8   Hct 29.5   Plts 211  MCV 97.0      Chemistry:  10-18 @ 07:03  Na+ 140  K+ 3.3  Cl- 103  CO2 33  BUN 24  Cr 0.95     10-17 @ 06:50  Na+ 140  K+ 3.8  Cl- 102  CO2 33  BUN 24  Cr 0.85     10-16 @ 07:30  Na+ 139  K+ 3.1  Cl- 104  CO2 32  BUN 25  Cr 0.86     10-15 @ 06:36  Na+ 138  K+ 3.2  Cl- 101  CO2 30  BUN 22  Cr 0.86     10-14 @ 07:07  Na+ 139  K+ 3.4  Cl- 99  CO2 33  BUN 26  Cr 0.81     10-13 @ 06:28  Na+ 138  K+ 3.3  Cl- 100  CO2 32  BUN 28  Cr 0.76     10-12 @ 06:37  Na+ 136  K+ 4.2  Cl- 97  CO2 33  BUN 37  Cr 1.01         Glucose, Serum: 88 mg/dL (10-18 @ 07:03)  Glucose, Serum: 92 mg/dL (10-17 @ 06:50)  Glucose, Serum: 102 mg/dL (10-16 @ 07:30)  Glucose, Serum: 94 mg/dL (10-15 @ 06:36)  Glucose, Serum: 99 mg/dL (10-14 @ 07:07)  Glucose, Serum: 92 mg/dL (10-13 @ 06:28)  Glucose, Serum: 131 mg/dL (10-12 @ 06:37)      18 Oct 2020 07:03    140    |  103    |  24     ----------------------------<  88     3.3     |  33     |  0.95   17 Oct 2020 06:50    140    |  102    |  24     ----------------------------<  92     3.8     |  33     |  0.85   16 Oct 2020 07:30    139    |  104    |  25     ----------------------------<  102    3.1     |  32     |  0.86   15 Oct 2020 06:36    138    |  101    |  22     ----------------------------<  94     3.2     |  30     |  0.86   14 Oct 2020 07:07    139    |  99     |  26     ----------------------------<  99     3.4     |  33     |  0.81   13 Oct 2020 06:28    138    |  100    |  28     ----------------------------<  92     3.3     |  32     |  0.76   12 Oct 2020 06:37    136    |  97     |  37     ----------------------------<  131    4.2     |  33     |  1.01     Ca    8.7        18 Oct 2020 07:03  Ca    8.8        17 Oct 2020 06:50  Ca    8.8        16 Oct 2020 07:30  Ca    8.5        15 Oct 2020 06:36  Ca    8.9        14 Oct 2020 07:07  Ca    8.7        13 Oct 2020 06:28  Ca    8.7        12 Oct 2020 06:37  Phos  3.1       18 Oct 2020 07:03  Phos  2.7       16 Oct 2020 07:30  Phos  2.9       15 Oct 2020 06:38  Phos  3.2       13 Oct 2020 06:28  Mg     2.3       18 Oct 2020 07:03  Mg     1.9       16 Oct 2020 07:30  Mg     2.1       15 Oct 2020 06:38  Mg     2.3       13 Oct 2020 06:28                CAPILLARY BLOOD GLUCOSE              RADIOLOGY & ADDITIONAL TESTS:    Imaging Personally Reviewed:  [ ] YES  [ ] NO    Consultant(s) Notes Reviewed:  [ ] YES  [ ] NO    Care Discussed with Consultants/Other Providers [ ] YES  [ ] NO

## 2020-10-19 ENCOUNTER — RESULT REVIEW (OUTPATIENT)
Age: 85
End: 2020-10-19

## 2020-10-19 LAB
ANION GAP SERPL CALC-SCNC: 5 MMOL/L — SIGNIFICANT CHANGE UP (ref 5–17)
BUN SERPL-MCNC: 24 MG/DL — HIGH (ref 7–23)
CALCIUM SERPL-MCNC: 8.4 MG/DL — LOW (ref 8.5–10.1)
CHLORIDE SERPL-SCNC: 106 MMOL/L — SIGNIFICANT CHANGE UP (ref 96–108)
CO2 SERPL-SCNC: 29 MMOL/L — SIGNIFICANT CHANGE UP (ref 22–31)
CREAT SERPL-MCNC: 0.84 MG/DL — SIGNIFICANT CHANGE UP (ref 0.5–1.3)
GLUCOSE SERPL-MCNC: 83 MG/DL — SIGNIFICANT CHANGE UP (ref 70–99)
HCT VFR BLD CALC: 30.6 % — LOW (ref 39–50)
HGB BLD-MCNC: 9.5 G/DL — LOW (ref 13–17)
MAGNESIUM SERPL-MCNC: 2.4 MG/DL — SIGNIFICANT CHANGE UP (ref 1.6–2.6)
MCHC RBC-ENTMCNC: 31 GM/DL — LOW (ref 32–36)
MCHC RBC-ENTMCNC: 31.1 PG — SIGNIFICANT CHANGE UP (ref 27–34)
MCV RBC AUTO: 100.3 FL — HIGH (ref 80–100)
NRBC # BLD: 0 /100 WBCS — SIGNIFICANT CHANGE UP (ref 0–0)
PHOSPHATE SERPL-MCNC: 3 MG/DL — SIGNIFICANT CHANGE UP (ref 2.5–4.5)
PLATELET # BLD AUTO: 174 K/UL — SIGNIFICANT CHANGE UP (ref 150–400)
POTASSIUM SERPL-MCNC: 3.5 MMOL/L — SIGNIFICANT CHANGE UP (ref 3.5–5.3)
POTASSIUM SERPL-SCNC: 3.5 MMOL/L — SIGNIFICANT CHANGE UP (ref 3.5–5.3)
RBC # BLD: 3.05 M/UL — LOW (ref 4.2–5.8)
RBC # FLD: 15.1 % — HIGH (ref 10.3–14.5)
SODIUM SERPL-SCNC: 140 MMOL/L — SIGNIFICANT CHANGE UP (ref 135–145)
WBC # BLD: 5.99 K/UL — SIGNIFICANT CHANGE UP (ref 3.8–10.5)
WBC # FLD AUTO: 5.99 K/UL — SIGNIFICANT CHANGE UP (ref 3.8–10.5)

## 2020-10-19 PROCEDURE — 99232 SBSQ HOSP IP/OBS MODERATE 35: CPT

## 2020-10-19 PROCEDURE — 88305 TISSUE EXAM BY PATHOLOGIST: CPT | Mod: 26

## 2020-10-19 PROCEDURE — 88312 SPECIAL STAINS GROUP 1: CPT | Mod: 26

## 2020-10-19 RX ORDER — ONDANSETRON 8 MG/1
4 TABLET, FILM COATED ORAL ONCE
Refills: 0 | Status: DISCONTINUED | OUTPATIENT
Start: 2020-10-19 | End: 2020-10-19

## 2020-10-19 RX ORDER — METOCLOPRAMIDE HCL 10 MG
10 TABLET ORAL THREE TIMES A DAY
Refills: 0 | Status: DISCONTINUED | OUTPATIENT
Start: 2020-10-19 | End: 2020-10-23

## 2020-10-19 RX ADMIN — PANTOPRAZOLE SODIUM 40 MILLIGRAM(S): 20 TABLET, DELAYED RELEASE ORAL at 07:10

## 2020-10-19 RX ADMIN — Medication 10 MILLIGRAM(S): at 22:19

## 2020-10-19 RX ADMIN — PANTOPRAZOLE SODIUM 40 MILLIGRAM(S): 20 TABLET, DELAYED RELEASE ORAL at 17:07

## 2020-10-19 RX ADMIN — Medication 500 MILLIGRAM(S): at 17:07

## 2020-10-19 RX ADMIN — Medication 3 MILLIGRAM(S): at 22:19

## 2020-10-19 RX ADMIN — DEXTROSE MONOHYDRATE, SODIUM CHLORIDE, AND POTASSIUM CHLORIDE 63 MILLILITER(S): 50; .745; 4.5 INJECTION, SOLUTION INTRAVENOUS at 07:11

## 2020-10-19 RX ADMIN — Medication 10 MILLIGRAM(S): at 15:07

## 2020-10-19 RX ADMIN — DEXTROSE MONOHYDRATE, SODIUM CHLORIDE, AND POTASSIUM CHLORIDE 63 MILLILITER(S): 50; .745; 4.5 INJECTION, SOLUTION INTRAVENOUS at 17:21

## 2020-10-19 NOTE — PROGRESS NOTE ADULT - ASSESSMENT
86 y/o male with acute hip fracture and Afib.      illius vs gastric outlet obstruction  gastritis NGT out will try clears     -Routine post surgical ortho care. pain controlled   -Staples to be removed POD 14  Ortho stable for DC, Outpatient follow up    -hypokalemia replaced o IVF with 20K     UTI   UA ++ LE and nitrite but minimal wbc and epithelial cells. ucx neg. remains afebrile   afebriles/p three day course of levaquin     acute blood loss anemia. 2/2 surgery   - cbc stable   - trend cbc. no bloody stool      Afib  -Echo reviewed  - Pt  agreed to AC so would recommend a NOAC. He reports only falling this one time. understands risk of falling on AC   - restart  xarelto 20 mg daily when able. lovenox for now as he is npo     vomitng   -2/2 post op ileus vs gastric outlet syndrome  Had large bm  and passing gas but was vomiting even while NPO so NG was placed and filled 3L.  outputn remains high abdomen remains soft nt. ++BS.  GI consult appreciated KUB still with ilius?  aspiration PNA   - ct shows lower lobe opacities and wbc stable and afebrile. procalcitonin normal off Levaquin   renal mass   - incidentally found on ct  - needs follow up with urologist   - pt aware but will remind the pt prior to dc     dispo to [facility] when clinically stable

## 2020-10-19 NOTE — PRE-OP CHECKLIST - WEIGHT IN LBS
After Visit Summary   5/21/2018    Jose Luis Parker    MRN: [id number]           Patient Information     Date Of Birth          1949        Visit Information        Provider Department      5/21/2018 11:30 AM Kwan Esposito MD [facility]        Today's Diagnoses     Chronic myeloproliferative disorder (H)    -  1    Personal history of venous thrombosis and embolism           Follow-ups after your visit        Your next 10 appointments already scheduled     Jun 12, 2018 10:45 AM CDT   [facility]    [address] [facility] [id number] [phone]           Please do not eat 10-12 hours before your appointment if you are coming in fasting for labs on lipids, cholesterol, or glucose (sugar). This does not apply to pregnant women. Water, hot tea and black coffee (with nothing added) are okay. Do not drink other fluids, diet soda or chew gum.            Sep 04, 2018  1:00 PM CDT   RETURN CLOTTING DISORDER with Kwan Esposito MD   [facility] ([facility])    [facility] [address] [address] [phone]              Who to contact     If you have questions or need follow up information about today's clinic visit or your schedule please contact [facility] directly at [phone].  Normal or non-critical lab and imaging results will be communicated to you by MyChart, letter or phone within 4 business days after the clinic has received the results. If you do not hear from us within 7 days, please contact the clinic through MyChart or phone. If you have a critical or abnormal lab result, we will notify you by phone as soon as possible.  Submit refill requests through [facility] or call your pharmacy and they will forward the refill request to us. Please allow 3 
"business days for your refill to be completed.          Additional Information About Your Visit        MyChart Information     [employer] lets you send messages to your doctor, view your test results, renew your prescriptions, schedule appointments and more. To sign up, go to [email or web address] . Click on \"Log in\" on the left side of the screen, which will take you to the Welcome page. Then click on \"Sign up Now\" on the right side of the page.     You will be asked to enter the access code listed below, as well as some personal information. Please follow the directions to create your username and password.     Your access code is: [email or web address]  Expires: 2018  3:27 PM     Your access code will  in 90 days. If you need help or a new code, please call your [place] clinic or [phone].        Care EveryWhere ID     This is your Care EveryWhere ID. This could be used by other organizations to access your [place] medical records  [id number]        Your Vitals Were     Pulse Temperature Respirations Height Pulse Oximetry BMI (Body Mass Index)    76 97.4  F (36.3  C) (Oral) 12 1.753 m (5' 9\") 98% 23.3 kg/m2       Blood Pressure from Last 3 Encounters:   18 136/77   18 133/81    Weight from Last 3 Encounters:   18 71.6 kg (157 lb 12.8 oz)   18 69.8 kg (153 lb 14.4 oz)              Today, you had the following     No orders found for display       Primary Care Provider Office Phone # Fax #    Vivek Tamia [phone]       [facility] [address] [place] [address]        Equal Access to Services     DEBRA LUCIA : Isha Hunter, pepe skelton, hetal kaalmada octavia, krissy proctor. So [facility] [phone].    ATENCIÓN: Si habla español, tiene a gonzales disposición servicios gratuitos de asistencia lingüística. Llame al [phone].    We comply with applicable federal civil rights laws and [place] "
laws. We do not discriminate on the basis of race, color, national origin, age, disability, sex, sexual orientation, or gender identity.            Thank you!     Thank you for choosing [facility]  for your care. Our goal is always to provide you with excellent care. Hearing back from our patients is one way we can continue to improve our services. Please take a few minutes to complete the written survey that you may receive in the mail after your visit with us. Thank you!             Your Updated Medication List - Protect others around you: Learn how to safely use, store and throw away your medicines at www.disposemymeds.org.          This list is accurate as of 5/21/18 11:59 PM.  Always use your most recent med list.                   Brand Name Dispense Instructions for use Diagnosis    ELIQUIS PO      Take 5 mg by mouth every 12 hours    Chronic myeloproliferative disorder (H), Personal history of venous thrombosis and embolism       MULTIVITAMINS PO      Take 1 tablet by mouth daily        tamsulosin 0.4 MG capsule    FLOMAX     Take 0.4 mg by mouth as needed          
217.8
217.8

## 2020-10-19 NOTE — PROGRESS NOTE ADULT - SUBJECTIVE AND OBJECTIVE BOX
HPI:  85M otherwise healthy presents to the ED for right hip pain following a mechanical fall. Pt was sitting down in a chair when he missed the chair falling onto his right hip, he experienced immediate right hip pain and swelling. He denies hit to the head, LOC, any other orthopedic injuries. Pt denies SOB, CP, calf pain. (05 Oct 2020 16:02)    Patient is a 85y old  Male who presents with a chief complaint of right hip fx (19 Oct 2020 14:17)      INTERVAL HPI/OVERNIGHT EVENTS: no acute events s/p EGD todday     MEDICATIONS  (STANDING):  ascorbic acid 500 milliGRAM(s) Oral two times a day  dextrose 5% + sodium chloride 0.45% with potassium chloride 20 mEq/L 1000 milliLiter(s) (63 mL/Hr) IV Continuous <Continuous>  folic acid 1 milliGRAM(s) Oral daily  metoclopramide Injectable 10 milliGRAM(s) IV Push three times a day  multivitamin 1 Tablet(s) Oral daily  pantoprazole  Injectable 40 milliGRAM(s) IV Push every 12 hours    MEDICATIONS  (PRN):  acetaminophen   Tablet .. 975 milliGRAM(s) Oral every 8 hours PRN Moderate Pain (4 - 6)  benzocaine 15 mG/menthol 3.6 mG (Sugar-Free) Lozenge 1 Lozenge Oral every 8 hours PRN Sore Throat  calcium carbonate    500 mG (Tums) Chewable 2 Tablet(s) Chew every 4 hours PRN Heartburn  guaiFENesin   Syrup  (Sugar-Free) 200 milliGRAM(s) Oral every 6 hours PRN Cough  melatonin 3 milliGRAM(s) Oral at bedtime PRN Sleep  ondansetron Injectable 4 milliGRAM(s) IV Push every 6 hours PRN Nausea and/or Vomiting  traMADol 25 milliGRAM(s) Oral every 6 hours PRN Moderate Pain (4 - 6)  traMADol 50 milliGRAM(s) Oral every 6 hours PRN Severe Pain (7 - 10)      Allergies    No Known Allergies    Intolerances        REVIEW OF SYSTEMS:  CONSTITUTIONAL: No fever, weight loss, or fatigue  EYES: No eye pain, visual disturbances, or discharge  ENMT:  No difficulty hearing, tinnitus, vertigo; No sinus or throat pain  NECK: No pain or stiffness  BREASTS: No pain, masses, or nipple discharge  RESPIRATORY: No cough, wheezing, chills or hemoptysis; No shortness of breath  CARDIOVASCULAR: No chest pain, palpitations, dizziness, or leg swelling  GASTROINTESTINAL: No abdominal or epigastric pain. No nausea, vomiting, or hematemesis; No diarrhea or constipation. No melena or hematochezia.  GENITOURINARY: No dysuria, frequency, hematuria, or incontinence  NEUROLOGICAL: No headaches, memory loss, loss of strength, numbness, or tremors  SKIN: No itching, burning, rashes, or lesions   LYMPH NODES: No enlarged glands  ENDOCRINE: No heat or cold intolerance; No hair loss  MUSCULOSKELETAL:hip pain   PSYCHIATRIC: No depression, anxiety, mood swings, or difficulty sleeping  HEME/LYMPH: No easy bruising, or bleeding gums  ALLERGY AND IMMUNOLOGIC: No hives or eczema    Vital Signs Last 24 Hrs  T(C): 36.5 (19 Oct 2020 15:39), Max: 37.1 (19 Oct 2020 14:20)  T(F): 97.7 (19 Oct 2020 15:39), Max: 98.8 (19 Oct 2020 14:20)  HR: 92 (19 Oct 2020 15:39) (64 - 99)  BP: 121/67 (19 Oct 2020 15:39) (85/58 - 166/60)  RR: 18 (19 Oct 2020 15:39) (16 - 21)  SpO2: 98% (19 Oct 2020 15:39) (95% - 100%)    PHYSICAL EXAM:  GENERAL: NAD, well-groomed, well-developed  HEAD:  Atraumatic, Normocephalic  EYES: EOMI, PERRLA, conjunctiva and sclera clear  ENMT: No tonsillar erythema, exudates, or enlargement; Moist mucous membranes, Good dentition, No lesions  NECK: Supple, No JVD, Normal thyroid  NERVOUS SYSTEM:  Alert & Oriented X3, Good concentration; Motor Strength 5/5 B/L upper and lower extremities; DTRs 2+ intact and symmetric  CHEST/LUNG: Clear to percussion bilaterally; No rales, rhonchi, wheezing, or rubs  HEART: Regular rate and rhythm; No murmurs, rubs, or gallops  ABDOMEN: Soft, Nontender, Nondistended; Bowel sounds present  EXTREMITIES:  2+ Peripheral Pulses, No clubbing, cyanosis, or edema  LYMPH: No lymphadenopathy noted  SKIN: No rashes or lesions    LABS:                        9.5    5.99  )-----------( 174      ( 19 Oct 2020 06:40 )             30.6     10-19    140  |  106  |  24<H>  ----------------------------<  83  3.5   |  29  |  0.84    Ca    8.4<L>      19 Oct 2020 06:40  Phos  3.0     10-19  Mg     2.4     10-19          CAPILLARY BLOOD GLUCOSE          RADIOLOGY & ADDITIONAL TESTS:    Imaging Personally Reviewed:  [ ] YES  [ ] NO    Consultant(s) Notes Reviewed:  [ ] YES  [ ] NO    Care Discussed with Consultants/Other Providers [ ] YES  [ ] NO

## 2020-10-19 NOTE — PRE-OP CHECKLIST - SELECT TESTS ORDERED
CMP/Spirometry/Results in MD note/COVID/CBC/INR/PT/PTT/Urinalysis/EKG/BMP
CBC/PT/PTT/INR/urine culture results pending/BMP/POCT Blood Glucose/Type and Screen/COVID/Type and Cross

## 2020-10-19 NOTE — BRIEF OPERATIVE NOTE - NSICDXBRIEFPREOP_GEN_ALL_CORE_FT
PRE-OP DIAGNOSIS:  Obstructed, gastric outlet 19-Oct-2020 14:23:30  Chaitanya Simmons  
PRE-OP DIAGNOSIS:  Subtrochanteric fracture of right femur 06-Oct-2020 16:25:49  Richard Kay

## 2020-10-19 NOTE — PROGRESS NOTE ADULT - SUBJECTIVE AND OBJECTIVE BOX
Procedure:           Upper GI endoscopy with biopsy   10-19-20 @ 14:19    Indications:                 Gastric outlet obstruction    Monitored Anesthesia Care Provided by :     ____________________________________________________________________________________________________  Procedure:           Pre-Anesthesia Assessment:                       - Prior to the procedure, a History and Physical was performed, and patient                        medications and allergies were reviewed. The patient is competent. The risks                        and benefits of the procedure and the sedation options and risks were                        discussed with the patient. All questions were answered and informed consent                        was obtained. Patient identification and proposed procedure were verified by                        the physician, the nurse and the anesthesiologist in the procedure room.                        Mental Status Examination:    alert and oriented. Airway Examination: normal                        oropharyngeal airway and neck mobility. Respiratory Examination: clear to                        auscultation. CV Examination: normal. Prophylactic Antibiotics:  The patient                        does not require prophylactic antibiotics.                        Grade Assessment: . After                        reviewing the risks and benefits, the patient was deemed in satisfactory                        condition to undergo the procedure. The anesthesia plan was to use monitored                        anesthesia care (MAC). Immediately prior to administration of medications,                        the patient was re-assessed for adequacy to receive sedatives. The heart        		     rate, respiratory rate, oxygen saturations, blood pressure, adequacy of                        pulmonary ventilation, and response to care were monitored throughout the                        procedure. The physical status of the patient was re-assessed after the                        procedure.                       After obtaining informed consent, the endoscope was passed under direct                        vision. Throughout the procedure, the patient's blood pressure, pulse, and                        oxygen saturations were monitored continuously. The Endoscope was introduced                        through the mouth, and advanced to the second part of duodenum. Retroflexion was done the stomach . The upper GI                        endoscopy was accomplished with ease. The patient tolerated the procedure                        well.    ESOPHAGUS:   3 linear erosions in distal esophagus  s/p biopsy    STOMACH:   mild antral gastritis s/p biopsy  ,  mucosa of pre pylorus appeared twisted on itself    DUODENUM:   WNL      Assessment :   As above    PLAN :   Clear liquid diet, D/C NGT , Reglan 5 TID IV, check histology

## 2020-10-19 NOTE — BRIEF OPERATIVE NOTE - NSICDXBRIEFPROCEDURE_GEN_ALL_CORE_FT
PROCEDURES:  EGD with biopsy 19-Oct-2020 14:23:19  Chaitanya Simmons  
PROCEDURES:  Intramedullary nailing of femur 06-Oct-2020 16:25:10  Richard Kay

## 2020-10-20 LAB
ALBUMIN SERPL ELPH-MCNC: 2.6 G/DL — LOW (ref 3.3–5)
ALP SERPL-CCNC: 105 U/L — SIGNIFICANT CHANGE UP (ref 40–120)
ALT FLD-CCNC: 33 U/L — SIGNIFICANT CHANGE UP (ref 12–78)
ANION GAP SERPL CALC-SCNC: 5 MMOL/L — SIGNIFICANT CHANGE UP (ref 5–17)
AST SERPL-CCNC: 39 U/L — HIGH (ref 15–37)
BILIRUB SERPL-MCNC: 2.2 MG/DL — HIGH (ref 0.2–1.2)
BUN SERPL-MCNC: 28 MG/DL — HIGH (ref 7–23)
CALCIUM SERPL-MCNC: 8.9 MG/DL — SIGNIFICANT CHANGE UP (ref 8.5–10.1)
CHLORIDE SERPL-SCNC: 105 MMOL/L — SIGNIFICANT CHANGE UP (ref 96–108)
CO2 SERPL-SCNC: 28 MMOL/L — SIGNIFICANT CHANGE UP (ref 22–31)
CREAT SERPL-MCNC: 0.93 MG/DL — SIGNIFICANT CHANGE UP (ref 0.5–1.3)
GLUCOSE SERPL-MCNC: 90 MG/DL — SIGNIFICANT CHANGE UP (ref 70–99)
HCT VFR BLD CALC: 31.1 % — LOW (ref 39–50)
HGB BLD-MCNC: 9.8 G/DL — LOW (ref 13–17)
MAGNESIUM SERPL-MCNC: 2 MG/DL — SIGNIFICANT CHANGE UP (ref 1.6–2.6)
MCHC RBC-ENTMCNC: 31.5 GM/DL — LOW (ref 32–36)
MCHC RBC-ENTMCNC: 31.6 PG — SIGNIFICANT CHANGE UP (ref 27–34)
MCV RBC AUTO: 100.3 FL — HIGH (ref 80–100)
NRBC # BLD: 0 /100 WBCS — SIGNIFICANT CHANGE UP (ref 0–0)
PHOSPHATE SERPL-MCNC: 3.4 MG/DL — SIGNIFICANT CHANGE UP (ref 2.5–4.5)
PLATELET # BLD AUTO: 208 K/UL — SIGNIFICANT CHANGE UP (ref 150–400)
POTASSIUM SERPL-MCNC: 4 MMOL/L — SIGNIFICANT CHANGE UP (ref 3.5–5.3)
POTASSIUM SERPL-SCNC: 4 MMOL/L — SIGNIFICANT CHANGE UP (ref 3.5–5.3)
PROT SERPL-MCNC: 6 GM/DL — SIGNIFICANT CHANGE UP (ref 6–8.3)
RBC # BLD: 3.1 M/UL — LOW (ref 4.2–5.8)
RBC # FLD: 15 % — HIGH (ref 10.3–14.5)
SODIUM SERPL-SCNC: 138 MMOL/L — SIGNIFICANT CHANGE UP (ref 135–145)
SURGICAL PATHOLOGY STUDY: SIGNIFICANT CHANGE UP
WBC # BLD: 7.03 K/UL — SIGNIFICANT CHANGE UP (ref 3.8–10.5)
WBC # FLD AUTO: 7.03 K/UL — SIGNIFICANT CHANGE UP (ref 3.8–10.5)

## 2020-10-20 PROCEDURE — 99232 SBSQ HOSP IP/OBS MODERATE 35: CPT

## 2020-10-20 PROCEDURE — 73502 X-RAY EXAM HIP UNI 2-3 VIEWS: CPT | Mod: 26,RT

## 2020-10-20 RX ADMIN — PANTOPRAZOLE SODIUM 40 MILLIGRAM(S): 20 TABLET, DELAYED RELEASE ORAL at 05:31

## 2020-10-20 RX ADMIN — Medication 500 MILLIGRAM(S): at 05:32

## 2020-10-20 RX ADMIN — Medication 500 MILLIGRAM(S): at 17:46

## 2020-10-20 RX ADMIN — Medication 975 MILLIGRAM(S): at 01:06

## 2020-10-20 RX ADMIN — Medication 975 MILLIGRAM(S): at 00:06

## 2020-10-20 RX ADMIN — Medication 1 TABLET(S): at 12:30

## 2020-10-20 RX ADMIN — Medication 10 MILLIGRAM(S): at 22:08

## 2020-10-20 RX ADMIN — Medication 10 MILLIGRAM(S): at 05:31

## 2020-10-20 RX ADMIN — Medication 10 MILLIGRAM(S): at 13:56

## 2020-10-20 RX ADMIN — PANTOPRAZOLE SODIUM 40 MILLIGRAM(S): 20 TABLET, DELAYED RELEASE ORAL at 17:46

## 2020-10-20 RX ADMIN — Medication 975 MILLIGRAM(S): at 13:52

## 2020-10-20 RX ADMIN — Medication 975 MILLIGRAM(S): at 14:15

## 2020-10-20 RX ADMIN — Medication 1 MILLIGRAM(S): at 12:29

## 2020-10-20 NOTE — PROGRESS NOTE ADULT - SUBJECTIVE AND OBJECTIVE BOX
HPI:  85M otherwise healthy presents to the ED for right hip pain following a mechanical fall. Pt was sitting down in a chair when he missed the chair falling onto his right hip, he experienced immediate right hip pain and swelling. He denies hit to the head, LOC, any other orthopedic injuries. Pt denies SOB, CP, calf pain. (05 Oct 2020 16:02)    Patient is a 85y old  Male who presents with a chief complaint of right hip fx (20 Oct 2020 14:44)      INTERVAL HPI/OVERNIGHT EVENTS: no acute events overnight doing well     MEDICATIONS  (STANDING):  ascorbic acid 500 milliGRAM(s) Oral two times a day  dextrose 5% + sodium chloride 0.45% with potassium chloride 20 mEq/L 1000 milliLiter(s) (63 mL/Hr) IV Continuous <Continuous>  folic acid 1 milliGRAM(s) Oral daily  metoclopramide Injectable 10 milliGRAM(s) IV Push three times a day  multivitamin 1 Tablet(s) Oral daily  pantoprazole  Injectable 40 milliGRAM(s) IV Push every 12 hours    MEDICATIONS  (PRN):  acetaminophen   Tablet .. 975 milliGRAM(s) Oral every 8 hours PRN Moderate Pain (4 - 6)  benzocaine 15 mG/menthol 3.6 mG (Sugar-Free) Lozenge 1 Lozenge Oral every 8 hours PRN Sore Throat  calcium carbonate    500 mG (Tums) Chewable 2 Tablet(s) Chew every 4 hours PRN Heartburn  guaiFENesin   Syrup  (Sugar-Free) 200 milliGRAM(s) Oral every 6 hours PRN Cough  melatonin 3 milliGRAM(s) Oral at bedtime PRN Sleep  ondansetron Injectable 4 milliGRAM(s) IV Push every 6 hours PRN Nausea and/or Vomiting      Allergies    No Known Allergies    Intolerances        REVIEW OF SYSTEMS:  CONSTITUTIONAL: No fever, weight loss, or fatigue  EYES: No eye pain, visual disturbances, or discharge  ENMT:  No difficulty hearing, tinnitus, vertigo; No sinus or throat pain  NECK: No pain or stiffness  BREASTS: No pain, masses, or nipple discharge  RESPIRATORY: No cough, wheezing, chills or hemoptysis; No shortness of breath  CARDIOVASCULAR: No chest pain, palpitations, dizziness, or leg swelling  GASTROINTESTINAL: No abdominal or epigastric pain. No nausea, vomiting, or hematemesis; No diarrhea or constipation. No melena or hematochezia.  GENITOURINARY: No dysuria, frequency, hematuria, or incontinence  NEUROLOGICAL: No headaches, memory loss, loss of strength, numbness, or tremors  SKIN: No itching, burning, rashes, or lesions   LYMPH NODES: No enlarged glands  ENDOCRINE: No heat or cold intolerance; No hair loss  MUSCULOSKELETAL: some right hip pain   PSYCHIATRIC: No depression, anxiety, mood swings, or difficulty sleeping  HEME/LYMPH: No easy bruising, or bleeding gums  ALLERGY AND IMMUNOLOGIC: No hives or eczema    Vital Signs Last 24 Hrs  T(C): 36.6 (20 Oct 2020 17:17), Max: 36.9 (20 Oct 2020 11:23)  T(F): 97.8 (20 Oct 2020 17:17), Max: 98.4 (20 Oct 2020 11:23)  HR: 71 (20 Oct 2020 17:17) (71 - 94)  BP: 98/64 (20 Oct 2020 17:17) (98/64 - 109/67)  RR: 17 (20 Oct 2020 17:17) (16 - 18)  SpO2: 96% (20 Oct 2020 17:17) (94% - 100%)    PHYSICAL EXAM:  GENERAL: NAD, well-groomed, well-developed  HEAD:  Atraumatic, Normocephalic  EYES: EOMI, PERRLA, conjunctiva and sclera clear  ENMT: No tonsillar erythema, exudates, or enlargement; Moist mucous membranes, Good dentition, No lesions  NECK: Supple, No JVD, Normal thyroid  NERVOUS SYSTEM:  Alert & Oriented X3, Good concentration; Motor Strength 5/5 B/L upper and lower extremities; DTRs 2+ intact and symmetric  CHEST/LUNG: Clear to percussion bilaterally; No rales, rhonchi, wheezing, or rubs  HEART: Regular rate and rhythm; No murmurs, rubs, or gallops  ABDOMEN: Soft, Nontender, Nondistended; Bowel sounds present  EXTREMITIES:  right hip wound dressed   LYMPH: No lymphadenopathy noted  SKIN: No rashes or lesions    LABS:                        9.8    7.03  )-----------( 208      ( 20 Oct 2020 07:42 )             31.1     10-20    138  |  105  |  28<H>  ----------------------------<  90  4.0   |  28  |  0.93    Ca    8.9      20 Oct 2020 07:42  Phos  3.4     10-20  Mg     2.0     10-20    TPro  6.0  /  Alb  2.6<L>  /  TBili  2.2<H>  /  DBili  x   /  AST  39<H>  /  ALT  33  /  AlkPhos  105  10-20        CAPILLARY BLOOD GLUCOSE          RADIOLOGY & ADDITIONAL TESTS:  < from: Xray Hip 2-3 Views, Right (10.20.20 @ 12:58) >  XAM:  XR HIP 2-3V RT                            PROCEDURE DATE:  10/20/2020          INTERPRETATION:  Right hip fracture.    3 views right hip including femur.    Status post ORIF right proximal femoral fracture. Satisfactory gross alignment. Intact hardware. No new fracture compared to prior. Advanced degenerative change at the knee with chondrocalcinosis lateral meniscus. Moderate to marked narrowing right hip joint. Vascular calcifications. Surgical staples project laterally.    Impression:Status post ORIF right femur    < end of copied text >    Imaging Personally Reviewed:  [ X] YES  [ ] NO    Consultant(s) Notes Reviewed:  [X ] YES  [ ] NO    Care Discussed with Consultants/Other Providers [X ] YES  [ ] NO

## 2020-10-20 NOTE — PROGRESS NOTE ADULT - ASSESSMENT
HPI:  85M otherwise healthy presents to the ED for right hip pain following a mechanical fall. Pt was sitting down in a chair when he missed the chair falling onto his right hip, he experienced immediate right hip pain and swelling. He denies hit to the head, LOC, any other orthopedic injuries. Pt denies SOB, CP, calf pain. (05 Oct 2020 16:02)  ------------ As Above -------------  The patient presented with a mechanical fall and fractured his hip. S/P repair 10/6. Patient developed N/V on 10/10. NGT inserted this AM revealed 3 liters of fluid. CT Scan done afterwards revealed gastric distention > ileus.   Patient felt much better with NGT. No history of PUD. No GI symptoms on admission. On ASA 81 QD.  Prior to admission, the patient denies melena, hematochezia, hematemesis, nausea, vomiting, abdominal pain, constipation, diarrhea, or change in bowel movements Normal colonoscopy 2017.   Flatus but no BMs. Enema without any results.    Goo VS ileus-    Flatus (+), BM (-) . EGD essentially negative. Tolerating clear liquid diet  1) advance to full liquid diet  2) Continue IV Reglan 3) Lactulose 4)  f/u  KUB 5) Follow  electrolytes

## 2020-10-20 NOTE — CHART NOTE - NSCHARTNOTEFT_GEN_A_CORE
Pt adm w/ hip fracture & A - Fib ; Pt w/ Ileus vs. gastric outlet obstruction. S/P EGD. Post op day # 15. Acute blood loss anemia 2/2 to surgery ; UTI ; A-Fib ; NG tube removed 10/19.     Factors impacting intake: [ ] none [ ] nausea  [ ] vomiting [ ] diarrhea [ ] constipation  [ ]chewing problems [ ] swallowing issues  [x ] other: Pt w/ only top dentures.     Diet Prescription: Diet, Clear Liquid (10-19-20 @ 15:13)    Intake: 100 % clear liquids consumed. Pt Denies N/V/D/C/Chewing/Swallowing issues, No food allergies. Clear liquids began on 10/19.     Current Weight: Weight (kg): 10/20 - 217.5 (98.7 kg) Edema - 10/19 - 1+ (R) Hip, leg ; 10/15 - 221.1 (100.3 kg) Edema - 2+ (R) hip, leg  % Weight Change - 1.6 % /1.6 kg loss x 5 days.     Physical Appearance -10/19 - Edema - 1+ (R) hip, leg    Nutrition focused physical exam conducted ;   Subcutaneous fat loss: [severe ] Orbital fat pads region, [unable - no bottom dentures ]Buccal fat region, [ sevee]Triceps region,  [moderate ]Ribs region.  Muscle wasting: [severe ]Temples region, [severe ]Clavicle region, [severe ]Shoulder region, [severe ]Scapula region, [severe ]Interosseous region,  [swollen ]thigh region, [Boots ]Calf region      Pertinent Medications: MEDICATIONS  (STANDING):  ascorbic acid 500 milliGRAM(s) Oral two times a day  dextrose 5% + sodium chloride 0.45% with potassium chloride 20 mEq/L 1000 milliLiter(s) (63 mL/Hr) IV Continuous <Continuous>  folic acid 1 milliGRAM(s) Oral daily  metoclopramide Injectable 10 milliGRAM(s) IV Push three times a day  multivitamin 1 Tablet(s) Oral daily  pantoprazole  Injectable 40 milliGRAM(s) IV Push every 12 hours    MEDICATIONS  (PRN):  acetaminophen   Tablet .. 975 milliGRAM(s) Oral every 8 hours PRN Moderate Pain (4 - 6)  benzocaine 15 mG/menthol 3.6 mG (Sugar-Free) Lozenge 1 Lozenge Oral every 8 hours PRN Sore Throat  calcium carbonate    500 mG (Tums) Chewable 2 Tablet(s) Chew every 4 hours PRN Heartburn  guaiFENesin   Syrup  (Sugar-Free) 200 milliGRAM(s) Oral every 6 hours PRN Cough  melatonin 3 milliGRAM(s) Oral at bedtime PRN Sleep  ondansetron Injectable 4 milliGRAM(s) IV Push every 6 hours PRN Nausea and/or Vomiting    Pertinent Labs: 10-20 Na138 mmol/L Glu 90 mg/dL K+ 4.0 mmol/L Cr  0.93 mg/dL BUN 28 mg/dL<H> 10-20 Phos 3.4 mg/dL 10-20 Alb 2.6 g/dL<L>10-20 ALT 33 U/L AST 39 U/L<H> Alkaline Phosphatase 105 U/L     CAPILLARY BLOOD GLUCOSE        Skin: WDL    Estimated Needs:   [x ] no change since previous assessment (10/12/20)  [ ] recalculated:       Previous Nutrition Diagnosis:   Nutrition Diagnostic Terminology #1 Malnutrition...   Malnutrition Severe malnutrition in the context of chronic illness.   Etiology inadequate energy, protein intake in the presence of cardiac hx and self-imposed, excessive, unhealthy wt loss.   Signs/Symptoms physical findings of severe fat & muscle wasting noted.     Goal/Expected Outcome Pt will consume >75% nut'l needs via meals & supplement once diet is advanced, maintain wt +/- 5#. NOT MET      Nutrition Diagnosis is [x ] ongoing  [ ] resolved [ ] not applicable     New Nutrition Diagnosis: [x ] not applicable       Interventions:   Recommend  [x ] Change Diet To: full liquid w/ ensure enlive 8 oz twice per day (700 betty & 40 g pro) -> Soft w/ ensure enlive 8 oz twice per day (700 betty & 40 g pro)   [ ] Nutrition Supplement  [ ] Nutrition Support  [ ] Other:     Monitoring and Evaluation:   [x ] PO intake [ x ] Tolerance to diet prescription [ x ] weights [ x ] labs[ x ] follow up per protocol  [ ] other: Pt adm w/ hip fracture & A - Fib ; Pt w/ Ileus vs. gastric outlet obstruction. S/P EGD. Post op day # 15. Acute blood loss anemia 2/2 to surgery ; UTI ; A-Fib ; NG tube removed 10/19.     Factors impacting intake: [ ] none [ ] nausea  [ ] vomiting [ ] diarrhea [ ] constipation  [ ]chewing problems [ ] swallowing issues  [x ] other: Pt w/ only top dentures.     Diet Prescription: Diet, Clear Liquid (10-19-20 @ 15:13)    Intake: 100 % clear liquids consumed. Pt Denies N/V/D/C/Chewing/Swallowing issues, No food allergies. Clear liquids began on 10/19. Encouraged additional Ensure Clear x 2/day (provides 480 kcal, 16 g protein) between meals.     Current Weight: Weight (kg): 10/20 - 217.5 (98.7 kg) Edema - 10/19 - 1+ (R) Hip, leg ; 10/15 - 221.1 (100.3 kg) Edema - 2+ (R) hip, leg  % Weight Change - 1.6 % /1.6 kg loss x 5 days.     Physical Appearance -10/19 - Edema - 1+ (R) hip, leg    Nutrition focused physical exam conducted ;   Subcutaneous fat loss: [severe ] Orbital fat pads region, [unable - no bottom dentures ]Buccal fat region, [ sevee]Triceps region,  [moderate ]Ribs region.  Muscle wasting: [severe ]Temples region, [severe ]Clavicle region, [severe ]Shoulder region, [severe ]Scapula region, [severe ]Interosseous region,  [swollen ]thigh region, [Boots ]Calf region      Pertinent Medications: MEDICATIONS  (STANDING):  ascorbic acid 500 milliGRAM(s) Oral two times a day  dextrose 5% + sodium chloride 0.45% with potassium chloride 20 mEq/L 1000 milliLiter(s) (63 mL/Hr) IV Continuous <Continuous>  folic acid 1 milliGRAM(s) Oral daily  metoclopramide Injectable 10 milliGRAM(s) IV Push three times a day  multivitamin 1 Tablet(s) Oral daily  pantoprazole  Injectable 40 milliGRAM(s) IV Push every 12 hours    MEDICATIONS  (PRN):  acetaminophen   Tablet .. 975 milliGRAM(s) Oral every 8 hours PRN Moderate Pain (4 - 6)  benzocaine 15 mG/menthol 3.6 mG (Sugar-Free) Lozenge 1 Lozenge Oral every 8 hours PRN Sore Throat  calcium carbonate    500 mG (Tums) Chewable 2 Tablet(s) Chew every 4 hours PRN Heartburn  guaiFENesin   Syrup  (Sugar-Free) 200 milliGRAM(s) Oral every 6 hours PRN Cough  melatonin 3 milliGRAM(s) Oral at bedtime PRN Sleep  ondansetron Injectable 4 milliGRAM(s) IV Push every 6 hours PRN Nausea and/or Vomiting    Pertinent Labs: 10-20 Na138 mmol/L Glu 90 mg/dL K+ 4.0 mmol/L Cr  0.93 mg/dL BUN 28 mg/dL<H> 10-20 Phos 3.4 mg/dL 10-20 Alb 2.6 g/dL<L>10-20 ALT 33 U/L AST 39 U/L<H> Alkaline Phosphatase 105 U/L     CAPILLARY BLOOD GLUCOSE        Skin: WDL    Estimated Needs:   [x ] no change since previous assessment (10/12/20)  [ ] recalculated:       Previous Nutrition Diagnosis:   Nutrition Diagnostic Terminology #1 Malnutrition...   Malnutrition Severe malnutrition in the context of chronic illness.   Etiology inadequate energy, protein intake in the presence of cardiac hx and self-imposed, excessive, unhealthy wt loss.   Signs/Symptoms physical findings of severe fat & muscle wasting noted.     Goal/Expected Outcome Pt will consume >75% nut'l needs via meals & supplement once diet is advanced, maintain wt +/- 5#. NOT MET      Nutrition Diagnosis is [x ] ongoing  [ ] resolved [ ] not applicable     New Nutrition Diagnosis: [x ] not applicable       Interventions:   Recommend  [x ] Change Diet To: full liquid w/ ensure enlive 8 oz twice per day (700 betty & 40 g pro) -> Soft w/ ensure enlive 8 oz twice per day (700 betty & 40 g pro)   [ ] Nutrition Supplement  [ ] Nutrition Support  [ ] Other:     Monitoring and Evaluation:   [x ] PO intake [ x ] Tolerance to diet prescription [ x ] weights [ x ] labs[ x ] follow up per protocol  [ ] other:

## 2020-10-20 NOTE — PROGRESS NOTE ADULT - SUBJECTIVE AND OBJECTIVE BOX
Patient is a 85y old  Male who presents with a chief complaint of right hip fx (19 Oct 2020 16:26)      HPI:  85M otherwise healthy presents to the ED for right hip pain following a mechanical fall. Pt was sitting down in a chair when he missed the chair falling onto his right hip, he experienced immediate right hip pain and swelling. He denies hit to the head, LOC, any other orthopedic injuries. Pt denies SOB, CP, calf pain. (05 Oct 2020 16:02)      INTERVAL HPI/OVERNIGHT EVENTS:  Flatus (+) BM (-) Tolerating clear liquids. No N/V, abdominal pain    MEDICATIONS  (STANDING):  ascorbic acid 500 milliGRAM(s) Oral two times a day  dextrose 5% + sodium chloride 0.45% with potassium chloride 20 mEq/L 1000 milliLiter(s) (63 mL/Hr) IV Continuous <Continuous>  folic acid 1 milliGRAM(s) Oral daily  metoclopramide Injectable 10 milliGRAM(s) IV Push three times a day  multivitamin 1 Tablet(s) Oral daily  pantoprazole  Injectable 40 milliGRAM(s) IV Push every 12 hours    MEDICATIONS  (PRN):  acetaminophen   Tablet .. 975 milliGRAM(s) Oral every 8 hours PRN Moderate Pain (4 - 6)  benzocaine 15 mG/menthol 3.6 mG (Sugar-Free) Lozenge 1 Lozenge Oral every 8 hours PRN Sore Throat  calcium carbonate    500 mG (Tums) Chewable 2 Tablet(s) Chew every 4 hours PRN Heartburn  guaiFENesin   Syrup  (Sugar-Free) 200 milliGRAM(s) Oral every 6 hours PRN Cough  melatonin 3 milliGRAM(s) Oral at bedtime PRN Sleep  ondansetron Injectable 4 milliGRAM(s) IV Push every 6 hours PRN Nausea and/or Vomiting      FAMILY HISTORY:      Allergies    No Known Allergies    Intolerances        PMH/PSH:  No pertinent past medical history    No significant past surgical history          REVIEW OF SYSTEMS:  CONSTITUTIONAL: No fever, weight loss, or fatigue  EYES: No eye pain, visual disturbances, or discharge  ENMT:  No difficulty hearing, tinnitus, vertigo; No sinus or throat pain  NECK: No pain or stiffness  BREASTS: No pain, masses, or nipple discharge  RESPIRATORY: No cough, wheezing, chills or hemoptysis; No shortness of breath  CARDIOVASCULAR: No chest pain, palpitations, dizziness, or leg swelling  GASTROINTESTINAL:  See above  GENITOURINARY: No dysuria, frequency, hematuria, or incontinence  NEUROLOGICAL: No headaches,  loss of strength, numbness, or tremors  SKIN: No itching, burning, rashes, or lesions   LYMPH NODES: No enlarged glands  ENDOCRINE: No heat or cold intolerance; No hair loss  MUSCULOSKELETAL: No joint pain or swelling; No muscle, back, or extremity pain  PSYCHIATRIC: No depression, anxiety, mood swings, or difficulty sleeping  HEME/LYMPH: No easy bruising, or bleeding gums  ALLERGY AND IMMUNOLOGIC: No hives or eczema    Vital Signs Last 24 Hrs  T(C): 36.9 (20 Oct 2020 11:23), Max: 37.1 (19 Oct 2020 14:59)  T(F): 98.4 (20 Oct 2020 11:23), Max: 98.8 (19 Oct 2020 14:59)  HR: 92 (20 Oct 2020 14:23) (85 - 99)  BP: 101/72 (20 Oct 2020 14:23) (99/61 - 121/67)  BP(mean): --  RR: 16 (20 Oct 2020 14:23) (16 - 21)  SpO2: 94% (20 Oct 2020 14:23) (94% - 100%)    PHYSICAL EXAM:  GENERAL: NAD, well-groomed, well-developed  HEAD:  Atraumatic, Normocephalic  EYES: EOMI, PERRLA, conjunctiva and sclera clear  NECK: Supple, No JVD, Normal thyroid  NERVOUS SYSTEM:  Alert & Oriented X3, Good concentration; Motor Strength 5/5 B/L upper and lower extremities;  CHEST/LUNG: Clear to percussion bilaterally; No rales, rhonchi, wheezing, or rubs  HEART: Regular rate and rhythm; No murmurs, rubs, or gallops  ABDOMEN: Soft, Nontender, Nondistended; Bowel sounds present  EXTREMITIES:  2+ Peripheral Pulses, No clubbing, cyanosis, or edema  LYMPH: No lymphadenopathy noted  SKIN: No rashes or lesions    LAB                          9.8    7.03  )-----------( 208      ( 20 Oct 2020 07:42 )             31.1       CBC:  10-20 @ 07:42  WBC 7.03   Hgb 9.8   Hct 31.1   Plts 208  .3  10-19 @ 06:40  WBC 5.99   Hgb 9.5   Hct 30.6   Plts 174  .3  10-18 @ 07:03  WBC 10.68   Hgb 10.2   Hct 33.3   Plts 200  .9  10-17 @ 06:51  WBC 7.67   Hgb 9.8   Hct 30.3   Plts 183  MCV 98.4  10-16 @ 07:30  WBC 8.12   Hgb 8.9   Hct 27.2   Plts 188  MCV 96.8  10-15 @ 06:36  WBC 8.41   Hgb 10.1   Hct 31.6   Plts 257  MCV 99.1  10-14 @ 07:07  WBC 7.80   Hgb 9.7   Hct 30.4   Plts 273  MCV 98.7      Chemistry:  10-20 @ 07:42  Na+ 138  K+ 4.0  Cl- 105  CO2 28  BUN 28  Cr 0.93     10-19 @ 06:40  Na+ 140  K+ 3.5  Cl- 106  CO2 29  BUN 24  Cr 0.84     10-18 @ 07:03  Na+ 140  K+ 3.3  Cl- 103  CO2 33  BUN 24  Cr 0.95     10-17 @ 06:50  Na+ 140  K+ 3.8  Cl- 102  CO2 33  BUN 24  Cr 0.85     10-16 @ 07:30  Na+ 139  K+ 3.1  Cl- 104  CO2 32  BUN 25  Cr 0.86     10-15 @ 06:36  Na+ 138  K+ 3.2  Cl- 101  CO2 30  BUN 22  Cr 0.86     10-14 @ 07:07  Na+ 139  K+ 3.4  Cl- 99  CO2 33  BUN 26  Cr 0.81         Glucose, Serum: 90 mg/dL (10-20 @ 07:42)  Glucose, Serum: 83 mg/dL (10-19 @ 06:40)  Glucose, Serum: 88 mg/dL (10-18 @ 07:03)  Glucose, Serum: 92 mg/dL (10-17 @ 06:50)  Glucose, Serum: 102 mg/dL (10-16 @ 07:30)  Glucose, Serum: 94 mg/dL (10-15 @ 06:36)  Glucose, Serum: 99 mg/dL (10-14 @ 07:07)      20 Oct 2020 07:42    138    |  105    |  28     ----------------------------<  90     4.0     |  28     |  0.93   19 Oct 2020 06:40    140    |  106    |  24     ----------------------------<  83     3.5     |  29     |  0.84   18 Oct 2020 07:03    140    |  103    |  24     ----------------------------<  88     3.3     |  33     |  0.95   17 Oct 2020 06:50    140    |  102    |  24     ----------------------------<  92     3.8     |  33     |  0.85   16 Oct 2020 07:30    139    |  104    |  25     ----------------------------<  102    3.1     |  32     |  0.86   15 Oct 2020 06:36    138    |  101    |  22     ----------------------------<  94     3.2     |  30     |  0.86   14 Oct 2020 07:07    139    |  99     |  26     ----------------------------<  99     3.4     |  33     |  0.81     Ca    8.9        20 Oct 2020 07:42  Ca    8.4        19 Oct 2020 06:40  Ca    8.7        18 Oct 2020 07:03  Ca    8.8        17 Oct 2020 06:50  Ca    8.8        16 Oct 2020 07:30  Ca    8.5        15 Oct 2020 06:36  Ca    8.9        14 Oct 2020 07:07  Phos  3.4       20 Oct 2020 07:42  Phos  3.0       19 Oct 2020 06:40  Phos  3.1       18 Oct 2020 07:03  Phos  2.7       16 Oct 2020 07:30  Phos  2.9       15 Oct 2020 06:38  Mg     2.0       20 Oct 2020 07:42  Mg     2.4       19 Oct 2020 06:40  Mg     2.3       18 Oct 2020 07:03  Mg     1.9       16 Oct 2020 07:30  Mg     2.1       15 Oct 2020 06:38    TPro  6.0    /  Alb  2.6    /  TBili  2.2    /  DBili  x      /  AST  39     /  ALT  33     /  AlkPhos  105    20 Oct 2020 07:42              CAPILLARY BLOOD GLUCOSE              RADIOLOGY & ADDITIONAL TESTS:    Imaging Personally Reviewed:  [ ] YES  [ ] NO    Consultant(s) Notes Reviewed:  [ ] YES  [ ] NO    Care Discussed with Consultants/Other Providers [ ] YES  [ ] NO

## 2020-10-20 NOTE — PROGRESS NOTE ADULT - ASSESSMENT
86 y/o male with acute hip fracture and Afib.      illius vs gastric outlet obstruction  gastritis tolerating clears   -Routine post surgical ortho care. pain controlled   -Staples to be removed POD 14  Ortho stable for DC, Outpatient follow up    -hypokalemia replaced IVF with 20K resolved     UTI   UA ++ LE and nitrite but minimal wbc and epithelial cells. ucx neg. remains afebrile   afebrile s/p three day course of levaquin     acute blood loss anemia. 2/2 surgery   - cbc stable       Afib  -Echo reviewed  - Pt  agreed to AC so would recommend a NOAC. He reports only falling this one time. understands risk of falling on AC   - restart  xarelto 20 mg daily idf continues to tolerate po   vomitng   -2/2 post op ileus vs gastric outlet syndrome s/p egd   aspiration PNA   - ct shows lower lobe opacities and wbc stable and afebrile. procalcitonin normal off Levaquin   renal mass   - incidentally found on ct  - needs follow up with urologist   - pt aware but will remind the pt prior to dc     dispo to [facility] when clinically stable patient wants Orzac

## 2020-10-21 LAB
ANION GAP SERPL CALC-SCNC: 6 MMOL/L — SIGNIFICANT CHANGE UP (ref 5–17)
BUN SERPL-MCNC: 23 MG/DL — SIGNIFICANT CHANGE UP (ref 7–23)
CALCIUM SERPL-MCNC: 8.6 MG/DL — SIGNIFICANT CHANGE UP (ref 8.5–10.1)
CHLORIDE SERPL-SCNC: 102 MMOL/L — SIGNIFICANT CHANGE UP (ref 96–108)
CO2 SERPL-SCNC: 27 MMOL/L — SIGNIFICANT CHANGE UP (ref 22–31)
CREAT SERPL-MCNC: 0.9 MG/DL — SIGNIFICANT CHANGE UP (ref 0.5–1.3)
GLUCOSE SERPL-MCNC: 110 MG/DL — HIGH (ref 70–99)
HCT VFR BLD CALC: 29.9 % — LOW (ref 39–50)
HGB BLD-MCNC: 9.5 G/DL — LOW (ref 13–17)
MAGNESIUM SERPL-MCNC: 2.2 MG/DL — SIGNIFICANT CHANGE UP (ref 1.6–2.6)
MCHC RBC-ENTMCNC: 31.8 GM/DL — LOW (ref 32–36)
MCHC RBC-ENTMCNC: 31.8 PG — SIGNIFICANT CHANGE UP (ref 27–34)
MCV RBC AUTO: 100 FL — SIGNIFICANT CHANGE UP (ref 80–100)
NRBC # BLD: 0 /100 WBCS — SIGNIFICANT CHANGE UP (ref 0–0)
PHOSPHATE SERPL-MCNC: 3.1 MG/DL — SIGNIFICANT CHANGE UP (ref 2.5–4.5)
PLATELET # BLD AUTO: 214 K/UL — SIGNIFICANT CHANGE UP (ref 150–400)
POTASSIUM SERPL-MCNC: 3.6 MMOL/L — SIGNIFICANT CHANGE UP (ref 3.5–5.3)
POTASSIUM SERPL-SCNC: 3.6 MMOL/L — SIGNIFICANT CHANGE UP (ref 3.5–5.3)
RBC # BLD: 2.99 M/UL — LOW (ref 4.2–5.8)
RBC # FLD: 14.9 % — HIGH (ref 10.3–14.5)
SARS-COV-2 RNA SPEC QL NAA+PROBE: SIGNIFICANT CHANGE UP
SODIUM SERPL-SCNC: 135 MMOL/L — SIGNIFICANT CHANGE UP (ref 135–145)
WBC # BLD: 5.91 K/UL — SIGNIFICANT CHANGE UP (ref 3.8–10.5)
WBC # FLD AUTO: 5.91 K/UL — SIGNIFICANT CHANGE UP (ref 3.8–10.5)

## 2020-10-21 PROCEDURE — 74018 RADEX ABDOMEN 1 VIEW: CPT | Mod: 26

## 2020-10-21 PROCEDURE — 99232 SBSQ HOSP IP/OBS MODERATE 35: CPT

## 2020-10-21 RX ORDER — LACTULOSE 10 G/15ML
20 SOLUTION ORAL
Refills: 0 | Status: COMPLETED | OUTPATIENT
Start: 2020-10-21 | End: 2020-10-22

## 2020-10-21 RX ORDER — SUCRALFATE 1 G
1 TABLET ORAL
Refills: 0 | Status: DISCONTINUED | OUTPATIENT
Start: 2020-10-21 | End: 2020-10-23

## 2020-10-21 RX ORDER — LACTULOSE 10 G/15ML
10 SOLUTION ORAL
Refills: 0 | Status: DISCONTINUED | OUTPATIENT
Start: 2020-10-22 | End: 2020-10-22

## 2020-10-21 RX ADMIN — Medication 500 MILLIGRAM(S): at 18:17

## 2020-10-21 RX ADMIN — Medication 10 MILLIGRAM(S): at 05:52

## 2020-10-21 RX ADMIN — LACTULOSE 20 GRAM(S): 10 SOLUTION ORAL at 12:09

## 2020-10-21 RX ADMIN — Medication 1 MILLIGRAM(S): at 12:10

## 2020-10-21 RX ADMIN — PANTOPRAZOLE SODIUM 40 MILLIGRAM(S): 20 TABLET, DELAYED RELEASE ORAL at 05:52

## 2020-10-21 RX ADMIN — LACTULOSE 20 GRAM(S): 10 SOLUTION ORAL at 05:53

## 2020-10-21 RX ADMIN — PANTOPRAZOLE SODIUM 40 MILLIGRAM(S): 20 TABLET, DELAYED RELEASE ORAL at 18:17

## 2020-10-21 RX ADMIN — Medication 500 MILLIGRAM(S): at 05:52

## 2020-10-21 RX ADMIN — Medication 10 MILLIGRAM(S): at 22:08

## 2020-10-21 RX ADMIN — Medication 1 TABLET(S): at 12:10

## 2020-10-21 RX ADMIN — Medication 1 GRAM(S): at 05:52

## 2020-10-21 RX ADMIN — Medication 10 MILLIGRAM(S): at 12:15

## 2020-10-21 RX ADMIN — Medication 1 GRAM(S): at 12:10

## 2020-10-21 RX ADMIN — DEXTROSE MONOHYDRATE, SODIUM CHLORIDE, AND POTASSIUM CHLORIDE 63 MILLILITER(S): 50; .745; 4.5 INJECTION, SOLUTION INTRAVENOUS at 10:30

## 2020-10-21 RX ADMIN — Medication 1 GRAM(S): at 18:18

## 2020-10-21 NOTE — PROGRESS NOTE ADULT - ASSESSMENT
HPI:  85M otherwise healthy presents to the ED for right hip pain following a mechanical fall. Pt was sitting down in a chair when he missed the chair falling onto his right hip, he experienced immediate right hip pain and swelling. He denies hit to the head, LOC, any other orthopedic injuries. Pt denies SOB, CP, calf pain. (05 Oct 2020 16:02)  ------------ As Above -------------  The patient presented with a mechanical fall and fractured his hip. S/P repair 10/6. Patient developed N/V on 10/10. NGT inserted this AM revealed 3 liters of fluid. CT Scan done afterwards revealed gastric distention > ileus.   Patient felt much better with NGT. No history of PUD. No GI symptoms on admission. On ASA 81 QD.  Prior to admission, the patient denies melena, hematochezia, hematemesis, nausea, vomiting, abdominal pain, constipation, diarrhea, or change in bowel movements Normal colonoscopy 2017.   Flatus but no BMs. Enema without any results.    Goo VS ileus-    Flatus (+), BM (-) . EGD essentially negative. Tolerating full liquid diet  1) advance to regular diet  2) Continue IV Reglan 3) Lactulose 4)  f/u  KUB 5) Follow  electrolytes HPI:  85M otherwise healthy presents to the ED for right hip pain following a mechanical fall. Pt was sitting down in a chair when he missed the chair falling onto his right hip, he experienced immediate right hip pain and swelling. He denies hit to the head, LOC, any other orthopedic injuries. Pt denies SOB, CP, calf pain. (05 Oct 2020 16:02)  ------------ As Above -------------  The patient presented with a mechanical fall and fractured his hip. S/P repair 10/6. Patient developed N/V on 10/10. NGT inserted this AM revealed 3 liters of fluid. CT Scan done afterwards revealed gastric distention > ileus.   Patient felt much better with NGT. No history of PUD. No GI symptoms on admission. On ASA 81 QD.  Prior to admission, the patient denies melena, hematochezia, hematemesis, nausea, vomiting, abdominal pain, constipation, diarrhea, or change in bowel movements Normal colonoscopy 2017.   Flatus but no BMs. Enema without any results.    Goo VS ileus-    Flatus (+), BM (-) . EGD essentially negative. Tolerating full liquid diet  1) advance to regular diet  2) Continue IV Reglan 3) Lactulose 4)  f/u  KUB 5) Follow  electrolytes   Esophageal ulcer by EGD - biopsy was negative - PPI / Carafate HPI:  85M otherwise healthy presents to the ED for right hip pain following a mechanical fall. Pt was sitting down in a chair when he missed the chair falling onto his right hip, he experienced immediate right hip pain and swelling. He denies hit to the head, LOC, any other orthopedic injuries. Pt denies SOB, CP, calf pain. (05 Oct 2020 16:02)  ------------ As Above -------------  The patient presented with a mechanical fall and fractured his hip. S/P repair 10/6. Patient developed N/V on 10/10. NGT inserted this AM revealed 3 liters of fluid. CT Scan done afterwards revealed gastric distention > ileus.   Patient felt much better with NGT. No history of PUD. No GI symptoms on admission. On ASA 81 QD.  Prior to admission, the patient denies melena, hematochezia, hematemesis, nausea, vomiting, abdominal pain, constipation, diarrhea, or change in bowel movements Normal colonoscopy 2017.   Flatus but no BMs. Enema without any results.    Goo VS ileus-    Flatus (+), BM (-) . EGD essentially negative. Tolerating full liquid diet  1) advance to regular diet  2) Continue IV Reglan 3) Lactulose 4)  f/u  KUB 5) Follow  electrolytes   Esophageal ulcer by EGD - biopsy was negative - PPI / Carafate  Constipation - On IV Reglan 1) KUB  2) Lactulose ordered  3) Check TSH

## 2020-10-21 NOTE — PROGRESS NOTE ADULT - SUBJECTIVE AND OBJECTIVE BOX
Patient is a 85y old  Male who presents with a chief complaint of right hip fx (21 Oct 2020 03:41)      INTERVAL HPI/OVERNIGHT EVENTS: no events     MEDICATIONS  (STANDING):  ascorbic acid 500 milliGRAM(s) Oral two times a day  dextrose 5% + sodium chloride 0.45% with potassium chloride 20 mEq/L 1000 milliLiter(s) (63 mL/Hr) IV Continuous <Continuous>  folic acid 1 milliGRAM(s) Oral daily  lactulose Syrup 20 Gram(s) Oral <User Schedule>  metoclopramide Injectable 10 milliGRAM(s) IV Push three times a day  multivitamin 1 Tablet(s) Oral daily  pantoprazole  Injectable 40 milliGRAM(s) IV Push every 12 hours  sucralfate 1 Gram(s) Oral four times a day    MEDICATIONS  (PRN):  acetaminophen   Tablet .. 975 milliGRAM(s) Oral every 8 hours PRN Moderate Pain (4 - 6)  benzocaine 15 mG/menthol 3.6 mG (Sugar-Free) Lozenge 1 Lozenge Oral every 8 hours PRN Sore Throat  calcium carbonate    500 mG (Tums) Chewable 2 Tablet(s) Chew every 4 hours PRN Heartburn  guaiFENesin   Syrup  (Sugar-Free) 200 milliGRAM(s) Oral every 6 hours PRN Cough  melatonin 3 milliGRAM(s) Oral at bedtime PRN Sleep  ondansetron Injectable 4 milliGRAM(s) IV Push every 6 hours PRN Nausea and/or Vomiting      Allergies    No Known Allergies    Intolerances       Vital Signs Last 24 Hrs  T(C): 37 (21 Oct 2020 05:03), Max: 37 (21 Oct 2020 05:03)  T(F): 98.6 (21 Oct 2020 05:03), Max: 98.6 (21 Oct 2020 05:03)  HR: 88 (21 Oct 2020 05:03) (69 - 92)  BP: 102/59 (21 Oct 2020 05:03) (98/64 - 109/73)  BP(mean): --  RR: 18 (21 Oct 2020 05:03) (16 - 18)  SpO2: 99% (21 Oct 2020 05:03) (94% - 100%)    PHYSICAL EXAM:  GENERAL: NAD, well-groomed, well-developed  HEAD:  Atraumatic, Normocephalic  EYES: EOMI, PERRLA, conjunctiva and sclera clear  ENMT: No tonsillar erythema, exudates, or enlargement; Moist mucous membranes, Good dentition, No lesions  NECK: Supple, No JVD, Normal thyroid  NERVOUS SYSTEM:  Alert & Oriented X3, Good concentration; Motor Strength 5/5 B/L upper and lower extremities; DTRs 2+ intact and symmetric  CHEST/LUNG: Clear to percussion bilaterally; No rales, rhonchi, wheezing, or rubs  HEART: Regular rate and rhythm; No murmurs, rubs, or gallops  ABDOMEN: Soft, Nontender, Nondistended; Bowel sounds present  EXTREMITIES:  2+ Peripheral Pulses, No clubbing, cyanosis, or edema  LYMPH: No lymphadenopathy noted  SKIN: No rashes or lesions    LABS:                        9.5    5.91  )-----------( 214      ( 21 Oct 2020 07:58 )             29.9     10-21    135  |  102  |  23  ----------------------------<  110<H>  3.6   |  27  |  0.90    Ca    8.6      21 Oct 2020 07:58  Phos  3.1     10-21  Mg     2.2     10-21    TPro  6.0  /  Alb  2.6<L>  /  TBili  2.2<H>  /  DBili  x   /  AST  39<H>  /  ALT  33  /  AlkPhos  105  10-20        CAPILLARY BLOOD GLUCOSE          RADIOLOGY & ADDITIONAL TESTS:    Imaging Personally Reviewed:  [ X] YES  [ ] NO    Consultant(s) Notes Reviewed:  [ X] YES  [ ] NO    Care Discussed with Consultants/Other Providers [X ] YES  [ ] NO

## 2020-10-21 NOTE — PROGRESS NOTE ADULT - ASSESSMENT
86 y/o male with acute hip fracture and Afib.      illius vs gastric outlet obstruction  gastritis tolerating clears advance diet today   -Routine post surgical ortho care. pain controlled   -Staples to be removed POD 14  Ortho stable for DC, Outpatient follow up    -hypokalemia replaced      UTI   UA ++ LE and nitrite but minimal wbc and epithelial cells. ucx neg. remains afebrile   afebrile s/p three day course of levaquin     acute blood loss anemia. 2/2 surgery   - cbc stable       Afib  -Echo reviewed  - Pt  agreed to AC so would recommend a NOAC. He reports only falling this one time. understands risk of falling on AC   - restart  xarelto 20 mg daily   vomitng   -2/2 post op ileus vs gastric outlet syndrome s/p egd   aspiration PNA   - ct shows lower lobe opacities and wbc stable and afebrile. procalcitonin normal off Levaquin   renal mass   - incidentally found on ct  - needs follow up with urologist   - pt aware but will remind the pt prior to dc     dispo to [facility] when clinically stable patient wants Orzac

## 2020-10-21 NOTE — PROGRESS NOTE ADULT - SUBJECTIVE AND OBJECTIVE BOX
Patient is a 85y old  Male who presents with a chief complaint of right hip fx (20 Oct 2020 21:17)      HPI:  85M otherwise healthy presents to the ED for right hip pain following a mechanical fall. Pt was sitting down in a chair when he missed the chair falling onto his right hip, he experienced immediate right hip pain and swelling. He denies hit to the head, LOC, any other orthopedic injuries. Pt denies SOB, CP, calf pain. (05 Oct 2020 16:02)      INTERVAL HPI/OVERNIGHT EVENTS:  Flatus but no BM. Tolerating full liquid diet. Denies N/V or abdominal pain    MEDICATIONS  (STANDING):  ascorbic acid 500 milliGRAM(s) Oral two times a day  dextrose 5% + sodium chloride 0.45% with potassium chloride 20 mEq/L 1000 milliLiter(s) (63 mL/Hr) IV Continuous <Continuous>  folic acid 1 milliGRAM(s) Oral daily  metoclopramide Injectable 10 milliGRAM(s) IV Push three times a day  multivitamin 1 Tablet(s) Oral daily  pantoprazole  Injectable 40 milliGRAM(s) IV Push every 12 hours    MEDICATIONS  (PRN):  acetaminophen   Tablet .. 975 milliGRAM(s) Oral every 8 hours PRN Moderate Pain (4 - 6)  benzocaine 15 mG/menthol 3.6 mG (Sugar-Free) Lozenge 1 Lozenge Oral every 8 hours PRN Sore Throat  calcium carbonate    500 mG (Tums) Chewable 2 Tablet(s) Chew every 4 hours PRN Heartburn  guaiFENesin   Syrup  (Sugar-Free) 200 milliGRAM(s) Oral every 6 hours PRN Cough  melatonin 3 milliGRAM(s) Oral at bedtime PRN Sleep  ondansetron Injectable 4 milliGRAM(s) IV Push every 6 hours PRN Nausea and/or Vomiting      FAMILY HISTORY:      Allergies    No Known Allergies    Intolerances        PMH/PSH:  No pertinent past medical history    No significant past surgical history          REVIEW OF SYSTEMS:  CONSTITUTIONAL: No fever, weight loss, or fatigue  EYES: No eye pain, visual disturbances, or discharge  ENMT:  No difficulty hearing, tinnitus, vertigo; No sinus or throat pain  NECK: No pain or stiffness  BREASTS: No pain, masses, or nipple discharge  RESPIRATORY: No cough, wheezing, chills or hemoptysis; No shortness of breath  CARDIOVASCULAR: No chest pain, palpitations, dizziness, or leg swelling  GASTROINTESTINAL:  See above  GENITOURINARY: No dysuria, frequency, hematuria, or incontinence  NEUROLOGICAL: No headaches, memory loss, loss of strength, numbness, or tremors  SKIN: No itching, burning, rashes, or lesions   LYMPH NODES: No enlarged glands  ENDOCRINE: No heat or cold intolerance; No hair loss  MUSCULOSKELETAL: No joint pain or swelling; No muscle, back, or extremity pain  PSYCHIATRIC: No depression, anxiety, mood swings, or difficulty sleeping  HEME/LYMPH: No easy bruising, or bleeding gums  ALLERGY AND IMMUNOLOGIC: No hives or eczema    Vital Signs Last 24 Hrs  T(C): 36.9 (20 Oct 2020 23:16), Max: 36.9 (20 Oct 2020 11:23)  T(F): 98.4 (20 Oct 2020 23:16), Max: 98.4 (20 Oct 2020 11:23)  HR: 69 (20 Oct 2020 23:16) (69 - 92)  BP: 109/73 (20 Oct 2020 23:16) (98/64 - 109/73)  BP(mean): --  RR: 18 (20 Oct 2020 23:16) (16 - 18)  SpO2: 98% (20 Oct 2020 23:16) (94% - 100%)    PHYSICAL EXAM:  GENERAL: NAD, well-groomed, well-developed  HEAD:  Atraumatic, Normocephalic  EYES: EOMI, PERRLA, conjunctiva and sclera clear  NECK: Supple, No JVD, Normal thyroid  NERVOUS SYSTEM:  Alert & Oriented X3, Good concentration; Motor Strength 5/5 B/L upper and lower extremities;   CHEST/LUNG: Clear to percussion bilaterally; No rales, rhonchi, wheezing, or rubs  HEART: Regular rate and rhythm; No murmurs, rubs, or gallops  ABDOMEN: Soft, Nontender, Nondistended; Bowel sounds present  EXTREMITIES:  2+ Peripheral Pulses, No clubbing, cyanosis, or edema  LYMPH: No lymphadenopathy noted  SKIN: No rashes or lesions    LAB                          9.8    7.03  )-----------( 208      ( 20 Oct 2020 07:42 )             31.1       CBC:  10-20 @ 07:42  WBC 7.03   Hgb 9.8   Hct 31.1   Plts 208  .3  10-19 @ 06:40  WBC 5.99   Hgb 9.5   Hct 30.6   Plts 174  .3  10-18 @ 07:03  WBC 10.68   Hgb 10.2   Hct 33.3   Plts 200  .9  10-17 @ 06:51  WBC 7.67   Hgb 9.8   Hct 30.3   Plts 183  MCV 98.4  10-16 @ 07:30  WBC 8.12   Hgb 8.9   Hct 27.2   Plts 188  MCV 96.8  10-15 @ 06:36  WBC 8.41   Hgb 10.1   Hct 31.6   Plts 257  MCV 99.1  10-14 @ 07:07  WBC 7.80   Hgb 9.7   Hct 30.4   Plts 273  MCV 98.7      Chemistry:  10-20 @ 07:42  Na+ 138  K+ 4.0  Cl- 105  CO2 28  BUN 28  Cr 0.93     10-19 @ 06:40  Na+ 140  K+ 3.5  Cl- 106  CO2 29  BUN 24  Cr 0.84     10-18 @ 07:03  Na+ 140  K+ 3.3  Cl- 103  CO2 33  BUN 24  Cr 0.95     10-17 @ 06:50  Na+ 140  K+ 3.8  Cl- 102  CO2 33  BUN 24  Cr 0.85     10-16 @ 07:30  Na+ 139  K+ 3.1  Cl- 104  CO2 32  BUN 25  Cr 0.86     10-15 @ 06:36  Na+ 138  K+ 3.2  Cl- 101  CO2 30  BUN 22  Cr 0.86     10-14 @ 07:07  Na+ 139  K+ 3.4  Cl- 99  CO2 33  BUN 26  Cr 0.81         Glucose, Serum: 90 mg/dL (10-20 @ 07:42)  Glucose, Serum: 83 mg/dL (10-19 @ 06:40)  Glucose, Serum: 88 mg/dL (10-18 @ 07:03)  Glucose, Serum: 92 mg/dL (10-17 @ 06:50)  Glucose, Serum: 102 mg/dL (10-16 @ 07:30)  Glucose, Serum: 94 mg/dL (10-15 @ 06:36)  Glucose, Serum: 99 mg/dL (10-14 @ 07:07)      20 Oct 2020 07:42    138    |  105    |  28     ----------------------------<  90     4.0     |  28     |  0.93   19 Oct 2020 06:40    140    |  106    |  24     ----------------------------<  83     3.5     |  29     |  0.84   18 Oct 2020 07:03    140    |  103    |  24     ----------------------------<  88     3.3     |  33     |  0.95   17 Oct 2020 06:50    140    |  102    |  24     ----------------------------<  92     3.8     |  33     |  0.85   16 Oct 2020 07:30    139    |  104    |  25     ----------------------------<  102    3.1     |  32     |  0.86   15 Oct 2020 06:36    138    |  101    |  22     ----------------------------<  94     3.2     |  30     |  0.86   14 Oct 2020 07:07    139    |  99     |  26     ----------------------------<  99     3.4     |  33     |  0.81     Ca    8.9        20 Oct 2020 07:42  Ca    8.4        19 Oct 2020 06:40  Ca    8.7        18 Oct 2020 07:03  Ca    8.8        17 Oct 2020 06:50  Ca    8.8        16 Oct 2020 07:30  Ca    8.5        15 Oct 2020 06:36  Ca    8.9        14 Oct 2020 07:07  Phos  3.4       20 Oct 2020 07:42  Phos  3.0       19 Oct 2020 06:40  Phos  3.1       18 Oct 2020 07:03  Phos  2.7       16 Oct 2020 07:30  Phos  2.9       15 Oct 2020 06:38  Mg     2.0       20 Oct 2020 07:42  Mg     2.4       19 Oct 2020 06:40  Mg     2.3       18 Oct 2020 07:03  Mg     1.9       16 Oct 2020 07:30  Mg     2.1       15 Oct 2020 06:38    TPro  6.0    /  Alb  2.6    /  TBili  2.2    /  DBili  x      /  AST  39     /  ALT  33     /  AlkPhos  105    20 Oct 2020 07:42              CAPILLARY BLOOD GLUCOSE              RADIOLOGY & ADDITIONAL TESTS:    Imaging Personally Reviewed:  [ ] YES  [ ] NO    Consultant(s) Notes Reviewed:  [ ] YES  [ ] NO    Care Discussed with Consultants/Other Providers [ ] YES  [ ] NO

## 2020-10-22 LAB
ANION GAP SERPL CALC-SCNC: 4 MMOL/L — LOW (ref 5–17)
BUN SERPL-MCNC: 20 MG/DL — SIGNIFICANT CHANGE UP (ref 7–23)
CALCIUM SERPL-MCNC: 8.4 MG/DL — LOW (ref 8.5–10.1)
CHLORIDE SERPL-SCNC: 104 MMOL/L — SIGNIFICANT CHANGE UP (ref 96–108)
CO2 SERPL-SCNC: 27 MMOL/L — SIGNIFICANT CHANGE UP (ref 22–31)
CREAT SERPL-MCNC: 0.93 MG/DL — SIGNIFICANT CHANGE UP (ref 0.5–1.3)
GLUCOSE SERPL-MCNC: 85 MG/DL — SIGNIFICANT CHANGE UP (ref 70–99)
HCT VFR BLD CALC: 29.8 % — LOW (ref 39–50)
HGB BLD-MCNC: 9.1 G/DL — LOW (ref 13–17)
MAGNESIUM SERPL-MCNC: 2.2 MG/DL — SIGNIFICANT CHANGE UP (ref 1.6–2.6)
MCHC RBC-ENTMCNC: 30.5 GM/DL — LOW (ref 32–36)
MCHC RBC-ENTMCNC: 30.7 PG — SIGNIFICANT CHANGE UP (ref 27–34)
MCV RBC AUTO: 100.7 FL — HIGH (ref 80–100)
NRBC # BLD: 0 /100 WBCS — SIGNIFICANT CHANGE UP (ref 0–0)
PLATELET # BLD AUTO: 228 K/UL — SIGNIFICANT CHANGE UP (ref 150–400)
POTASSIUM SERPL-MCNC: 3.9 MMOL/L — SIGNIFICANT CHANGE UP (ref 3.5–5.3)
POTASSIUM SERPL-SCNC: 3.9 MMOL/L — SIGNIFICANT CHANGE UP (ref 3.5–5.3)
RBC # BLD: 2.96 M/UL — LOW (ref 4.2–5.8)
RBC # FLD: 15.1 % — HIGH (ref 10.3–14.5)
SODIUM SERPL-SCNC: 135 MMOL/L — SIGNIFICANT CHANGE UP (ref 135–145)
WBC # BLD: 5.5 K/UL — SIGNIFICANT CHANGE UP (ref 3.8–10.5)
WBC # FLD AUTO: 5.5 K/UL — SIGNIFICANT CHANGE UP (ref 3.8–10.5)

## 2020-10-22 PROCEDURE — 99239 HOSP IP/OBS DSCHRG MGMT >30: CPT

## 2020-10-22 RX ORDER — MIDODRINE HYDROCHLORIDE 2.5 MG/1
5 TABLET ORAL THREE TIMES A DAY
Refills: 0 | Status: DISCONTINUED | OUTPATIENT
Start: 2020-10-22 | End: 2020-10-23

## 2020-10-22 RX ORDER — SODIUM CHLORIDE 9 MG/ML
500 INJECTION, SOLUTION INTRAVENOUS ONCE
Refills: 0 | Status: COMPLETED | OUTPATIENT
Start: 2020-10-22 | End: 2020-10-22

## 2020-10-22 RX ORDER — MIDODRINE HYDROCHLORIDE 2.5 MG/1
1 TABLET ORAL
Qty: 0 | Refills: 0 | DISCHARGE
Start: 2020-10-22

## 2020-10-22 RX ORDER — RIVAROXABAN 15 MG-20MG
20 KIT ORAL
Refills: 0 | Status: DISCONTINUED | OUTPATIENT
Start: 2020-10-22 | End: 2020-10-23

## 2020-10-22 RX ORDER — LACTULOSE 10 G/15ML
15 SOLUTION ORAL
Qty: 0 | Refills: 0 | DISCHARGE
Start: 2020-10-22

## 2020-10-22 RX ORDER — RIVAROXABAN 15 MG-20MG
1 KIT ORAL
Qty: 0 | Refills: 0 | DISCHARGE
Start: 2020-10-22

## 2020-10-22 RX ORDER — SUCRALFATE 1 G
1 TABLET ORAL
Qty: 0 | Refills: 0 | DISCHARGE
Start: 2020-10-22

## 2020-10-22 RX ORDER — LACTULOSE 10 G/15ML
20 SOLUTION ORAL
Refills: 0 | Status: DISCONTINUED | OUTPATIENT
Start: 2020-10-22 | End: 2020-10-23

## 2020-10-22 RX ADMIN — LACTULOSE 20 GRAM(S): 10 SOLUTION ORAL at 18:22

## 2020-10-22 RX ADMIN — Medication 10 MILLIGRAM(S): at 13:45

## 2020-10-22 RX ADMIN — PANTOPRAZOLE SODIUM 40 MILLIGRAM(S): 20 TABLET, DELAYED RELEASE ORAL at 17:40

## 2020-10-22 RX ADMIN — Medication 10 MILLIGRAM(S): at 05:38

## 2020-10-22 RX ADMIN — Medication 10 MILLIGRAM(S): at 21:46

## 2020-10-22 RX ADMIN — Medication 1 GRAM(S): at 11:57

## 2020-10-22 RX ADMIN — SODIUM CHLORIDE 500 MILLILITER(S): 9 INJECTION, SOLUTION INTRAVENOUS at 06:38

## 2020-10-22 RX ADMIN — PANTOPRAZOLE SODIUM 40 MILLIGRAM(S): 20 TABLET, DELAYED RELEASE ORAL at 05:38

## 2020-10-22 RX ADMIN — Medication 500 MILLIGRAM(S): at 17:40

## 2020-10-22 RX ADMIN — Medication 1 TABLET(S): at 11:57

## 2020-10-22 RX ADMIN — Medication 1 GRAM(S): at 00:12

## 2020-10-22 RX ADMIN — Medication 1 MILLIGRAM(S): at 11:57

## 2020-10-22 RX ADMIN — MIDODRINE HYDROCHLORIDE 5 MILLIGRAM(S): 2.5 TABLET ORAL at 11:57

## 2020-10-22 RX ADMIN — MIDODRINE HYDROCHLORIDE 5 MILLIGRAM(S): 2.5 TABLET ORAL at 17:40

## 2020-10-22 RX ADMIN — Medication 1 GRAM(S): at 17:40

## 2020-10-22 RX ADMIN — RIVAROXABAN 20 MILLIGRAM(S): KIT at 17:40

## 2020-10-22 RX ADMIN — DEXTROSE MONOHYDRATE, SODIUM CHLORIDE, AND POTASSIUM CHLORIDE 63 MILLILITER(S): 50; .745; 4.5 INJECTION, SOLUTION INTRAVENOUS at 21:47

## 2020-10-22 NOTE — PROGRESS NOTE ADULT - SUBJECTIVE AND OBJECTIVE BOX
2 weeks s/p CMN R hip fx.  min hip pain. working w PT.  plan for [facility]    R hip  inc cdi  no pain logroll  5/5 ta/gs/ehl  silt dp/sp/tib  bcr    xrays R hip and femur: s/p CMN with well positioned hardware    2 weeks s/p R hip fx  wbat, pt  dvtp  pain control  dc staples  dispo [facility]  repeat xrays 1 month

## 2020-10-22 NOTE — CHART NOTE - NSCHARTNOTEFT_GEN_A_CORE
House PA Note     pt with BP- 90/50, Pt c/o feeling weak. Pt denies any CP/SOB/dizziness/other complaints     Vital Signs Last 24 Hrs  T(C): 36.6 (21 Oct 2020 23:12), Max: 36.7 (21 Oct 2020 11:05)  T(F): 97.9 (21 Oct 2020 23:12), Max: 98.1 (21 Oct 2020 11:05)  HR: 92 (21 Oct 2020 23:12) (89 - 95)  BP: 106/83 (21 Oct 2020 23:12) (91/58 - 106/83)  BP(mean): --  RR: 17 (21 Oct 2020 23:12) (16 - 17)  SpO2: 93% (21 Oct 2020 23:12) (93% - 100%)    A/P  -- weak, afebrile, no localized complaints  -- bolus of 500 cc ordered, cont monitoring, House PA Note     pt with BP- 90/50, Pt c/o feeling weak. Pt denies any CP/SOB/dizziness/other complaints     Vital Signs Last 24 Hrs  T(C): 36.6 (21 Oct 2020 23:12), Max: 36.7 (21 Oct 2020 11:05)  T(F): 97.9 (21 Oct 2020 23:12), Max: 98.1 (21 Oct 2020 11:05)  HR: 92 (21 Oct 2020 23:12) (89 - 95)  BP: 106/83 (21 Oct 2020 23:12) (91/58 - 106/83)  BP(mean): --  RR: 17 (21 Oct 2020 23:12) (16 - 17)  SpO2: 93% (21 Oct 2020 23:12) (93% - 100%)    A/P  -- weak, afebrile, no localized complaints  -- bolus of 500 cc ordered, cont monitoring,  -- f/u am labs House PA Note     pt with BP- 90/50, Pt c/o feeling weak. Pt denies any CP/SOB/dizziness/other complaints     Vital Signs Last 24 Hrs  T(C): 36.6 (21 Oct 2020 23:12), Max: 36.7 (21 Oct 2020 11:05)  T(F): 97.9 (21 Oct 2020 23:12), Max: 98.1 (21 Oct 2020 11:05)  HR: 92 (21 Oct 2020 23:12) (89 - 95)  BP: 106/83 (21 Oct 2020 23:12) (91/58 - 106/83)  BP(mean): --  RR: 17 (21 Oct 2020 23:12) (16 - 17)  SpO2: 93% (21 Oct 2020 23:12) (93% - 100%)    A/P  -- weak, afebrile, no localized complaints  -- bolus of 500 cc ordered, cont monitoring,  -- f/u am labs  -- restarted Xarelto as per attn's recs

## 2020-10-22 NOTE — CHART NOTE - NSCHARTNOTEFT_GEN_A_CORE
Pt seen and examined at bedside, staples removed, incision clean, dry, intact and healing well, steri strips placed. Leave steri strips on until fall off on their own, repeat xrays demonstrate good eveidence of healing. Follow up with Dr. Richardson in 2 weeks for rpt xrays. Ortho stable for D/C to rehab.

## 2020-10-22 NOTE — PROGRESS NOTE ADULT - ASSESSMENT
HPI:  85M otherwise healthy presents to the ED for right hip pain following a mechanical fall. Pt was sitting down in a chair when he missed the chair falling onto his right hip, he experienced immediate right hip pain and swelling. He denies hit to the head, LOC, any other orthopedic injuries. Pt denies SOB, CP, calf pain. (05 Oct 2020 16:02)  ------------ As Above -------------  The patient presented with a mechanical fall and fractured his hip. S/P repair 10/6. Patient developed N/V on 10/10. NGT inserted this AM revealed 3 liters of fluid. CT Scan done afterwards revealed gastric distention > ileus.   Patient felt much better with NGT. No history of PUD. No GI symptoms on admission. On ASA 81 QD.  Prior to admission, the patient denies melena, hematochezia, hematemesis, nausea, vomiting, abdominal pain, constipation, diarrhea, or change in bowel movements Normal colonoscopy 2017.   Flatus but no BMs. Enema without any results.    Goo VS ileus-    Flatus (+), BM (-) . EGD essentially negative. Tolerating regular diet  1) decrease IV Reglan 5 TID 2)  Lactulose 3)  f/u  KUB 4) Follow  electrolytes   Esophageal ulcer by EGD - biopsy was negative - PPI / Carafate  Constipation - On IV Reglan ( being tapered )  1) f/ u  KUB  2) Lactulose ordered  3) Check TSH 4) Enema x 1 HPI:  85M otherwise healthy presents to the ED for right hip pain following a mechanical fall. Pt was sitting down in a chair when he missed the chair falling onto his right hip, he experienced immediate right hip pain and swelling. He denies hit to the head, LOC, any other orthopedic injuries. Pt denies SOB, CP, calf pain. (05 Oct 2020 16:02)  ------------ As Above -------------  The patient presented with a mechanical fall and fractured his hip. S/P repair 10/6. Patient developed N/V on 10/10. NGT inserted this AM revealed 3 liters of fluid. CT Scan done afterwards revealed gastric distention > ileus.   Patient felt much better with NGT. No history of PUD. No GI symptoms on admission. On ASA 81 QD.  Prior to admission, the patient denies melena, hematochezia, hematemesis, nausea, vomiting, abdominal pain, constipation, diarrhea, or change in bowel movements Normal colonoscopy 2017.   Flatus but no BMs. Enema without any results.    Goo VS ileus-    Flatus (+), BM (-) . EGD essentially negative. Tolerating regular diet  1) decrease IV Reglan 5 TID 2)  Lactulose 3)  f/u  KUB 4) Follow  electrolytes   Esophageal ulcer by EGD - biopsy was negative - PPI / Carafate  Constipation - On IV Reglan ( being tapered ) TSH normal   1) f/ u  KUB  2) Lactulose ordered  3) Enema x 1

## 2020-10-22 NOTE — PROGRESS NOTE ADULT - SUBJECTIVE AND OBJECTIVE BOX
Patient is a 85y old  Male who presents with a chief complaint of right hip fx (22 Oct 2020 06:12)      HPI:  85M otherwise healthy presents to the ED for right hip pain following a mechanical fall. Pt was sitting down in a chair when he missed the chair falling onto his right hip, he experienced immediate right hip pain and swelling. He denies hit to the head, LOC, any other orthopedic injuries. Pt denies SOB, CP, calf pain. (05 Oct 2020 16:02)      INTERVAL HPI/OVERNIGHT EVENTS: Patient seen earlier today  Constipated (+). The patient denies melena, hematochezia, hematemesis, nausea, vomiting, abdominal pain, constipation, diarrhea, or change in bowel movements. KUB reveals large amount of stool in rectal vault / descending colon    MEDICATIONS  (STANDING):  ascorbic acid 500 milliGRAM(s) Oral two times a day  dextrose 5% + sodium chloride 0.45% with potassium chloride 20 mEq/L 1000 milliLiter(s) (63 mL/Hr) IV Continuous <Continuous>  folic acid 1 milliGRAM(s) Oral daily  metoclopramide Injectable 10 milliGRAM(s) IV Push three times a day  midodrine. 5 milliGRAM(s) Oral three times a day  multivitamin 1 Tablet(s) Oral daily  pantoprazole  Injectable 40 milliGRAM(s) IV Push every 12 hours  rivaroxaban 20 milliGRAM(s) Oral with dinner  sucralfate 1 Gram(s) Oral four times a day    MEDICATIONS  (PRN):  acetaminophen   Tablet .. 975 milliGRAM(s) Oral every 8 hours PRN Moderate Pain (4 - 6)  benzocaine 15 mG/menthol 3.6 mG (Sugar-Free) Lozenge 1 Lozenge Oral every 8 hours PRN Sore Throat  calcium carbonate    500 mG (Tums) Chewable 2 Tablet(s) Chew every 4 hours PRN Heartburn  guaiFENesin   Syrup  (Sugar-Free) 200 milliGRAM(s) Oral every 6 hours PRN Cough  melatonin 3 milliGRAM(s) Oral at bedtime PRN Sleep  ondansetron Injectable 4 milliGRAM(s) IV Push every 6 hours PRN Nausea and/or Vomiting      FAMILY HISTORY:      Allergies    No Known Allergies    Intolerances        PMH/PSH:  No pertinent past medical history    No significant past surgical history          REVIEW OF SYSTEMS:  CONSTITUTIONAL: No fever, weight loss,   EYES: No eye pain, visual disturbances, or discharge  ENMT:  No difficulty hearing, tinnitus, vertigo; No sinus or throat pain  NECK: No pain or stiffness  BREASTS: No pain, masses, or nipple discharge  RESPIRATORY: No cough, wheezing, chills or hemoptysis; No shortness of breath  CARDIOVASCULAR: No chest pain, palpitations, dizziness, or leg swelling  GASTROINTESTINAL: See above  GENITOURINARY: No dysuria, frequency, hematuria, or incontinence  NEUROLOGICAL: No headaches, memory loss, loss of strength, numbness, or tremors  SKIN: No itching, burning, rashes, or lesions   LYMPH NODES: No enlarged glands  ENDOCRINE: No heat or cold intolerance; No hair loss  MUSCULOSKELETAL: No joint pain or swelling; No muscle, back, or extremity pain  PSYCHIATRIC: No depression, anxiety, mood swings, or difficulty sleeping  HEME/LYMPH: No easy bruising, or bleeding gums  ALLERGY AND IMMUNOLOGIC: No hives or eczema    Vital Signs Last 24 Hrs  T(C): 36.7 (22 Oct 2020 11:41), Max: 36.7 (21 Oct 2020 17:16)  T(F): 98 (22 Oct 2020 11:41), Max: 98 (21 Oct 2020 17:16)  HR: 92 (22 Oct 2020 11:41) (89 - 94)  BP: 93/56 (22 Oct 2020 11:41) (90/50 - 106/83)  BP(mean): --  RR: 20 (22 Oct 2020 11:41) (17 - 20)  SpO2: 99% (22 Oct 2020 11:41) (93% - 99%)    PHYSICAL EXAM:  GENERAL: NAD, well-groomed, well-developed  HEAD:  Atraumatic, Normocephalic  EYES: EOMI, PERRLA, conjunctiva and sclera clear  NECK: Supple, No JVD, Normal thyroid  NERVOUS SYSTEM:  Alert & Oriented X3, Good concentration; Motor Strength 5/5 B/L upper and lower extremities;  CHEST/LUNG: Clear to percussion bilaterally; No rales, rhonchi, wheezing, or rubs  HEART: Regular rate and rhythm; No murmurs, rubs, or gallops  ABDOMEN: Soft, Nontender, Nondistended; Bowel sounds present  EXTREMITIES:  2+ Peripheral Pulses, No clubbing, cyanosis, or edema  LYMPH: No lymphadenopathy noted  SKIN: No rashes or lesions    LAB                          9.1    5.50  )-----------( 228      ( 22 Oct 2020 06:59 )             29.8       CBC:  10-22 @ 06:59  WBC 5.50   Hgb 9.1   Hct 29.8   Plts 228  .7  10-21 @ 07:58  WBC 5.91   Hgb 9.5   Hct 29.9   Plts 214  .0  10-20 @ 07:42  WBC 7.03   Hgb 9.8   Hct 31.1   Plts 208  .3  10-19 @ 06:40  WBC 5.99   Hgb 9.5   Hct 30.6   Plts 174  .3  10-18 @ 07:03  WBC 10.68   Hgb 10.2   Hct 33.3   Plts 200  .9  10-17 @ 06:51  WBC 7.67   Hgb 9.8   Hct 30.3   Plts 183  MCV 98.4  10-16 @ 07:30  WBC 8.12   Hgb 8.9   Hct 27.2   Plts 188  MCV 96.8      Chemistry:  10-22 @ 06:59  Na+ 135  K+ 3.9  Cl- 104  CO2 27  BUN 20  Cr 0.93     10-21 @ 07:58  Na+ 135  K+ 3.6  Cl- 102  CO2 27  BUN 23  Cr 0.90     10-20 @ 07:42  Na+ 138  K+ 4.0  Cl- 105  CO2 28  BUN 28  Cr 0.93     10-19 @ 06:40  Na+ 140  K+ 3.5  Cl- 106  CO2 29  BUN 24  Cr 0.84     10-18 @ 07:03  Na+ 140  K+ 3.3  Cl- 103  CO2 33  BUN 24  Cr 0.95     10-17 @ 06:50  Na+ 140  K+ 3.8  Cl- 102  CO2 33  BUN 24  Cr 0.85     10-16 @ 07:30  Na+ 139  K+ 3.1  Cl- 104  CO2 32  BUN 25  Cr 0.86         Glucose, Serum: 85 mg/dL (10-22 @ 06:59)  Glucose, Serum: 110 mg/dL (10-21 @ 07:58)  Glucose, Serum: 90 mg/dL (10-20 @ 07:42)  Glucose, Serum: 83 mg/dL (10-19 @ 06:40)  Glucose, Serum: 88 mg/dL (10-18 @ 07:03)  Glucose, Serum: 92 mg/dL (10-17 @ 06:50)  Glucose, Serum: 102 mg/dL (10-16 @ 07:30)      22 Oct 2020 06:59    135    |  104    |  20     ----------------------------<  85     3.9     |  27     |  0.93   21 Oct 2020 07:58    135    |  102    |  23     ----------------------------<  110    3.6     |  27     |  0.90   20 Oct 2020 07:42    138    |  105    |  28     ----------------------------<  90     4.0     |  28     |  0.93   19 Oct 2020 06:40    140    |  106    |  24     ----------------------------<  83     3.5     |  29     |  0.84   18 Oct 2020 07:03    140    |  103    |  24     ----------------------------<  88     3.3     |  33     |  0.95   17 Oct 2020 06:50    140    |  102    |  24     ----------------------------<  92     3.8     |  33     |  0.85   16 Oct 2020 07:30    139    |  104    |  25     ----------------------------<  102    3.1     |  32     |  0.86     Ca    8.4        22 Oct 2020 06:59  Ca    8.6        21 Oct 2020 07:58  Ca    8.9        20 Oct 2020 07:42  Ca    8.4        19 Oct 2020 06:40  Ca    8.7        18 Oct 2020 07:03  Ca    8.8        17 Oct 2020 06:50  Ca    8.8        16 Oct 2020 07:30  Phos  3.1       21 Oct 2020 07:58  Phos  3.4       20 Oct 2020 07:42  Phos  3.0       19 Oct 2020 06:40  Phos  3.1       18 Oct 2020 07:03  Phos  2.7       16 Oct 2020 07:30  Mg     2.2       22 Oct 2020 06:59  Mg     2.2       21 Oct 2020 07:58  Mg     2.0       20 Oct 2020 07:42  Mg     2.4       19 Oct 2020 06:40  Mg     2.3       18 Oct 2020 07:03  Mg     1.9       16 Oct 2020 07:30    TPro  6.0    /  Alb  2.6    /  TBili  2.2    /  DBili  x      /  AST  39     /  ALT  33     /  AlkPhos  105    20 Oct 2020 07:42              CAPILLARY BLOOD GLUCOSE              RADIOLOGY & ADDITIONAL TESTS:    Imaging Personally Reviewed:  [ ] YES  [ ] NO    Consultant(s) Notes Reviewed:  [ ] YES  [ ] NO    Care Discussed with Consultants/Other Providers [ ] YES  [ ] NO

## 2020-10-23 ENCOUNTER — TRANSCRIPTION ENCOUNTER (OUTPATIENT)
Age: 85
End: 2020-10-23

## 2020-10-23 VITALS
DIASTOLIC BLOOD PRESSURE: 57 MMHG | SYSTOLIC BLOOD PRESSURE: 119 MMHG | RESPIRATION RATE: 18 BRPM | OXYGEN SATURATION: 98 % | TEMPERATURE: 99 F | HEART RATE: 84 BPM

## 2020-10-23 PROCEDURE — 99232 SBSQ HOSP IP/OBS MODERATE 35: CPT

## 2020-10-23 RX ORDER — LACTULOSE 10 G/15ML
30 SOLUTION ORAL
Qty: 0 | Refills: 0 | DISCHARGE
Start: 2020-10-23

## 2020-10-23 RX ADMIN — MIDODRINE HYDROCHLORIDE 5 MILLIGRAM(S): 2.5 TABLET ORAL at 13:00

## 2020-10-23 RX ADMIN — Medication 1 GRAM(S): at 05:23

## 2020-10-23 RX ADMIN — Medication 500 MILLIGRAM(S): at 05:23

## 2020-10-23 RX ADMIN — Medication 10 MILLIGRAM(S): at 05:24

## 2020-10-23 RX ADMIN — Medication 1 TABLET(S): at 13:00

## 2020-10-23 RX ADMIN — DEXTROSE MONOHYDRATE, SODIUM CHLORIDE, AND POTASSIUM CHLORIDE 63 MILLILITER(S): 50; .745; 4.5 INJECTION, SOLUTION INTRAVENOUS at 05:27

## 2020-10-23 RX ADMIN — MIDODRINE HYDROCHLORIDE 5 MILLIGRAM(S): 2.5 TABLET ORAL at 05:23

## 2020-10-23 RX ADMIN — Medication 1 GRAM(S): at 13:00

## 2020-10-23 RX ADMIN — Medication 1 MILLIGRAM(S): at 13:00

## 2020-10-23 RX ADMIN — Medication 10 MILLIGRAM(S): at 13:01

## 2020-10-23 RX ADMIN — PANTOPRAZOLE SODIUM 40 MILLIGRAM(S): 20 TABLET, DELAYED RELEASE ORAL at 05:26

## 2020-10-23 NOTE — CHART NOTE - NSCHARTNOTEFT_GEN_A_CORE
Nutrition follow-up note-    Pt adm w/R-hip fracture s/p fall. Pt s/p R-hip IMN (10/6). Pt w/ Ileus vs. gastric outlet obstruction. S/P EGD. Acute blood loss anemia 2/2 to surgery ; UTI ; A-Fib ; NG tube removed 10/19. Met w/pt at bedside, pt reports good appetite and PO intake. Pt reports drinking Ensure shakes and states he likes it. Pt Denies N/V/D/C or difficulty Chewing/Swallowing. Encouraged pt to maintain good PO intake and continue drinking supplement. Per chart D/C plan is [facility].    Factors impacting intake: [x ] none [ ] nausea  [ ] vomiting [ ] diarrhea [ ] constipation  [ ]chewing problems [ ] swallowing issues  [ ] other:     Diet Prescription: Diet, Soft:   DASH/TLC {Sodium & Cholesterol Restricted}  Supplement Feeding Modality:  Oral  Ensure Enlive Cans or Servings Per Day:  1       Frequency:  Two Times a day (10-21-20 @ 10:47)    Intake: ~75% of meals reported. Pt reports drinking PO supplement Ensure Enlive x 2/day (provides 700 kcal, 40 g protein).    Current Weight: (10/23) 98 kg, (10/6 adm) 99.3 kg.  % Weight Change: 1.3% loss (1.3 kg) x 18 days/since admission. Noted fluctuating edema.    Physical appearance: 1+ edema L-leg, 2+ edema R-leg and R-hip.    Pertinent Medications: MEDICATIONS  (STANDING):  ascorbic acid 500 milliGRAM(s) Oral two times a day  dextrose 5% + sodium chloride 0.45% with potassium chloride 20 mEq/L 1000 milliLiter(s) (63 mL/Hr) IV Continuous <Continuous>  folic acid 1 milliGRAM(s) Oral daily  lactulose Syrup 20 Gram(s) Oral <User Schedule>  metoclopramide Injectable 10 milliGRAM(s) IV Push three times a day  midodrine. 5 milliGRAM(s) Oral three times a day  multivitamin 1 Tablet(s) Oral daily  pantoprazole  Injectable 40 milliGRAM(s) IV Push every 12 hours  rivaroxaban 20 milliGRAM(s) Oral with dinner  sucralfate 1 Gram(s) Oral four times a day    MEDICATIONS  (PRN):  acetaminophen   Tablet .. 975 milliGRAM(s) Oral every 8 hours PRN Moderate Pain (4 - 6)  benzocaine 15 mG/menthol 3.6 mG (Sugar-Free) Lozenge 1 Lozenge Oral every 8 hours PRN Sore Throat  calcium carbonate    500 mG (Tums) Chewable 2 Tablet(s) Chew every 4 hours PRN Heartburn  guaiFENesin   Syrup  (Sugar-Free) 200 milliGRAM(s) Oral every 6 hours PRN Cough  melatonin 3 milliGRAM(s) Oral at bedtime PRN Sleep  ondansetron Injectable 4 milliGRAM(s) IV Push every 6 hours PRN Nausea and/or Vomiting    Pertinent Labs: 10-22 Na135 mmol/L Glu 85 mg/dL K+ 3.9 mmol/L Cr  0.93 mg/dL BUN 20 mg/dL 10-21 Phos 3.1 mg/dL 10-20 Alb 2.6 g/dL<L>      Skin: intact, no pressure ulcers.    Estimated Needs:   [x ] no change since previous assessment (10/12)  [ ] recalculated:     Previous Nutrition Diagnosis:     Malnutrition Severe malnutrition in the context of chronic illness.   Etiology inadequate energy, protein intake in the presence of cardiac hx and self-imposed, excessive, unhealthy wt loss.   Signs/Symptoms physical findings of severe fat & muscle wasting noted.     Goal/Expected Outcome Pt will consume >75% nut'l needs via meals & supplement once diet is advanced (GOAL being met via meals & PO supplement), maintain wt +/- 5#.       Interventions:   Recommend:  [ x] Change Diet To: Soft, Ensure Enlive x 2/day (provides 700 kcal, 40 g protein)   [ ] Nutrition Supplement  [ ] Nutrition Support  [ ] Other:     Monitoring and Evaluation:   [x ] PO intake [ x ] Tolerance to diet prescription [ x ] weights [ x ] labs[ x ] follow up per protocol  [ ] other:

## 2020-10-23 NOTE — PROGRESS NOTE ADULT - SUBJECTIVE AND OBJECTIVE BOX
Patient is a 85y old  Male who presents with a chief complaint of right hip fx (22 Oct 2020 17:04)      INTERVAL HPI/OVERNIGHT EVENTS: no events     MEDICATIONS  (STANDING):  ascorbic acid 500 milliGRAM(s) Oral two times a day  dextrose 5% + sodium chloride 0.45% with potassium chloride 20 mEq/L 1000 milliLiter(s) (63 mL/Hr) IV Continuous <Continuous>  folic acid 1 milliGRAM(s) Oral daily  lactulose Syrup 20 Gram(s) Oral <User Schedule>  metoclopramide Injectable 10 milliGRAM(s) IV Push three times a day  midodrine. 5 milliGRAM(s) Oral three times a day  multivitamin 1 Tablet(s) Oral daily  pantoprazole  Injectable 40 milliGRAM(s) IV Push every 12 hours  rivaroxaban 20 milliGRAM(s) Oral with dinner  sucralfate 1 Gram(s) Oral four times a day    MEDICATIONS  (PRN):  acetaminophen   Tablet .. 975 milliGRAM(s) Oral every 8 hours PRN Moderate Pain (4 - 6)  benzocaine 15 mG/menthol 3.6 mG (Sugar-Free) Lozenge 1 Lozenge Oral every 8 hours PRN Sore Throat  calcium carbonate    500 mG (Tums) Chewable 2 Tablet(s) Chew every 4 hours PRN Heartburn  guaiFENesin   Syrup  (Sugar-Free) 200 milliGRAM(s) Oral every 6 hours PRN Cough  melatonin 3 milliGRAM(s) Oral at bedtime PRN Sleep  ondansetron Injectable 4 milliGRAM(s) IV Push every 6 hours PRN Nausea and/or Vomiting      Allergies    No Known Allergies    Intolerances           Vital Signs Last 24 Hrs  T(C): 36.9 (23 Oct 2020 06:00), Max: 37 (22 Oct 2020 23:11)  T(F): 98.4 (23 Oct 2020 06:00), Max: 98.6 (22 Oct 2020 23:11)  HR: 83 (23 Oct 2020 06:00) (82 - 89)  BP: 97/65 (23 Oct 2020 06:00) (97/65 - 100/58)  BP(mean): --  RR: 18 (23 Oct 2020 06:00) (18 - 19)  SpO2: 98% (23 Oct 2020 06:00) (97% - 98%)    PHYSICAL EXAM:  GENERAL: NAD, well-groomed, well-developed  HEAD:  Atraumatic, Normocephalic  EYES: EOMI, PERRLA, conjunctiva and sclera clear  ENMT: No tonsillar erythema, exudates, or enlargement; Moist mucous membranes, Good dentition, No lesions  NECK: Supple, No JVD, Normal thyroid  NERVOUS SYSTEM:  Alert & Oriented X3, Good concentration; Motor Strength 5/5 B/L upper and lower extremities; DTRs 2+ intact and symmetric  CHEST/LUNG: Clear to percussion bilaterally; No rales, rhonchi, wheezing, or rubs  HEART: Regular rate and rhythm; No murmurs, rubs, or gallops  ABDOMEN: Soft, Nontender, Nondistended; Bowel sounds present  EXTREMITIES:  2+ Peripheral Pulses, No clubbing, cyanosis, or edema  LYMPH: No lymphadenopathy noted  SKIN: No rashes or lesions    LABS:                        9.1    5.50  )-----------( 228      ( 22 Oct 2020 06:59 )             29.8     10-22    135  |  104  |  20  ----------------------------<  85  3.9   |  27  |  0.93    Ca    8.4<L>      22 Oct 2020 06:59  Mg     2.2     10-22          CAPILLARY BLOOD GLUCOSE          RADIOLOGY & ADDITIONAL TESTS:    Imaging Personally Reviewed:  [ X] YES  [ ] NO    Consultant(s) Notes Reviewed:  [ X] YES  [ ] NO    Care Discussed with Consultants/Other Providers [X ] YES  [ ] NO

## 2020-10-23 NOTE — PROGRESS NOTE ADULT - ASSESSMENT
HPI:  85M otherwise healthy presents to the ED for right hip pain following a mechanical fall. Pt was sitting down in a chair when he missed the chair falling onto his right hip, he experienced immediate right hip pain and swelling. He denies hit to the head, LOC, any other orthopedic injuries. Pt denies SOB, CP, calf pain. (05 Oct 2020 16:02)  ------------ As Above -------------  The patient presented with a mechanical fall and fractured his hip. S/P repair 10/6. Patient developed N/V on 10/10. NGT inserted this AM revealed 3 liters of fluid. CT Scan done afterwards revealed gastric distention > ileus.   Patient felt much better with NGT. No history of PUD. No GI symptoms on admission. On ASA 81 QD.  Prior to admission, the patient denies melena, hematochezia, hematemesis, nausea, vomiting, abdominal pain, constipation, diarrhea, or change in bowel movements Normal colonoscopy 2017.   Flatus but no BMs. Enema without any results.    Goo VS ileus-    Flatus (+), BM (+) . EGD essentially negative. Tolerating regular diet  1) decrease IV Reglan 5 TID 2)  Lactulose 3) Follow  electrolytes   Esophageal ulcer by EGD - biopsy was negative - PPI / Carafate  Constipation - On IV Reglan ( being tapered ) TSH normal   1) Lactulose

## 2020-10-23 NOTE — PROGRESS NOTE ADULT - REASON FOR ADMISSION
right hip fx
s/p fall --> right hip fx
right hip fx

## 2020-10-23 NOTE — PROGRESS NOTE ADULT - ASSESSMENT
84 y/o male with acute hip fracture and Afib.      illius vs gastric outlet obstruction  gastritis tolerating clears advance diet today   -Routine post surgical ortho care. pain controlled   -Staples to be removed POD 14  Ortho stable for DC, Outpatient follow up    -hypokalemia replaced      UTI   UA ++ LE and nitrite but minimal wbc and epithelial cells. ucx neg. remains afebrile   afebrile s/p three day course of levaquin     acute blood loss anemia. 2/2 surgery   - cbc stable       Afib  -Echo reviewed  - Pt  agreed to AC so would recommend a NOAC. He reports only falling this one time. understands risk of falling on AC   - restart  xarelto 20 mg daily   vomitng   -2/2 post op ileus vs gastric outlet syndrome s/p egd   aspiration PNA   - ct shows lower lobe opacities and wbc stable and afebrile. procalcitonin normal off Levaquin   renal mass   - incidentally found on ct  - needs follow up with urologist   - pt aware and reminded     DC TO [facility]

## 2020-10-23 NOTE — DISCHARGE NOTE NURSING/CASE MANAGEMENT/SOCIAL WORK - PATIENT PORTAL LINK FT
You can access the FollowMyHealth Patient Portal offered by [facility] by registering at the following website: [email or web address]. By joining [facility]’s FollowMyHealth portal, you will also be able to view your health information using other applications (apps) compatible with our system.

## 2020-10-23 NOTE — PROGRESS NOTE ADULT - SUBJECTIVE AND OBJECTIVE BOX
Patient is a 85y old  Male who presents with a chief complaint of right hip fx (23 Oct 2020 11:49)      HPI:  85M otherwise healthy presents to the ED for right hip pain following a mechanical fall. Pt was sitting down in a chair when he missed the chair falling onto his right hip, he experienced immediate right hip pain and swelling. He denies hit to the head, LOC, any other orthopedic injuries. Pt denies SOB, CP, calf pain. (05 Oct 2020 16:02)      INTERVAL HPI/OVERNIGHT EVENTS:  BM(+), Tolerating solid diet. The patient denies melena, hematochezia, hematemesis, nausea, vomiting, abdominal pain, constipation, diarrhea, or change in bowel movements     MEDICATIONS  (STANDING):  ascorbic acid 500 milliGRAM(s) Oral two times a day  dextrose 5% + sodium chloride 0.45% with potassium chloride 20 mEq/L 1000 milliLiter(s) (63 mL/Hr) IV Continuous <Continuous>  folic acid 1 milliGRAM(s) Oral daily  lactulose Syrup 20 Gram(s) Oral <User Schedule>  metoclopramide Injectable 10 milliGRAM(s) IV Push three times a day  midodrine. 5 milliGRAM(s) Oral three times a day  multivitamin 1 Tablet(s) Oral daily  pantoprazole  Injectable 40 milliGRAM(s) IV Push every 12 hours  rivaroxaban 20 milliGRAM(s) Oral with dinner  sucralfate 1 Gram(s) Oral four times a day    MEDICATIONS  (PRN):  acetaminophen   Tablet .. 975 milliGRAM(s) Oral every 8 hours PRN Moderate Pain (4 - 6)  benzocaine 15 mG/menthol 3.6 mG (Sugar-Free) Lozenge 1 Lozenge Oral every 8 hours PRN Sore Throat  calcium carbonate    500 mG (Tums) Chewable 2 Tablet(s) Chew every 4 hours PRN Heartburn  guaiFENesin   Syrup  (Sugar-Free) 200 milliGRAM(s) Oral every 6 hours PRN Cough  melatonin 3 milliGRAM(s) Oral at bedtime PRN Sleep  ondansetron Injectable 4 milliGRAM(s) IV Push every 6 hours PRN Nausea and/or Vomiting      FAMILY HISTORY:      Allergies    No Known Allergies    Intolerances        PMH/PSH:  No pertinent past medical history    No significant past surgical history          REVIEW OF SYSTEMS:  CONSTITUTIONAL: No fever, weight loss, or fatigue  EYES: No eye pain, visual disturbances, or discharge  ENMT:  No difficulty hearing, tinnitus, vertigo; No sinus or throat pain  NECK: No pain or stiffness  BREASTS: No pain, masses, or nipple discharge  RESPIRATORY: No cough, wheezing, chills or hemoptysis; No shortness of breath  CARDIOVASCULAR: No chest pain, palpitations, dizziness, or leg swelling  GASTROINTESTINAL: See above  GENITOURINARY: No dysuria, frequency, hematuria, or incontinence  NEUROLOGICAL: No headaches, memory loss, loss of strength, numbness, or tremors  SKIN: No itching, burning, rashes, or lesions   LYMPH NODES: No enlarged glands  ENDOCRINE: No heat or cold intolerance; No hair loss  MUSCULOSKELETAL: No joint pain or swelling; No muscle, back, or extremity pain  PSYCHIATRIC: No depression, anxiety, mood swings, or difficulty sleeping  HEME/LYMPH: No easy bruising, or bleeding gums  ALLERGY AND IMMUNOLOGIC: No hives or eczema    Vital Signs Last 24 Hrs  T(C): 36.4 (23 Oct 2020 12:04), Max: 37 (22 Oct 2020 23:11)  T(F): 97.6 (23 Oct 2020 12:04), Max: 98.6 (22 Oct 2020 23:11)  HR: 84 (23 Oct 2020 12:04) (82 - 89)  BP: 91/54 (23 Oct 2020 12:04) (91/54 - 100/58)  BP(mean): --  RR: 18 (23 Oct 2020 12:04) (18 - 19)  SpO2: 98% (23 Oct 2020 12:04) (97% - 98%)    PHYSICAL EXAM:  GENERAL: NAD, well-groomed, well-developed  HEAD:  Atraumatic, Normocephalic  EYES: EOMI, PERRLA, conjunctiva and sclera clear  NECK: Supple, No JVD, Normal thyroid  NERVOUS SYSTEM:  Alert & Oriented X3, Good concentration; Motor Strength 5/5 B/L upper and lower extremities;   CHEST/LUNG: Clear to percussion bilaterally; No rales, rhonchi, wheezing, or rubs  HEART: Regular rate and rhythm; No murmurs, rubs, or gallops  ABDOMEN: Soft, Nontender, Nondistended; Bowel sounds present  EXTREMITIES:  2+ Peripheral Pulses, No clubbing, cyanosis, or edema  LYMPH: No lymphadenopathy noted  SKIN: No rashes or lesions    LAB                          9.1    5.50  )-----------( 228      ( 22 Oct 2020 06:59 )             29.8       CBC:  10-22 @ 06:59  WBC 5.50   Hgb 9.1   Hct 29.8   Plts 228  .7  10-21 @ 07:58  WBC 5.91   Hgb 9.5   Hct 29.9   Plts 214  .0  10-20 @ 07:42  WBC 7.03   Hgb 9.8   Hct 31.1   Plts 208  .3  10-19 @ 06:40  WBC 5.99   Hgb 9.5   Hct 30.6   Plts 174  .3  10-18 @ 07:03  WBC 10.68   Hgb 10.2   Hct 33.3   Plts 200  .9  10-17 @ 06:51  WBC 7.67   Hgb 9.8   Hct 30.3   Plts 183  MCV 98.4      Chemistry:  10-22 @ 06:59  Na+ 135  K+ 3.9  Cl- 104  CO2 27  BUN 20  Cr 0.93     10-21 @ 07:58  Na+ 135  K+ 3.6  Cl- 102  CO2 27  BUN 23  Cr 0.90     10-20 @ 07:42  Na+ 138  K+ 4.0  Cl- 105  CO2 28  BUN 28  Cr 0.93     10-19 @ 06:40  Na+ 140  K+ 3.5  Cl- 106  CO2 29  BUN 24  Cr 0.84     10-18 @ 07:03  Na+ 140  K+ 3.3  Cl- 103  CO2 33  BUN 24  Cr 0.95     10-17 @ 06:50  Na+ 140  K+ 3.8  Cl- 102  CO2 33  BUN 24  Cr 0.85         Glucose, Serum: 85 mg/dL (10-22 @ 06:59)  Glucose, Serum: 110 mg/dL (10-21 @ 07:58)  Glucose, Serum: 90 mg/dL (10-20 @ 07:42)  Glucose, Serum: 83 mg/dL (10-19 @ 06:40)  Glucose, Serum: 88 mg/dL (10-18 @ 07:03)  Glucose, Serum: 92 mg/dL (10-17 @ 06:50)      22 Oct 2020 06:59    135    |  104    |  20     ----------------------------<  85     3.9     |  27     |  0.93   21 Oct 2020 07:58    135    |  102    |  23     ----------------------------<  110    3.6     |  27     |  0.90   20 Oct 2020 07:42    138    |  105    |  28     ----------------------------<  90     4.0     |  28     |  0.93   19 Oct 2020 06:40    140    |  106    |  24     ----------------------------<  83     3.5     |  29     |  0.84   18 Oct 2020 07:03    140    |  103    |  24     ----------------------------<  88     3.3     |  33     |  0.95   17 Oct 2020 06:50    140    |  102    |  24     ----------------------------<  92     3.8     |  33     |  0.85     Ca    8.4        22 Oct 2020 06:59  Ca    8.6        21 Oct 2020 07:58  Ca    8.9        20 Oct 2020 07:42  Ca    8.4        19 Oct 2020 06:40  Ca    8.7        18 Oct 2020 07:03  Ca    8.8        17 Oct 2020 06:50  Phos  3.1       21 Oct 2020 07:58  Phos  3.4       20 Oct 2020 07:42  Phos  3.0       19 Oct 2020 06:40  Phos  3.1       18 Oct 2020 07:03  Mg     2.2       22 Oct 2020 06:59  Mg     2.2       21 Oct 2020 07:58  Mg     2.0       20 Oct 2020 07:42  Mg     2.4       19 Oct 2020 06:40  Mg     2.3       18 Oct 2020 07:03    TPro  6.0    /  Alb  2.6    /  TBili  2.2    /  DBili  x      /  AST  39     /  ALT  33     /  AlkPhos  105    20 Oct 2020 07:42              CAPILLARY BLOOD GLUCOSE              RADIOLOGY & ADDITIONAL TESTS:    Imaging Personally Reviewed:  [ ] YES  [ ] NO    Consultant(s) Notes Reviewed:  [ ] YES  [ ] NO    Care Discussed with Consultants/Other Providers [ ] YES  [ ] NO

## 2020-10-23 NOTE — CHART NOTE - NSCHARTNOTESELECT_GEN_ALL_CORE
Malnutrition Notification
Event Note
Nutrition Services

## 2020-10-26 DIAGNOSIS — M54.30 SCIATICA, UNSPECIFIED SIDE: ICD-10-CM
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
## 2020-10-26 DIAGNOSIS — K29.00 ACUTE GASTRITIS WITHOUT BLEEDING: ICD-10-CM
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
## 2020-10-26 DIAGNOSIS — K56.7 ILEUS, UNSPECIFIED: ICD-10-CM

## 2020-10-26 DIAGNOSIS — K20.90 ESOPHAGITIS, UNSPECIFIED WITHOUT BLEEDING: ICD-10-CM

## 2020-10-26 DIAGNOSIS — I48.11 LONGSTANDING PERSISTENT ATRIAL FIBRILLATION: ICD-10-CM

## 2020-10-26 DIAGNOSIS — S72.21XA DISPLACED SUBTROCHANTERIC FRACTURE OF RIGHT FEMUR, INITIAL ENCOUNTER FOR CLOSED FRACTURE: ICD-10-CM
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
## 2020-10-26 DIAGNOSIS — N39.0 URINARY TRACT INFECTION, SITE NOT SPECIFIED: ICD-10-CM

## 2020-10-26 DIAGNOSIS — E87.6 HYPOKALEMIA: ICD-10-CM

## 2020-10-26 DIAGNOSIS — J69.0 PNEUMONITIS DUE TO INHALATION OF FOOD AND VOMIT: ICD-10-CM

## 2020-10-26 DIAGNOSIS — I10 ESSENTIAL (PRIMARY) HYPERTENSION: ICD-10-CM

## 2020-10-26 DIAGNOSIS — D62 ACUTE POSTHEMORRHAGIC ANEMIA: ICD-10-CM

## 2020-11-13 ENCOUNTER — OUTPATIENT (OUTPATIENT)
Dept: OUTPATIENT SERVICES | Facility: HOSPITAL | Age: 85
LOS: 1 days | Discharge: HOME | End: 2020-11-13
Payer: MEDICARE

## 2020-11-13 DIAGNOSIS — I82.402 ACUTE EMBOLISM AND THROMBOSIS OF UNSPECIFIED DEEP VEINS OF LEFT LOWER EXTREMITY: ICD-10-CM

## 2020-11-13 PROCEDURE — 93971 EXTREMITY STUDY: CPT | Mod: 26,RT

## 2020-11-18 ENCOUNTER — OUTPATIENT (OUTPATIENT)
Dept: OUTPATIENT SERVICES | Facility: HOSPITAL | Age: 85
LOS: 1 days | Discharge: HOME | End: 2020-11-18
Payer: MEDICARE

## 2020-11-18 DIAGNOSIS — S72.21XD DISPLACED SUBTROCHANTERIC FRACTURE OF RIGHT FEMUR, SUBSEQUENT ENCOUNTER FOR CLOSED FRACTURE WITH ROUTINE HEALING: ICD-10-CM

## 2020-11-18 PROCEDURE — 73552 X-RAY EXAM OF FEMUR 2/>: CPT | Mod: 26,RT

## 2020-11-18 PROCEDURE — 73502 X-RAY EXAM HIP UNI 2-3 VIEWS: CPT | Mod: 26,RT

## 2020-11-23 NOTE — BRIEF OPERATIVE NOTE - NSICDXBRIEFPOSTOP_GEN_ALL_CORE_FT
POST-OP DIAGNOSIS:  Erosive esophagitis 19-Oct-2020 14:23:55  Chaitanya Simmons  Gastritis 19-Oct-2020 14:23:45  Chaitanya Simmons  
Artificial joint/Lens implant

## 2020-12-03 RX ORDER — RIVAROXABAN 15 MG-20MG
1 KIT ORAL
Qty: 30 | Refills: 0
Start: 2020-12-03 | End: 2021-01-01

## 2020-12-16 ENCOUNTER — APPOINTMENT (OUTPATIENT)
Dept: ORTHOPEDIC SURGERY | Facility: CLINIC | Age: 85
End: 2020-12-16
Payer: MEDICARE

## 2020-12-16 PROCEDURE — 73502 X-RAY EXAM HIP UNI 2-3 VIEWS: CPT | Mod: RT

## 2020-12-16 PROCEDURE — 99024 POSTOP FOLLOW-UP VISIT: CPT

## 2020-12-16 PROCEDURE — 73552 X-RAY EXAM OF FEMUR 2/>: CPT | Mod: RT

## 2020-12-16 NOTE — HISTORY OF PRESENT ILLNESS
[de-identified] : R hip [de-identified] : 86yo M s/p Long cephalomedullary nailing of right subtrochanteric fracture, 10/6/20. He was at [facility] for rehab.  now home.  walkign w walker.  c/o mild pain [de-identified] : right shoulder exam.\par inc healed well. no erythema\par no pain gentle passive rom hip or knee\par able to flex/ext all fingers\par silt r/u/m/ax\par bcr\par  [de-identified] : \par The following radiographs were ordered and read by me during this patients visit. I reviewed each radiograph in detail with the patient and discussed the findings as highlighted below. \par \par AP pelvis AP lateral right hip AP lateral right femur were obtained today. Interval here in a subtrochanteric femur fracture with intramedullary hardware in place\par  [de-identified] : 86yo M s/p Long cephalomedullary nailing of right subtrochanteric fracture, 10/6/20. [de-identified] : \par Healed hip fracture. Weightbearing as tolerated physical therapy gait training range of motion follow up 2 months

## 2020-12-23 ENCOUNTER — APPOINTMENT (OUTPATIENT)
Dept: ORTHOPEDIC SURGERY | Facility: CLINIC | Age: 85
End: 2020-12-23

## 2021-02-17 ENCOUNTER — APPOINTMENT (OUTPATIENT)
Dept: ORTHOPEDIC SURGERY | Facility: CLINIC | Age: 86
End: 2021-02-17
Payer: MEDICARE

## 2021-02-17 DIAGNOSIS — S72.21XD DISPLACED SUBTROCHANTERIC FRACTURE OF RIGHT FEMUR, SUBSEQUENT ENCOUNTER FOR CLOSED FRACTURE WITH ROUTINE HEALING: ICD-10-CM

## 2021-02-17 PROCEDURE — 73552 X-RAY EXAM OF FEMUR 2/>: CPT | Mod: RT

## 2021-02-17 PROCEDURE — 99213 OFFICE O/P EST LOW 20 MIN: CPT

## 2021-02-17 PROCEDURE — 73502 X-RAY EXAM HIP UNI 2-3 VIEWS: CPT | Mod: RT

## 2021-02-17 PROCEDURE — 99072 ADDL SUPL MATRL&STAF TM PHE: CPT

## 2021-02-17 NOTE — DISCUSSION/SUMMARY
[de-identified] : 4 months status post subtrochanteric femur fracture which is healed. He is weightbearing as tolerated. He'll continue some exercises physical therapy exercises. He may follow up as needed. All questions answered.

## 2021-02-17 NOTE — PHYSICAL EXAM
[de-identified] : Right hip exam\par \par Skin: Clean/dry and intact\par Inspection: No obvious deformity, no swelling, no ecchymosis.\par Tenderness:  no tenderness over greater trochanter/glut medius insertion. No tenderness pubic symphysis, pubic tubercle, hip flexors. No ttp ischial tuberosity or buttock. No ttp over the ASIS/Illiac crest.\par ROM: 0-120°. Internal rotation 30 external rotation 70\par Painful ROM: None\par Strength: 5/5 hip flexion/ADD/ABD/Q/H/TA/GS/EHL\par Neuro: Sensation in tact to light touch throughout in dp/sp/tib/matthew/saph distributions\par Pulses: 2+ DP/PT pulses [de-identified] : \par The following radiographs were ordered and read by me during this patients visit. I reviewed each radiograph in detail with the patient and discussed the findings as highlighted below. \par \par AP lateral right hip AP lateral right femur were obtained today. There is a healed subtrochanteric hip fracture. Hardware in good position\par \par

## 2021-02-17 NOTE — HISTORY OF PRESENT ILLNESS
[de-identified] : 84yo M s/p Long cephalomedullary nailing of right subtrochanteric fracture, 10/6/20.  he is doing well overall.  Ambulating with walker.  Reports soreness throughout hip.  Pain controlled.  Denies numbness/tingling\par \par .He is walking with a walker he is doing home exercises

## 2021-09-15 ENCOUNTER — NON-APPOINTMENT (OUTPATIENT)
Age: 86
End: 2021-09-15

## 2021-10-06 ENCOUNTER — NON-APPOINTMENT (OUTPATIENT)
Age: 86
End: 2021-10-06

## 2021-10-31 NOTE — PROGRESS NOTE ADULT - SUBJECTIVE AND OBJECTIVE BOX
Surgery NP note  Patient seen and examined bedside with Dr Ruffin  C/O NGT discomfort. No Abdominal pain  Denies nausea and vomiting. NGT to LWS  Normal flatus no BM    T(F): 98.2 (10-16-20 @ 17:41), Max: 98.7 (10-15-20 @ 23:30)  HR: 98 (10-16-20 @ 17:41) (90 - 108)  BP: 106/63 (10-16-20 @ 17:41) (91/58 - 111/63)  RR: 18 (10-16-20 @ 17:41) (18 - 18)  SpO2: 98% (10-16-20 @ 17:41) (96% - 98%)  Wt(kg): --  CAPILLARY BLOOD GLUCOSE          PHYSICAL EXAM:  General: NAD, WDWN.   Neuro:  Alert & responsive  HEENT: NCAT, EOMI, conjunctiva clear. NGT with Gastric secretions  CV: +S1+S2 regular rate and rhythm  Lung: clear to ausculation bilaterally, respirations nonlabored, good inspiratory effort  Abdomen: soft, Non tender, No Distension. Normal active BS  Extremities: no pedal edema or calf tenderness noted     LABS:                        8.9    8.12  )-----------( 188      ( 16 Oct 2020 07:30 )             27.2     10-16    139  |  104  |  25<H>  ----------------------------<  102<H>  3.1<L>   |  32<H>  |  0.86    Ca    8.8      16 Oct 2020 07:30  Phos  2.7     10-16  Mg     1.9     10-16          I&O's Detail    15 Oct 2020 07:01  -  16 Oct 2020 07:00  --------------------------------------------------------  IN:    dextrose 5% + lactated ringers: 900 mL  Total IN: 900 mL    OUT:    Nasogastric/Oral tube (mL): 1800 mL  Total OUT: 1800 mL    Total NET: -900 mL      16 Oct 2020 07:01  -  16 Oct 2020 18:55  --------------------------------------------------------  IN:  Total IN: 0 mL    OUT:    Nasogastric/Oral tube (mL): 600 mL  Total OUT: 600 mL    Total NET: -600 mL        Assessment: 85y Male admitted with  acute hip fracture s/p RIGHT hip IMN, seen with postop ileus rule out GOO  Hypokalemia  - GI f/u   -Continue NPO diet and maintenance IVF; medical management and supportive care. Discussed with Dr Pires.  -replete electrolytes as needed  -No acute surgical intervention warranted at this time.  -Continue postop ortho care  -discussed with Dr Ruffin             Yes

## 2022-06-21 NOTE — DISCHARGE NOTE NURSING/CASE MANAGEMENT/SOCIAL WORK - NSDCPNDISPN_GEN_ALL_CORE
Phone call to patient. Detailed message left advising patient of estrogen level from this morning. Patient advised to follow instructions given in the office today and return to clinic on 6/28/22 for frozen embryo transfer.   Side effects of pain management treatment/Activities of daily living, including home environment that might     exacerbate pain or reduce effectiveness of the pain management plan of care as well as strategies to address these issues/Education provided on the pain management plan of care

## 2023-03-10 ENCOUNTER — EMERGENCY (EMERGENCY)
Facility: HOSPITAL | Age: 88
LOS: 0 days | Discharge: HOME | End: 2023-03-10
Attending: EMERGENCY MEDICINE
Payer: MEDICARE

## 2023-03-10 VITALS
SYSTOLIC BLOOD PRESSURE: 138 MMHG | RESPIRATION RATE: 18 BRPM | OXYGEN SATURATION: 96 % | TEMPERATURE: 98 F | HEART RATE: 81 BPM | DIASTOLIC BLOOD PRESSURE: 91 MMHG

## 2023-03-10 VITALS
HEART RATE: 94 BPM | RESPIRATION RATE: 18 BRPM | OXYGEN SATURATION: 100 % | TEMPERATURE: 98 F | WEIGHT: 238.98 LBS | HEIGHT: 75 IN | SYSTOLIC BLOOD PRESSURE: 154 MMHG | DIASTOLIC BLOOD PRESSURE: 84 MMHG

## 2023-03-10 DIAGNOSIS — Z96.641 PRESENCE OF RIGHT ARTIFICIAL HIP JOINT: Chronic | ICD-10-CM

## 2023-03-10 DIAGNOSIS — R31.9 HEMATURIA, UNSPECIFIED: ICD-10-CM

## 2023-03-10 DIAGNOSIS — Z79.82 LONG TERM (CURRENT) USE OF ASPIRIN: ICD-10-CM

## 2023-03-10 DIAGNOSIS — S30.0XXA CONTUSION OF LOWER BACK AND PELVIS, INITIAL ENCOUNTER: ICD-10-CM

## 2023-03-10 DIAGNOSIS — N28.89 OTHER SPECIFIED DISORDERS OF KIDNEY AND URETER: ICD-10-CM

## 2023-03-10 DIAGNOSIS — E87.5 HYPERKALEMIA: ICD-10-CM
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
## 2023-03-10 DIAGNOSIS — Z96.641 PRESENCE OF RIGHT ARTIFICIAL HIP JOINT: ICD-10-CM

## 2023-03-10 DIAGNOSIS — M54.50 LOW BACK PAIN, UNSPECIFIED: ICD-10-CM

## 2023-03-10 LAB
ALBUMIN SERPL ELPH-MCNC: 3.5 G/DL — SIGNIFICANT CHANGE UP (ref 3.3–5)
ALP SERPL-CCNC: 91 U/L — SIGNIFICANT CHANGE UP (ref 40–120)
ALT FLD-CCNC: 42 U/L — SIGNIFICANT CHANGE UP (ref 12–78)
ANION GAP SERPL CALC-SCNC: 8 MMOL/L — SIGNIFICANT CHANGE UP (ref 5–17)
APPEARANCE UR: ABNORMAL
APTT BLD: 31.8 SEC — SIGNIFICANT CHANGE UP (ref 27.5–35.5)
AST SERPL-CCNC: 47 U/L — HIGH (ref 15–37)
BACTERIA # UR AUTO: ABNORMAL
BASOPHILS # BLD AUTO: 0.01 K/UL — SIGNIFICANT CHANGE UP (ref 0–0.2)
BASOPHILS NFR BLD AUTO: 0.2 % — SIGNIFICANT CHANGE UP (ref 0–2)
BILIRUB SERPL-MCNC: 0.5 MG/DL — SIGNIFICANT CHANGE UP (ref 0.2–1.2)
BILIRUB UR-MCNC: NEGATIVE — SIGNIFICANT CHANGE UP
BUN SERPL-MCNC: 39 MG/DL — HIGH (ref 7–23)
CALCIUM SERPL-MCNC: 9.5 MG/DL — SIGNIFICANT CHANGE UP (ref 8.5–10.1)
CHLORIDE SERPL-SCNC: 105 MMOL/L — SIGNIFICANT CHANGE UP (ref 96–108)
CO2 SERPL-SCNC: 25 MMOL/L — SIGNIFICANT CHANGE UP (ref 22–31)
COLOR SPEC: ABNORMAL
CREAT SERPL-MCNC: 1.13 MG/DL — SIGNIFICANT CHANGE UP (ref 0.5–1.3)
DIFF PNL FLD: ABNORMAL
EGFR: 63 ML/MIN/1.73M2 — SIGNIFICANT CHANGE UP
EOSINOPHIL # BLD AUTO: 0.05 K/UL — SIGNIFICANT CHANGE UP (ref 0–0.5)
EOSINOPHIL NFR BLD AUTO: 0.9 % — SIGNIFICANT CHANGE UP (ref 0–6)
EPI CELLS # UR: SIGNIFICANT CHANGE UP
GLUCOSE SERPL-MCNC: 101 MG/DL — HIGH (ref 70–99)
GLUCOSE UR QL: NEGATIVE MG/DL — SIGNIFICANT CHANGE UP
HCT VFR BLD CALC: 43.2 % — SIGNIFICANT CHANGE UP (ref 39–50)
HGB BLD-MCNC: 13.7 G/DL — SIGNIFICANT CHANGE UP (ref 13–17)
IMM GRANULOCYTES NFR BLD AUTO: 0.3 % — SIGNIFICANT CHANGE UP (ref 0–0.9)
INR BLD: 1.07 RATIO — SIGNIFICANT CHANGE UP (ref 0.88–1.16)
KETONES UR-MCNC: ABNORMAL
LEUKOCYTE ESTERASE UR-ACNC: ABNORMAL
LYMPHOCYTES # BLD AUTO: 1.19 K/UL — SIGNIFICANT CHANGE UP (ref 1–3.3)
LYMPHOCYTES # BLD AUTO: 20.8 % — SIGNIFICANT CHANGE UP (ref 13–44)
MCHC RBC-ENTMCNC: 31.4 PG — SIGNIFICANT CHANGE UP (ref 27–34)
MCHC RBC-ENTMCNC: 31.7 G/DL — LOW (ref 32–36)
MCV RBC AUTO: 98.9 FL — SIGNIFICANT CHANGE UP (ref 80–100)
MONOCYTES # BLD AUTO: 0.48 K/UL — SIGNIFICANT CHANGE UP (ref 0–0.9)
MONOCYTES NFR BLD AUTO: 8.4 % — SIGNIFICANT CHANGE UP (ref 2–14)
NEUTROPHILS # BLD AUTO: 3.98 K/UL — SIGNIFICANT CHANGE UP (ref 1.8–7.4)
NEUTROPHILS NFR BLD AUTO: 69.4 % — SIGNIFICANT CHANGE UP (ref 43–77)
NITRITE UR-MCNC: POSITIVE
NRBC # BLD: 0 /100 WBCS — SIGNIFICANT CHANGE UP (ref 0–0)
PH UR: 5 — SIGNIFICANT CHANGE UP (ref 5–8)
PLATELET # BLD AUTO: 185 K/UL — SIGNIFICANT CHANGE UP (ref 150–400)
POTASSIUM SERPL-MCNC: 5.5 MMOL/L — HIGH (ref 3.5–5.3)
POTASSIUM SERPL-SCNC: 5.5 MMOL/L — HIGH (ref 3.5–5.3)
PROT SERPL-MCNC: 7.5 GM/DL — SIGNIFICANT CHANGE UP (ref 6–8.3)
PROT UR-MCNC: 100 MG/DL
PROTHROM AB SERPL-ACNC: 12.7 SEC — SIGNIFICANT CHANGE UP (ref 10.5–13.4)
RBC # BLD: 4.37 M/UL — SIGNIFICANT CHANGE UP (ref 4.2–5.8)
RBC # FLD: 13.4 % — SIGNIFICANT CHANGE UP (ref 10.3–14.5)
RBC CASTS # UR COMP ASSIST: >50 /HPF (ref 0–4)
SODIUM SERPL-SCNC: 138 MMOL/L — SIGNIFICANT CHANGE UP (ref 135–145)
SP GR SPEC: 1.02 — SIGNIFICANT CHANGE UP (ref 1.01–1.02)
UROBILINOGEN FLD QL: 1 MG/DL
WBC # BLD: 5.73 K/UL — SIGNIFICANT CHANGE UP (ref 3.8–10.5)
WBC # FLD AUTO: 5.73 K/UL — SIGNIFICANT CHANGE UP (ref 3.8–10.5)
WBC UR QL: SIGNIFICANT CHANGE UP

## 2023-03-10 PROCEDURE — 72190 X-RAY EXAM OF PELVIS: CPT | Mod: 26

## 2023-03-10 PROCEDURE — 70450 CT HEAD/BRAIN W/O DYE: CPT | Mod: 26,MA

## 2023-03-10 PROCEDURE — 74177 CT ABD & PELVIS W/CONTRAST: CPT | Mod: 26,MA

## 2023-03-10 PROCEDURE — 99284 EMERGENCY DEPT VISIT MOD MDM: CPT

## 2023-03-10 PROCEDURE — 72125 CT NECK SPINE W/O DYE: CPT | Mod: 26,MA

## 2023-03-10 RX ORDER — SODIUM ZIRCONIUM CYCLOSILICATE 10 G/10G
5 POWDER, FOR SUSPENSION ORAL ONCE
Refills: 0 | Status: COMPLETED | OUTPATIENT
Start: 2023-03-10 | End: 2023-03-10

## 2023-03-10 RX ORDER — CEFUROXIME AXETIL 250 MG
250 TABLET ORAL ONCE
Refills: 0 | Status: COMPLETED | OUTPATIENT
Start: 2023-03-10 | End: 2023-03-10

## 2023-03-10 RX ORDER — CEFTRIAXONE 500 MG/1
1000 INJECTION, POWDER, FOR SOLUTION INTRAMUSCULAR; INTRAVENOUS ONCE
Refills: 0 | Status: DISCONTINUED | OUTPATIENT
Start: 2023-03-10 | End: 2023-03-10

## 2023-03-10 RX ORDER — CEFUROXIME AXETIL 250 MG
1 TABLET ORAL
Qty: 14 | Refills: 0
Start: 2023-03-10 | End: 2023-03-16

## 2023-03-10 RX ADMIN — SODIUM ZIRCONIUM CYCLOSILICATE 5 GRAM(S): 10 POWDER, FOR SUSPENSION ORAL at 21:35

## 2023-03-10 RX ADMIN — Medication 250 MILLIGRAM(S): at 21:44

## 2023-03-10 NOTE — ED PROVIDER NOTE - PROVIDER TOKENS
PROVIDER:[TOKEN:[[id number]],FOLLOWUP:[1-3 Days]],PROVIDER:[TOKEN:[[id number]],FOLLOWUP:[1-3 Days]]

## 2023-03-10 NOTE — ED ADULT TRIAGE NOTE - CHIEF COMPLAINT QUOTE
pt presents to the ED with c/o bloody urine and a fall on sunday, did not strike his head but is c/o left buttock pain

## 2023-03-10 NOTE — ED PROVIDER NOTE - CARE PROVIDERS DIRECT ADDRESSES
[email or web address]

## 2023-03-10 NOTE — ED PROVIDER NOTE - PATIENT PORTAL LINK FT
You can access the FollowMyHealth Patient Portal offered by [facility] by registering at the following website: [email or web address]. By joining [facility]’s FollowMyHealth portal, you will also be able to view your health information using other applications (apps) compatible with our system.

## 2023-03-10 NOTE — ED ADULT NURSE NOTE - OBJECTIVE STATEMENT
a/o x4 c/o blood in his urine since Wednesday morning, denies pains. a/o x4, responsive, ambulatory. pt c/o blood in his urine since Wednesday morning, denies pains. denies fever/chills, n/v/d, dizziness or headache. pt also states he had a fall on Sunday, positive for head trauma on the back of his head; but is c/o left buttock pain; presents with bruising on left buttock. pmh stomach ulcer. states he is on aspirin 81.

## 2023-03-10 NOTE — ED ADULT NURSE NOTE - NSIMPLEMENTINTERV_GEN_ALL_ED
Implemented All Fall with Harm Risk Interventions:  [place] to call system. Call bell, personal items and telephone within reach. Instruct patient to call for assistance. Room bathroom lighting operational. Non-slip footwear when patient is off stretcher. Physically safe environment: no spills, clutter or unnecessary equipment. Stretcher in lowest position, wheels locked, appropriate side rails in place. Provide visual cue, wrist band, yellow gown, etc. Monitor gait and stability. Monitor for mental status changes and reorient to person, place, and time. Review medications for side effects contributing to fall risk. Reinforce activity limits and safety measures with patient and family. Provide visual clues: red socks.

## 2023-03-10 NOTE — ED PROVIDER NOTE - OBJECTIVE STATEMENT
89 y/o male with history right hip replacement on ASA here with hematuria x 2 days. Pt reports dark red bloody urine. Denies dysuria, abdominal pain, vomiting, fever, chills, back pain. Pt denies history of any prostate issues. Pt also reports having left buttock pain after mechanical fall 5 days ago. Pt states he tripped over his foot and fell on his buttock. Reports hitting his head. Denies LOC, headache, dizziness. Pt was able to get up and ambulate afterwards with his walker which is his baseline. Denies numbness, tingling, or any other injuries. Pt states he has chronic swelling to his bilateral LE. Denies pain.

## 2023-03-10 NOTE — ED PROVIDER NOTE - RELIEVING FACTORS
Patient Information     Patient Name MRN Sex Dilip Lomax [id number] Male 1953      Telephone Encounter by Marco A Aburto RN at 3/8/2017  2:37 PM     Author:  Marco A Aburto RN Service:  (none) Author Type:  NURS- Registered Nurse     Filed:  3/8/2017  3:07 PM Encounter Date:  3/8/2017 Status:  Signed     :  Marco A Aburto RN (NURS- Registered Nurse)            Call received from freddie Steinberg at [facility]. Idris reports that patient had dropped off an Rx for Morphine today to be filled. Idris has concerns that the RX was tampered with as rx noted to have a Blue ink signature by PCP, Ok to fill on  is lined out with black ink and  is written with black ink. Rx was written on 17 per Idris. Chart review shows that patient was given an Rx for morphine on 17 with a fill date of 3/31/17. Writer did not see any indication in chart that this fill date was altered by PCP, however patient was seen by PCP today in the office. freddie Steinberg states that patient is coming back to  rx in about 15 minutes. Call was received at 1437. Call placed to Dr. Roberts to discuss [facility]. Dr. Roberts states that he indeed did alter patient's hydrocodone rx today to reflect a 2017 fill date, but did not alter patient's Morphine rx. Dr. Roberts states that he does approve a fill date of patient's hydrocodone of today, but doesn't authorize a fill date of Morphine of today, would like that date to remain at 3/31/17. [facility] Confirmed that rx that pharmacist has is indeed Morphine. Pharmacist confirms this, and reports that he doesn't have a hydrocodone rx for patient to fill at this time. Writer advised pharmacist of Dr. Roberts's instructions and which medication can be filled today. Pharmacist states that he will have to wait until patient presents to pharmacy to  his rx. Will discuss with patient Dr. Roberts's instructions  at that time. Pharmacist states that if he has difficulty with patient, he will call [facility] back. Writer will forward this encounter to PCP as an FYI as to notify him that Morphine fill date may have been altered.    Marco A Aburto RN ....................  3/8/2017   3:03 PM           none

## 2023-03-10 NOTE — ED PROVIDER NOTE - NS ED ROS FT
CONSTITUTIONAL: No fever, no chills, no fatigue  EYES: No visual changes  ENT: No ear pain, no sore throat  CARDIOVASCULAR: No chest pain, no palpitations  RESPIRATORY: No cough, no SOB  GI: No abdominal pain, no nausea, no vomiting, no constipation, no diarrhea  GENITOURINARY: No dysuria, no frequency.   MUSKULOSKELETAL: No back pain, no joint pain.   SKIN: No rash  NEURO: No headache    ALL OTHER SYSTEMS NEGATIVE.

## 2023-03-10 NOTE — ED PROVIDER NOTE - ATTENDING CONTRIBUTION TO CARE
Patient evaluated and seen with PA agree with above history and physical - pt examined and seen by me personally - findings as seen: pt with painless hematuria x 3 days otherwise well appearing noted to have CT finding of renal mass, bladder mass - discussed need for urology followup in near future - otherwise possible UTI will treat with cefuroxime.

## 2023-03-10 NOTE — ED PROVIDER NOTE - PHYSICAL EXAMINATION
GEN: Awake, alert, interactive, NAD.  HEAD AND NECK: NC/AT. Airway patent. Neck supple.   EYES:  Clear b/l. EOMI. PERRL.   ENT: Moist mucus membranes.   CARDIAC: Regular rate, regular rhythm. (+) 1 pedal edema.   RESP/CHEST: Normal respiratory effort with no use of accessory muscles or retractions. Clear throughout on auscultation.  ABD: soft, non-distended, non-tender. No rebound, no guarding.   BACK: No midline spinal TTP. No CVAT.   BUTTOCK: (+) mild bruising to the left buttock. NO tenderness.   EXTREMITIES: Moving all extremities with no apparent deformities.   SKIN: Warm, dry, intact normal color. No rash.   NEURO: AOx3, CN II-XII grossly intact, no focal deficits.   PSYCH: Appropriate mood and affect.

## 2023-03-10 NOTE — ED PROVIDER NOTE - CLINICAL SUMMARY MEDICAL DECISION MAKING FREE TEXT BOX
89 y/o male with history right hip replacement here with hematuria x 2 days. NO abdominal pain or dysuria. Pt also with mechanical fall 5 days ago.   Will check labs, UA r/o infection and obtain ct abdomen/pelvis, ct head / c spine and reassess. 87 y/o male with history right hip replacement here with hematuria x 2 days. NO abdominal pain or dysuria. Pt also with mechanical fall 5 days ago.   Will check labs, UA r/o infection and obtain ct abdomen/pelvis, ct head / c spine and reassess.    labs reviewed and mild elevated k 5.5 slightly hemolyzed will treat with lokelma. Normal creatinine. UA positive for UTI, will treat with abx.   CT head/neck no acute findings. Ct a/p shows renal mass and irregular bladder which has increased in size since 2020. Pt made aware of the results advised to follow up with urology and PMD.     Pt stable to be discharged home.   Rx cefuroxime sent to pharmacy.

## 2023-03-10 NOTE — ED PROVIDER NOTE - CARE PROVIDER_API CALL
Dong Billingsley (MD)  Urology  [address] [facility], [address] [place] [address]  Phone: [phone]  Fax: [phone]  Follow Up Time: 1-3 Days    Demetrius Woodard)  Urology  [address] [facility], [address] [place] [address]  Phone: [phone]  Fax: [phone]  Follow Up Time: 1-3 Days

## 2023-03-10 NOTE — ED PROVIDER NOTE - NSFOLLOWUPINSTRUCTIONS_ED_ALL_ED_FT
Renal Mass       A renal mass is an abnormal growth in the kidney. It may be found while performing an MRI, CT scan, or ultrasound to evaluate other problems of the abdomen. A renal mass that is cancerous (malignant) may grow or spread quickly. Others are not cancerous (benign).    Renal masses include:•Tumors. These may be malignant or benign.  •The most common type of kidney cancer in adults is renal cell carcinoma. In children, the most common type of kidney cancer is Wilms tumor.      •The most common benign tumors of the kidney include renal adenomas, oncocytomas, and angiomyolipoma (AML).        •Cysts. These are fluid-filled sacs that form on or in the kidney.        What are the causes?    Certain types of cancers, infections, or injuries can cause a renal mass. It is not always known what causes a cyst to develop in or on the kidney.      What are the signs or symptoms?    Often, a renal mass does not cause any signs or symptoms; most kidney cysts do not cause symptoms.      How is this diagnosed?    Your health care provider may recommend tests to diagnose the cause of your renal mass. These tests may be done if a renal mass is found:  •Physical exam.      •Blood tests.      •Urine tests.      •Imaging tests, such as ultrasound, CT scan, or MRI.      •Biopsy. This is a small sample that is removed from the renal mass and tested in a lab.      The exact tests and how often they are done will depend on:  •The size and appearance of the renal mass.      •Risk factors or medical conditions that increase your risk for problems.      •Any symptoms associated with the renal mass, or concerns that you have about it.      Tests and physical exams may be done once, or they may be done regularly for a period of time. Tests and exams that are done regularly will help monitor whether the mass is growing and beginning to cause problems.      How is this treated?    Treatment is not always needed for this condition. Your health care provider may recommend careful monitoring and regular tests and exams. Treatment will depend on the cause of the mass.    Treatment for a cancerous renal mass may include surgical removal, chemotherapy, radiation, or immunotherapy.    Most kidney cysts do not need to be treated.      Follow these instructions at home:    What you need to do at home will depend on the cause of the mass. Follow the instructions that your health care provider gives to you. In general:  •Take over-the-counter and prescription medicines only as told by your health care provider.      •If you were prescribed an antibiotic medicine, take it as told by your health care provider. Do not stop taking the antibiotic even if you start to feel better.      •Follow any restrictions that are given to you by your health care provider.    •Keep all follow-up visits. This is important.  •You may need to see your health care provider once or twice a year to have CT scans and ultrasounds. These tests will show if your renal mass has changed or grown.          Contact a health care provider if you:    •Have pain in your side or back (flank pain).      •Have a fever.      •Feel full soon after eating.      •Have pain or swelling in the abdomen.      •Lose weight.        Get help right away if:    •Your pain gets worse.      •There is blood in your urine.      •You cannot urinate.      •You have chest pain.      •You have trouble breathing.      These symptoms may represent a serious problem that is an emergency. Do not wait to see if the symptoms will go away. Get medical help right away. Call your local emergency services (911 in the [place]       Summary    •A renal mass is an abnormal growth in the kidney. It may be cancerous (malignant) and grow or spread quickly, or it may not be cancerous (benign). Renal masses often do not have any signs or symptoms.      •Renal masses may be found while performing an MRI, CT scan, or ultrasound for other problems of the abdomen.      •Your health care provider may recommend that you have tests to diagnose the cause of your renal mass. These may include a physical exam, blood tests, urine tests, imaging, or a biopsy.      •Treatment is not always needed for this condition. Careful monitoring may be recommended.      This information is not intended to replace advice given to you by your health care provider. Make sure you discuss any questions you have with your health care provider.

## 2023-03-12 LAB
CULTURE RESULTS: NO GROWTH — SIGNIFICANT CHANGE UP
SPECIMEN SOURCE: SIGNIFICANT CHANGE UP

## 2023-03-14 ENCOUNTER — APPOINTMENT (OUTPATIENT)
Dept: UROLOGY | Facility: CLINIC | Age: 88
End: 2023-03-14
Payer: MEDICARE

## 2023-03-14 VITALS
WEIGHT: 233 LBS | BODY MASS INDEX: 28.97 KG/M2 | TEMPERATURE: 97.9 F | OXYGEN SATURATION: 96 % | HEIGHT: 75 IN | SYSTOLIC BLOOD PRESSURE: 130 MMHG | DIASTOLIC BLOOD PRESSURE: 87 MMHG | HEART RATE: 92 BPM

## 2023-03-14 DIAGNOSIS — R31.0 GROSS HEMATURIA: ICD-10-CM

## 2023-03-14 PROCEDURE — 81003 URINALYSIS AUTO W/O SCOPE: CPT | Mod: QW

## 2023-03-14 PROCEDURE — 52000 CYSTOURETHROSCOPY: CPT

## 2023-03-14 PROCEDURE — 99204 OFFICE O/P NEW MOD 45 MIN: CPT | Mod: 25

## 2023-03-14 NOTE — REVIEW OF SYSTEMS
[History of kidney stones] : history of kidney stones [Leakage of urine with straining, coughing, laughing] : leakage of urine with straining, coughing, laughing [Joint Swelling] : joint swelling [Difficulty Walking] : difficulty walking [Negative] : Heme/Lymph [see HPI] : see HPI

## 2023-03-21 ENCOUNTER — LABORATORY RESULT (OUTPATIENT)
Age: 88
End: 2023-03-21

## 2023-03-21 ENCOUNTER — NON-APPOINTMENT (OUTPATIENT)
Age: 88
End: 2023-03-21

## 2023-03-21 ENCOUNTER — APPOINTMENT (OUTPATIENT)
Dept: INTERNAL MEDICINE | Facility: CLINIC | Age: 88
End: 2023-03-21
Payer: MEDICARE

## 2023-03-21 VITALS
HEIGHT: 75 IN | WEIGHT: 233 LBS | BODY MASS INDEX: 28.97 KG/M2 | SYSTOLIC BLOOD PRESSURE: 132 MMHG | TEMPERATURE: 97.9 F | DIASTOLIC BLOOD PRESSURE: 89 MMHG | OXYGEN SATURATION: 97 % | HEART RATE: 87 BPM

## 2023-03-21 DIAGNOSIS — Z00.00 ENCOUNTER FOR GENERAL ADULT MEDICAL EXAMINATION W/OUT ABNORMAL FINDINGS: ICD-10-CM

## 2023-03-21 PROCEDURE — 93000 ELECTROCARDIOGRAM COMPLETE: CPT | Mod: 59

## 2023-03-21 PROCEDURE — 36415 COLL VENOUS BLD VENIPUNCTURE: CPT

## 2023-03-21 PROCEDURE — G0439: CPT

## 2023-03-21 PROCEDURE — G0444 DEPRESSION SCREEN ANNUAL: CPT

## 2023-03-23 ENCOUNTER — RESULT REVIEW (OUTPATIENT)
Age: 88
End: 2023-03-23

## 2023-03-23 PROBLEM — R31.0 GROSS HEMATURIA: Status: ACTIVE | Noted: 2023-03-14

## 2023-03-23 LAB
ALBUMIN SERPL ELPH-MCNC: 4.2 G/DL
ALP BLD-CCNC: 97 U/L
ALT SERPL-CCNC: 23 U/L
ANION GAP SERPL CALC-SCNC: 12 MMOL/L
APPEARANCE: ABNORMAL
APPEARANCE: CLEAR
AST SERPL-CCNC: 26 U/L
BACTERIA: NEGATIVE
BASOPHILS # BLD AUTO: 0.01 K/UL
BASOPHILS NFR BLD AUTO: 0.2 %
BILIRUB SERPL-MCNC: 0.6 MG/DL
BILIRUBIN URINE: NEGATIVE
BILIRUBIN URINE: NEGATIVE
BLOOD URINE: ABNORMAL
BLOOD URINE: ABNORMAL
BUN SERPL-MCNC: 35 MG/DL
CALCIUM SERPL-MCNC: 9.8 MG/DL
CHLORIDE SERPL-SCNC: 105 MMOL/L
CO2 SERPL-SCNC: 23 MMOL/L
COLOR: NORMAL
COLOR: YELLOW
CREAT SERPL-MCNC: 1.26 MG/DL
EGFR: 55 ML/MIN/1.73M2
EOSINOPHIL # BLD AUTO: 0.06 K/UL
EOSINOPHIL NFR BLD AUTO: 1.5 %
ESTIMATED AVERAGE GLUCOSE: 105 MG/DL
FERRITIN SERPL-MCNC: 269 NG/ML
FOLATE SERPL-MCNC: 10 NG/ML
GLUCOSE QUALITATIVE U: NEGATIVE
GLUCOSE QUALITATIVE U: NEGATIVE
GLUCOSE SERPL-MCNC: 84 MG/DL
HBA1C MFR BLD HPLC: 5.3 %
HCT VFR BLD CALC: 47.4 %
HGB BLD-MCNC: 14.8 G/DL
HYALINE CASTS: 0 /LPF
IMM GRANULOCYTES NFR BLD AUTO: 0.5 %
INR PPP: 1.1 RATIO
IRON SATN MFR SERPL: 38 %
IRON SERPL-MCNC: 108 UG/DL
KETONES URINE: NEGATIVE
KETONES URINE: NEGATIVE
LEUKOCYTE ESTERASE URINE: NEGATIVE
LEUKOCYTE ESTERASE URINE: NEGATIVE
LYMPHOCYTES # BLD AUTO: 1 K/UL
LYMPHOCYTES NFR BLD AUTO: 24.3 %
MAN DIFF?: NORMAL
MCHC RBC-ENTMCNC: 31.2 GM/DL
MCHC RBC-ENTMCNC: 31.6 PG
MCV RBC AUTO: 101.3 FL
MICROSCOPIC-UA: NORMAL
MONOCYTES # BLD AUTO: 0.39 K/UL
MONOCYTES NFR BLD AUTO: 9.5 %
NEUTROPHILS # BLD AUTO: 2.64 K/UL
NEUTROPHILS NFR BLD AUTO: 64 %
NITRITE URINE: NEGATIVE
NITRITE URINE: NEGATIVE
PH URINE: 6
PH URINE: 6
PLATELET # BLD AUTO: 189 K/UL
POTASSIUM SERPL-SCNC: 4.7 MMOL/L
PROT SERPL-MCNC: 6.9 G/DL
PROTEIN URINE: ABNORMAL
PROTEIN URINE: NORMAL
PT BLD: 13 SEC
RBC # BLD: 4.68 M/UL
RBC # FLD: 13.8 %
RED BLOOD CELLS URINE: 289 /HPF
SODIUM SERPL-SCNC: 140 MMOL/L
SPECIFIC GRAVITY URINE: 1.02
SPECIFIC GRAVITY URINE: 1.02
SQUAMOUS EPITHELIAL CELLS: 1 /HPF
TIBC SERPL-MCNC: 285 UG/DL
TSH SERPL-ACNC: 4.51 UIU/ML
UIBC SERPL-MCNC: 177 UG/DL
URINE CYTOLOGY: NORMAL
UROBILINOGEN URINE: NORMAL
UROBILINOGEN URINE: NORMAL
VIT B12 SERPL-MCNC: 348 PG/ML
WBC # FLD AUTO: 4.12 K/UL
WHITE BLOOD CELLS URINE: 9 /HPF

## 2023-03-23 RX ORDER — ASPIRIN ENTERIC COATED TABLETS 81 MG 81 MG/1
81 TABLET, DELAYED RELEASE ORAL
Refills: 0 | Status: ACTIVE | COMMUNITY
Start: 2023-03-23

## 2023-03-23 NOTE — PHYSICAL EXAM
[General Appearance - Well Developed] : well developed [General Appearance - Well Nourished] : well nourished [Normal Appearance] : normal appearance [Well Groomed] : well groomed [General Appearance - In No Acute Distress] : no acute distress [FreeTextEntry1] : sl le swelling/edema [Respiration, Rhythm And Depth] : normal respiratory rhythm and effort [Exaggerated Use Of Accessory Muscles For Inspiration] : no accessory muscle use [Abdomen Soft] : soft [Abdomen Tenderness] : non-tender [Costovertebral Angle Tenderness] : no ~M costovertebral angle tenderness [Urethral Meatus] : meatus normal [Penis Abnormality] : normal circumcised penis [Urinary Bladder Findings] : the bladder was normal on palpation [Scrotum] : the scrotum was normal [Epididymis] : the epididymides were normal [Testes Tenderness] : no tenderness of the testes [Testes Mass (___cm)] : there were no testicular masses [Anus Abnormality] : the anus and perineum were normal [Rectal Exam - Rectum] : digital rectal exam was normal [No Prostate Nodules] : no prostate nodules [Prostate Size ___ (0-4)] : prostate size [unfilled] (scale: 0-4) [Normal Station and Gait] : the gait and station were normal for the patient's age [] : no rash [No Focal Deficits] : no focal deficits [Oriented To Time, Place, And Person] : oriented to person, place, and time [Affect] : the affect was normal [Mood] : the mood was normal [Not Anxious] : not anxious [No Palpable Adenopathy] : no palpable adenopathy

## 2023-03-23 NOTE — HEALTH RISK ASSESSMENT
[Good] : ~his/her~ current health as good [Fair] :  ~his/her~ mood as fair [No] : No [With Significant Other] : lives with significant other [] :  [Fully functional (bathing, dressing, toileting, transferring, walking, feeding)] : Fully functional (bathing, dressing, toileting, transferring, walking, feeding) [Fully functional (using the telephone, shopping, preparing meals, housekeeping, doing laundry, using] : Fully functional and needs no help or supervision to perform IADLs (using the telephone, shopping, preparing meals, housekeeping, doing laundry, using transportation, managing medications and managing finances) [Reports changes in vision] : Reports changes in vision [Reports changes in dental health] : Reports changes in dental health [Smoke Detector] : smoke detector [Carbon Monoxide Detector] : carbon monoxide detector [Safety elements used in home] : safety elements used in home [Seat Belt] :  uses seat belt [Former] : Former [0] : 2) Feeling down, depressed, or hopeless: Not at all (0) [PHQ-2 Negative - No further assessment needed] : PHQ-2 Negative - No further assessment needed [> 15 Years] : > 15 Years [Audit-CScore] : 0 [Reports changes in hearing] : Reports no changes in hearing [de-identified] : about 30 years

## 2023-03-23 NOTE — ASSESSMENT
[FreeTextEntry1] : annual exam\par \par \par needs preop clearance\par ANGELIA WRIGHT is medically optimized for proposed surgical procedure and anesthesia.\par \par \par Blood work drawn in office today\par \par \par \par afib- on baby asprin \par declines DOAC\par \par good mets\par \par EKG- afib rate 64\par \par \par hematuria - likely has bladder cancer\par going for uro procedure\par \par

## 2023-03-23 NOTE — HISTORY OF PRESENT ILLNESS
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

## 2023-03-23 NOTE — PHYSICAL EXAM
[No Acute Distress] : no acute distress [Well Nourished] : well nourished [Well Developed] : well developed [Well-Appearing] : well-appearing [Normal Sclera/Conjunctiva] : normal sclera/conjunctiva [PERRL] : pupils equal round and reactive to light [EOMI] : extraocular movements intact [Normal Outer Ear/Nose] : the outer ears and nose were normal in appearance [Normal Oropharynx] : the oropharynx was normal [No JVD] : no jugular venous distention [No Lymphadenopathy] : no lymphadenopathy [Supple] : supple [Thyroid Normal, No Nodules] : the thyroid was normal and there were no nodules present [No Respiratory Distress] : no respiratory distress  [No Accessory Muscle Use] : no accessory muscle use [Clear to Auscultation] : lungs were clear to auscultation bilaterally [Normal S1, S2] : normal S1 and S2 [No Carotid Bruits] : no carotid bruits [No Abdominal Bruit] : a ~M bruit was not heard ~T in the abdomen [No Varicosities] : no varicosities [Pedal Pulses Present] : the pedal pulses are present [No Palpable Aorta] : no palpable aorta [No Extremity Clubbing/Cyanosis] : no extremity clubbing/cyanosis [Soft] : abdomen soft [Non Tender] : non-tender [Non-distended] : non-distended [No Masses] : no abdominal mass palpated [No HSM] : no HSM [Normal Bowel Sounds] : normal bowel sounds [Normal Posterior Cervical Nodes] : no posterior cervical lymphadenopathy [Normal Anterior Cervical Nodes] : no anterior cervical lymphadenopathy [No CVA Tenderness] : no CVA  tenderness [No Spinal Tenderness] : no spinal tenderness [No Joint Swelling] : no joint swelling [Grossly Normal Strength/Tone] : grossly normal strength/tone [No Rash] : no rash [Coordination Grossly Intact] : coordination grossly intact [No Focal Deficits] : no focal deficits [Normal Gait] : normal gait [Deep Tendon Reflexes (DTR)] : deep tendon reflexes were 2+ and symmetric [Normal Affect] : the affect was normal [Normal Insight/Judgement] : insight and judgment were intact [de-identified] : irregular rate and rhthym and + heart murmur [de-identified] : +3 edema

## 2023-03-23 NOTE — ASSESSMENT
[FreeTextEntry1] : 88 year old M following up for gross hematuria, bladder polyp, and L kidney mass\par \par ordered urine cytology, UA+micro,\par \par  MR Urogram ordered to better characterize renal mass\par \par renal mass not likely related to bladder lesion -will review MR when done\par \par reviewed lab work from ER-\par CBC, hb1ac, hematocrit normal, creatinine 1.13, coags normal, alkaline phosphatase 91\par \par negative urine culture- discontinue cefuroxime axetil\par \par reviewed CAT scan results with patientand [facility]- compared  2020 and 2023- both showed L kidney mass and bladder polyp, with increase in L kidney mass from 4 cm to 5 cm, increase in bladder polyp size from 1.7 cm to 6 cm\par \par cystoscopy performed today to rule out bladder polyp malignancy, showed 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

## 2023-03-23 NOTE — HISTORY OF PRESENT ILLNESS
[FreeTextEntry1] : annual exam\par  [de-identified] : annual exam\par \par patient with PMHx of Afib\par \par Went to ER a few weeks ago and for hematuria- mass on kidney and bladder seen\par followed up with uro\par wants to do a bladder "clean up first before addressing the kidney"\par \par Afib- takes a baby asprin \par declined DOAC due to expense\par \par does a lot of exercise and does not get short of breath\par \par

## 2023-03-23 NOTE — HISTORY OF PRESENT ILLNESS
[FreeTextEntry1] : 88 year old M referred after visit to ER last Friday, 2 days after pt noticed gross hematuria\par \par hx of renal mass and bladder tumpor-pt did not f/u in the past\par \par  labs and CT from ER visit reviewed w pt  and wife in detail- CBC, hb1ac, hematocrit normal, creatinine 1.13, coags normal, negative urine culture\par \par CT with left upper pole tumor and large bladder tumor\par \par denies burning with urination, fever, back pain\par \par currently taking aspirin 81 mg, \par \par 2020 CAT scan-L kidney mass and bladder polyp, \par \par most recent CAT scan- L kidney mass increased in size to 5 cm, bladder polyp increased in size from 1.7 cm to 6 cm\par \par IPSS today: 5\par \par has  not been attendoing to med [facility] -has issues with transportation and needs to have pcp in [facility]\par

## 2023-03-28 ENCOUNTER — APPOINTMENT (OUTPATIENT)
Dept: CARDIOLOGY | Facility: CLINIC | Age: 88
End: 2023-03-28
Payer: MEDICARE

## 2023-03-28 VITALS
HEIGHT: 75 IN | HEART RATE: 98 BPM | OXYGEN SATURATION: 97 % | SYSTOLIC BLOOD PRESSURE: 139 MMHG | WEIGHT: 240 LBS | BODY MASS INDEX: 29.84 KG/M2 | TEMPERATURE: 97.7 F | DIASTOLIC BLOOD PRESSURE: 81 MMHG

## 2023-03-28 PROCEDURE — 93000 ELECTROCARDIOGRAM COMPLETE: CPT

## 2023-03-28 PROCEDURE — 99205 OFFICE O/P NEW HI 60 MIN: CPT | Mod: 25

## 2023-03-28 NOTE — PHYSICAL EXAM
[Well Developed] : well developed [Well Nourished] : well nourished [No Acute Distress] : no acute distress [Normal Conjunctiva] : normal conjunctiva [Normal Venous Pressure] : normal venous pressure [No Carotid Bruit] : no carotid bruit [No Rub] : no rub [No Gallop] : no gallop [Clear Lung Fields] : clear lung fields [Good Air Entry] : good air entry [No Respiratory Distress] : no respiratory distress  [Soft] : abdomen soft [Non Tender] : non-tender [No Masses/organomegaly] : no masses/organomegaly [Normal Bowel Sounds] : normal bowel sounds [Normal Gait] : normal gait [No Edema] : no edema [No Cyanosis] : no cyanosis [No Clubbing] : no clubbing [No Varicosities] : no varicosities [No Rash] : no rash [No Skin Lesions] : no skin lesions [Moves all extremities] : moves all extremities [No Focal Deficits] : no focal deficits [Normal Speech] : normal speech [Alert and Oriented] : alert and oriented [Normal memory] : normal memory [Murmur] : murmur [de-identified] : Iregular pulse, Variable S1, widely split s2 3/6 ALIE to carotids

## 2023-03-28 NOTE — HISTORY OF PRESENT ILLNESS
[FreeTextEntry1] : ANGELIA WRIGHT 88 year M BMI 29 , presents with HTN HLD and reports  NYHA 2-3 dyspnea, no  orthopnea, chest pain, no palpitations, no syncope, no claudication mild peripheral edema.   Tobacco Negative/Reformed. \par Considering bladder surgery. Total visit time 45min. 132/89 HR 87. 3/21/23 AF 64 BPM RBBB RVH. Audible 3/6 ALIE radiating to carotids but  delayed and  diminished amplitude most c/w aortic valve stenosis. Currently only on ASA but warrants (CHADSVASC 2 -3 Age 75, HTN) DOAC. Denies syncope angina but has dyspnea and likely would be more symptomatic if not limited by mobility (remote fractured hip) issues. Echo,  Lexiscan stress. RTC 6w Cr 1.26. No DM.\par

## 2023-03-29 ENCOUNTER — OUTPATIENT (OUTPATIENT)
Dept: OUTPATIENT SERVICES | Facility: HOSPITAL | Age: 88
LOS: 1 days | End: 2023-03-29

## 2023-03-29 ENCOUNTER — APPOINTMENT (OUTPATIENT)
Dept: MRI IMAGING | Facility: CLINIC | Age: 88
End: 2023-03-29
Payer: MEDICARE

## 2023-03-29 DIAGNOSIS — Z96.641 PRESENCE OF RIGHT ARTIFICIAL HIP JOINT: Chronic | ICD-10-CM

## 2023-03-29 PROCEDURE — 74183 MRI ABD W/O CNTR FLWD CNTR: CPT | Mod: 26

## 2023-03-29 PROCEDURE — 72197 MRI PELVIS W/O & W/DYE: CPT | Mod: 26

## 2023-04-03 ENCOUNTER — MED ADMIN CHARGE (OUTPATIENT)
Age: 88
End: 2023-04-03

## 2023-04-03 ENCOUNTER — APPOINTMENT (OUTPATIENT)
Dept: CARDIOLOGY | Facility: CLINIC | Age: 88
End: 2023-04-03
Payer: MEDICARE

## 2023-04-03 PROCEDURE — A9500: CPT

## 2023-04-03 PROCEDURE — 93015 CV STRESS TEST SUPVJ I&R: CPT

## 2023-04-03 PROCEDURE — 78452 HT MUSCLE IMAGE SPECT MULT: CPT

## 2023-04-03 RX ORDER — REGADENOSON 0.08 MG/ML
0.4 INJECTION, SOLUTION INTRAVENOUS
Qty: 1 | Refills: 0 | Status: COMPLETED | OUTPATIENT
Start: 2023-04-03

## 2023-04-03 RX ADMIN — REGADENOSON 4 MG/5ML: 0.08 INJECTION, SOLUTION INTRAVENOUS at 00:00

## 2023-04-05 ENCOUNTER — OUTPATIENT (OUTPATIENT)
Dept: OUTPATIENT SERVICES | Facility: HOSPITAL | Age: 88
LOS: 1 days | Discharge: HOME | End: 2023-04-05

## 2023-04-05 VITALS
HEART RATE: 95 BPM | OXYGEN SATURATION: 97 % | WEIGHT: 237.44 LBS | HEIGHT: 75 IN | TEMPERATURE: 98 F | SYSTOLIC BLOOD PRESSURE: 141 MMHG | DIASTOLIC BLOOD PRESSURE: 72 MMHG | RESPIRATION RATE: 16 BRPM

## 2023-04-05 DIAGNOSIS — R31.0 GROSS HEMATURIA: ICD-10-CM

## 2023-04-05 DIAGNOSIS — Z96.641 PRESENCE OF RIGHT ARTIFICIAL HIP JOINT: Chronic | ICD-10-CM

## 2023-04-05 DIAGNOSIS — N32.9 BLADDER DISORDER, UNSPECIFIED: ICD-10-CM

## 2023-04-05 DIAGNOSIS — Z01.818 ENCOUNTER FOR OTHER PREPROCEDURAL EXAMINATION: ICD-10-CM

## 2023-04-05 LAB
ANION GAP SERPL CALC-SCNC: 4 MMOL/L — LOW (ref 5–17)
APPEARANCE UR: CLEAR — SIGNIFICANT CHANGE UP
BACTERIA # UR AUTO: NEGATIVE — SIGNIFICANT CHANGE UP
BILIRUB UR-MCNC: NEGATIVE — SIGNIFICANT CHANGE UP
BUN SERPL-MCNC: 32 MG/DL — HIGH (ref 7–23)
CALCIUM SERPL-MCNC: 9.4 MG/DL — SIGNIFICANT CHANGE UP (ref 8.5–10.1)
CHLORIDE SERPL-SCNC: 109 MMOL/L — HIGH (ref 96–108)
CO2 SERPL-SCNC: 26 MMOL/L — SIGNIFICANT CHANGE UP (ref 22–31)
COLOR SPEC: YELLOW — SIGNIFICANT CHANGE UP
CREAT SERPL-MCNC: 1.31 MG/DL — HIGH (ref 0.5–1.3)
DIFF PNL FLD: ABNORMAL
EGFR: 52 ML/MIN/1.73M2 — LOW
EPI CELLS # UR: NEGATIVE — SIGNIFICANT CHANGE UP
GLUCOSE SERPL-MCNC: 115 MG/DL — HIGH (ref 70–99)
GLUCOSE UR QL: NEGATIVE MG/DL — SIGNIFICANT CHANGE UP
HCT VFR BLD CALC: 43.3 % — SIGNIFICANT CHANGE UP (ref 39–50)
HGB BLD-MCNC: 14 G/DL — SIGNIFICANT CHANGE UP (ref 13–17)
KETONES UR-MCNC: NEGATIVE — SIGNIFICANT CHANGE UP
LEUKOCYTE ESTERASE UR-ACNC: NEGATIVE — SIGNIFICANT CHANGE UP
MCHC RBC-ENTMCNC: 31.6 PG — SIGNIFICANT CHANGE UP (ref 27–34)
MCHC RBC-ENTMCNC: 32.3 G/DL — SIGNIFICANT CHANGE UP (ref 32–36)
MCV RBC AUTO: 97.7 FL — SIGNIFICANT CHANGE UP (ref 80–100)
NITRITE UR-MCNC: NEGATIVE — SIGNIFICANT CHANGE UP
NRBC # BLD: 0 /100 WBCS — SIGNIFICANT CHANGE UP (ref 0–0)
PH UR: 5 — SIGNIFICANT CHANGE UP (ref 5–8)
PLATELET # BLD AUTO: 173 K/UL — SIGNIFICANT CHANGE UP (ref 150–400)
POTASSIUM SERPL-MCNC: 4.3 MMOL/L — SIGNIFICANT CHANGE UP (ref 3.5–5.3)
POTASSIUM SERPL-SCNC: 4.3 MMOL/L — SIGNIFICANT CHANGE UP (ref 3.5–5.3)
PROT UR-MCNC: 30 MG/DL
RBC # BLD: 4.43 M/UL — SIGNIFICANT CHANGE UP (ref 4.2–5.8)
RBC # FLD: 13.3 % — SIGNIFICANT CHANGE UP (ref 10.3–14.5)
RBC CASTS # UR COMP ASSIST: ABNORMAL /HPF (ref 0–4)
SODIUM SERPL-SCNC: 139 MMOL/L — SIGNIFICANT CHANGE UP (ref 135–145)
SP GR SPEC: 1.01 — SIGNIFICANT CHANGE UP (ref 1.01–1.02)
UROBILINOGEN FLD QL: NEGATIVE MG/DL — SIGNIFICANT CHANGE UP
WBC # BLD: 5.52 K/UL — SIGNIFICANT CHANGE UP (ref 3.8–10.5)
WBC # FLD AUTO: 5.52 K/UL — SIGNIFICANT CHANGE UP (ref 3.8–10.5)
WBC UR QL: SIGNIFICANT CHANGE UP

## 2023-04-05 NOTE — H&P PST ADULT - HISTORY OF PRESENT ILLNESS
85M otherwise healthy presents to the ED for right hip pain following a mechanical fall. Pt was sitting down in a chair when he missed the chair falling onto his right hip, he experienced immediate right hip pain and swelling. He denies hit to the head, LOC, any other orthopedic injuries. Pt denies SOB, CP, calf pain. 88M otherwise healthy male presents to PST for scheduled large transurethral resection of bladder tumor on 4/12/23 with      This patient denies any fever, cough, sob, flu like symptoms or travel outside of the [place] in the past 30 days

## 2023-04-05 NOTE — H&P PST ADULT - ATTENDING COMMENTS
as above  has very large bladder tumor right lateral wall  also has enlarging left renal mass-not c/w TCC per MRI    plan for TURBT-renal lesion to be reviewed and discussed at later date    rationzle risk alternative reviewed- all questions answered    of note-pt was admitted as part of med/cardiac clearance-had cardiac cath yesterday-cleared by cardiology with defined cardica risks-has moderate AS and coronary disease to be managed medically at present

## 2023-04-05 NOTE — H&P PST ADULT - ASSESSMENT
88M otherwise healthy male presents to PST for scheduled large transurethral resection of bladder tumor on 23 with Dr.Ziegelbaum CAPRINI SCORE [CLOT]    AGE RELATED RISK FACTORS                                                       MOBILITY RELATED FACTORS  [ ] Age 41-60 years                                            (1 Point)                  [ ] Bed rest                                                        (1 Point)  [ ] Age: 61-74 years                                           (2 Points)                 [ ] Plaster cast                                                   (2 Points)  [X ] Age= 75 years                                              (3 Points)                 [ ] Bed bound for more than 72 hours                 (2 Points)    DISEASE RELATED RISK FACTORS                                               GENDER SPECIFIC FACTORS  [X ] Edema in the lower extremities                       (1 Point)                  [ ] Pregnancy                                                     (1 Point)  [ ] Varicose veins                                               (1 Point)                  [ ] Post-partum < 6 weeks                                   (1 Point)             [ X] BMI > 25 Kg/m2                                            (1 Point)                  [ ] Hormonal therapy  or oral contraception          (1 Point)                 [ ] Sepsis (in the previous month)                        (1 Point)                  [ ] History of pregnancy complications                 (1 point)  [ ] Pneumonia or serious lung disease                                               [ ] Unexplained or recurrent                     (1 Point)           (in the previous month)                               (1 Point)  [ ] Abnormal pulmonary function test                     (1 Point)                 SURGERY RELATED RISK FACTORS  [ ] Acute myocardial infarction                              (1 Point)                 [ ]  Section                                             (1 Point)  [ ] Congestive heart failure (in the previous month)  (1 Point)               [ ] Minor surgery                                                  (1 Point)   [ ] Inflammatory bowel disease                             (1 Point)                 [ ] Arthroscopic surgery                                        (2 Points)  [ ] Central venous access                                      (2 Points)                [ X] General surgery lasting more than 45 minutes   (2 Points)       [ ] Stroke (in the previous month)                          (5 Points)               [ ] Elective arthroplasty                                         (5 Points)                                                                                                                                               HEMATOLOGY RELATED FACTORS                                                 TRAUMA RELATED RISK FACTORS  [ ] Prior episodes of VTE                                     (3 Points)                [ ] Fracture of the hip, pelvis, or leg                       (5 Points)  [ ] Positive family history for VTE                         (3 Points)                 [ ] Acute spinal cord injury (in the previous month)  (5 Points)  [ ] Prothrombin 35874 A                                     (3 Points)                 [ ] Paralysis  (less than 1 month)                             (5 Points)  [ ] Factor V Leiden                                             (3 Points)                  [ ] Multiple Trauma within 1 month                        (5 Points)  [ ] Lupus anticoagulants                                     (3 Points)                                                           [ ] Anticardiolipin antibodies                               (3 Points)                                                       [ ] High homocysteine in the blood                      (3 Points)                                             [ ] Other congenital or acquired thrombophilia      (3 Points)                                                [ ] Heparin induced thrombocytopenia                  (3 Points)                                          Total Score [   7       ]    Caprini Score 0 - 2:  Low Risk, No VTE Prophylaxis required for most patients, encourage ambulation  Caprini Score 3 - 6:  At Risk, pharmacologic VTE prophylaxis is indicated for most patients (in the absence of a contraindication)  Caprini Score Greater than or = 7:  High Risk, pharmacologic VTE prophylaxis is indicated for most patients (in the absence of a contraindication)

## 2023-04-05 NOTE — H&P PST ADULT - NSANTHOSAYNRD_GEN_A_CORE
No. АННА screening performed.  STOP BANG Legend: 0-2 = LOW Risk; 3-4 = INTERMEDIATE Risk; 5-8 = HIGH Risk

## 2023-04-05 NOTE — H&P PST ADULT - PROBLEM SELECTOR PLAN 1
Labs-CBC, BMP,, EKG   M/C required    Take routine meds DOS with small sips of water, avoid NSAIDs and OTC supplements  Pt aware COVID-19 PCR test needed 3-5 days prior to surgery   Anesthesiologist to review PST labs, EKG, required clearances, and optimization for surgery

## 2023-04-06 ENCOUNTER — INPATIENT (INPATIENT)
Facility: HOSPITAL | Age: 88
LOS: 7 days | Discharge: HOME HEALTH | DRG: 988 | End: 2023-04-14
Attending: THORACIC SURGERY (CARDIOTHORACIC VASCULAR SURGERY) | Admitting: THORACIC SURGERY (CARDIOTHORACIC VASCULAR SURGERY)
Payer: MEDICARE

## 2023-04-06 ENCOUNTER — APPOINTMENT (OUTPATIENT)
Dept: CARDIOLOGY | Facility: CLINIC | Age: 88
End: 2023-04-06
Payer: MEDICARE

## 2023-04-06 ENCOUNTER — APPOINTMENT (OUTPATIENT)
Dept: INTERNAL MEDICINE | Facility: CLINIC | Age: 88
End: 2023-04-06
Payer: MEDICARE

## 2023-04-06 VITALS
HEART RATE: 90 BPM | OXYGEN SATURATION: 97 % | HEIGHT: 75 IN | DIASTOLIC BLOOD PRESSURE: 41 MMHG | SYSTOLIC BLOOD PRESSURE: 122 MMHG | RESPIRATION RATE: 18 BRPM | TEMPERATURE: 98 F | WEIGHT: 237 LBS

## 2023-04-06 VITALS
DIASTOLIC BLOOD PRESSURE: 75 MMHG | TEMPERATURE: 97.3 F | BODY MASS INDEX: 29.47 KG/M2 | SYSTOLIC BLOOD PRESSURE: 121 MMHG | HEIGHT: 75 IN | WEIGHT: 237 LBS | OXYGEN SATURATION: 94 % | HEART RATE: 93 BPM

## 2023-04-06 DIAGNOSIS — Z96.641 PRESENCE OF RIGHT ARTIFICIAL HIP JOINT: Chronic | ICD-10-CM

## 2023-04-06 DIAGNOSIS — I48.11 LONGSTANDING PERSISTENT ATRIAL FI: ICD-10-CM

## 2023-04-06 LAB
ALBUMIN SERPL ELPH-MCNC: 3.7 G/DL — SIGNIFICANT CHANGE UP (ref 3.3–5)
ALP SERPL-CCNC: 89 U/L — SIGNIFICANT CHANGE UP (ref 40–120)
ALT FLD-CCNC: 27 U/L — SIGNIFICANT CHANGE UP (ref 10–45)
ANION GAP SERPL CALC-SCNC: 16 MMOL/L — SIGNIFICANT CHANGE UP (ref 5–17)
AST SERPL-CCNC: 37 U/L — SIGNIFICANT CHANGE UP (ref 10–40)
BASOPHILS # BLD AUTO: 0.02 K/UL — SIGNIFICANT CHANGE UP (ref 0–0.2)
BASOPHILS NFR BLD AUTO: 0.4 % — SIGNIFICANT CHANGE UP (ref 0–2)
BILIRUB SERPL-MCNC: 0.6 MG/DL — SIGNIFICANT CHANGE UP (ref 0.2–1.2)
BLD GP AB SCN SERPL QL: NEGATIVE — SIGNIFICANT CHANGE UP
BUN SERPL-MCNC: 31 MG/DL — HIGH (ref 7–23)
CALCIUM SERPL-MCNC: 9.4 MG/DL — SIGNIFICANT CHANGE UP (ref 8.4–10.5)
CHLORIDE SERPL-SCNC: 107 MMOL/L — SIGNIFICANT CHANGE UP (ref 96–108)
CO2 SERPL-SCNC: 16 MMOL/L — LOW (ref 22–31)
CREAT SERPL-MCNC: 1 MG/DL — SIGNIFICANT CHANGE UP (ref 0.5–1.3)
CULTURE RESULTS: SIGNIFICANT CHANGE UP
EGFR: 72 ML/MIN/1.73M2 — SIGNIFICANT CHANGE UP
EOSINOPHIL # BLD AUTO: 0.03 K/UL — SIGNIFICANT CHANGE UP (ref 0–0.5)
EOSINOPHIL NFR BLD AUTO: 0.6 % — SIGNIFICANT CHANGE UP (ref 0–6)
GLUCOSE SERPL-MCNC: 54 MG/DL — CRITICAL LOW (ref 70–99)
HCT VFR BLD CALC: 43.4 % — SIGNIFICANT CHANGE UP (ref 39–50)
HGB BLD-MCNC: 13.6 G/DL — SIGNIFICANT CHANGE UP (ref 13–17)
IMM GRANULOCYTES NFR BLD AUTO: 0.2 % — SIGNIFICANT CHANGE UP (ref 0–0.9)
LYMPHOCYTES # BLD AUTO: 0.97 K/UL — LOW (ref 1–3.3)
LYMPHOCYTES # BLD AUTO: 20.1 % — SIGNIFICANT CHANGE UP (ref 13–44)
MAGNESIUM SERPL-MCNC: 2.1 MG/DL — SIGNIFICANT CHANGE UP (ref 1.6–2.6)
MCHC RBC-ENTMCNC: 31.2 PG — SIGNIFICANT CHANGE UP (ref 27–34)
MCHC RBC-ENTMCNC: 31.3 GM/DL — LOW (ref 32–36)
MCV RBC AUTO: 99.5 FL — SIGNIFICANT CHANGE UP (ref 80–100)
MONOCYTES # BLD AUTO: 0.49 K/UL — SIGNIFICANT CHANGE UP (ref 0–0.9)
MONOCYTES NFR BLD AUTO: 10.2 % — SIGNIFICANT CHANGE UP (ref 2–14)
NEUTROPHILS # BLD AUTO: 3.3 K/UL — SIGNIFICANT CHANGE UP (ref 1.8–7.4)
NEUTROPHILS NFR BLD AUTO: 68.5 % — SIGNIFICANT CHANGE UP (ref 43–77)
NRBC # BLD: 0 /100 WBCS — SIGNIFICANT CHANGE UP (ref 0–0)
PHOSPHATE SERPL-MCNC: 2.9 MG/DL — SIGNIFICANT CHANGE UP (ref 2.5–4.5)
PLATELET # BLD AUTO: 153 K/UL — SIGNIFICANT CHANGE UP (ref 150–400)
POTASSIUM SERPL-MCNC: 5.2 MMOL/L — SIGNIFICANT CHANGE UP (ref 3.5–5.3)
POTASSIUM SERPL-SCNC: 5.2 MMOL/L — SIGNIFICANT CHANGE UP (ref 3.5–5.3)
PROT SERPL-MCNC: 6.8 G/DL — SIGNIFICANT CHANGE UP (ref 6–8.3)
RAPID RVP RESULT: SIGNIFICANT CHANGE UP
RBC # BLD: 4.36 M/UL — SIGNIFICANT CHANGE UP (ref 4.2–5.8)
RBC # FLD: 13.3 % — SIGNIFICANT CHANGE UP (ref 10.3–14.5)
RH IG SCN BLD-IMP: POSITIVE — SIGNIFICANT CHANGE UP
SARS-COV-2 RNA SPEC QL NAA+PROBE: SIGNIFICANT CHANGE UP
SODIUM SERPL-SCNC: 139 MMOL/L — SIGNIFICANT CHANGE UP (ref 135–145)
SPECIMEN SOURCE: SIGNIFICANT CHANGE UP
WBC # BLD: 4.82 K/UL — SIGNIFICANT CHANGE UP (ref 3.8–10.5)
WBC # FLD AUTO: 4.82 K/UL — SIGNIFICANT CHANGE UP (ref 3.8–10.5)

## 2023-04-06 PROCEDURE — 99214 OFFICE O/P EST MOD 30 MIN: CPT

## 2023-04-06 PROCEDURE — 99285 EMERGENCY DEPT VISIT HI MDM: CPT

## 2023-04-06 PROCEDURE — 99215 OFFICE O/P EST HI 40 MIN: CPT

## 2023-04-06 PROCEDURE — 93306 TTE W/DOPPLER COMPLETE: CPT

## 2023-04-06 NOTE — ED PROVIDER NOTE - CLINICAL SUMMARY MEDICAL DECISION MAKING FREE TEXT BOX
88M PMH AF, bladder cancer, s/p ORIF presenting from PCP's office for AS evaluation. Patient reports being seen for medical clearance for bladder surgery yesterday and was found to have AS, was told to go to ED for evaluation for surgery. Per HIE, pt's TTE showed EF 62% with severe AS. Patient denies SOB, syncope, dizziness, CP, palpitations, or worsening LE swelling.     Plan: CBC, CMP, Mg, phos, EKG. CTS consulted, will admit to their service under Dr. Tomas Lang

## 2023-04-06 NOTE — PHYSICAL EXAM
[Well Developed] : well developed [Well Nourished] : well nourished [No Acute Distress] : no acute distress [Normal Conjunctiva] : normal conjunctiva [Normal Venous Pressure] : normal venous pressure [No Carotid Bruit] : no carotid bruit [No Rub] : no rub [No Gallop] : no gallop [Murmur] : murmur [Clear Lung Fields] : clear lung fields [Good Air Entry] : good air entry [No Respiratory Distress] : no respiratory distress  [Soft] : abdomen soft [Non Tender] : non-tender [No Masses/organomegaly] : no masses/organomegaly [Normal Bowel Sounds] : normal bowel sounds [Normal Gait] : normal gait [No Edema] : no edema [No Cyanosis] : no cyanosis [No Clubbing] : no clubbing [No Varicosities] : no varicosities [No Rash] : no rash [No Skin Lesions] : no skin lesions [Moves all extremities] : moves all extremities [No Focal Deficits] : no focal deficits [Normal Speech] : normal speech [Alert and Oriented] : alert and oriented [Normal memory] : normal memory [de-identified] : see above [de-identified] : Iregular pulse, Variable S1, widely split s2 ,3/6 ALIE to carotids

## 2023-04-06 NOTE — HISTORY OF PRESENT ILLNESS
[Atrial Fibrillation] : atrial fibrillation [No Pertinent Pulmonary History] : no history of asthma, COPD, sleep apnea, or smoking [No Adverse Anesthesia Reaction] : no adverse anesthesia reaction in self or family member [Chronic Anticoagulation] : no chronic anticoagulation [Chronic Kidney Disease] : chronic kidney disease [Diabetes] : no diabetes [FreeTextEntry1] : bladder surgery [FreeTextEntry2] : 04/12/2023 [FreeTextEntry3] : [facility] [FreeTextEntry4] : preop for Bladder cancer surgery\par 5.5 centimeter bladder mass [FreeTextEntry7] : normal stress test\par ekg showed afib with rate 64\par

## 2023-04-06 NOTE — ED PROVIDER NOTE - PROGRESS NOTE DETAILS
Parviz JOYCE) - received signout from Dr. forrester, briefly a 87 yo M hx of bladder cancer, dx with  aortic stenosis as outpatient presenting for CT surgery eval and likely admission. Pt denies active symptoms. Seen by CT surgery who will admit to their service.

## 2023-04-06 NOTE — HISTORY OF PRESENT ILLNESS
[FreeTextEntry1] : ANGELIA WRIGHT 88 year M BMI 29 , presents with HTN HLD and reports  NYHA 2-3 dyspnea, no  orthopnea, chest pain, no palpitations, no syncope, no claudication mild peripheral edema.   Tobacco Negative/Reformed. \par Considering bladder surgery. Total visit time 45min. 132/89 HR 87. 3/21/23 AF 64 BPM RBBB RVH. Audible 3/6 ALIE radiating to carotids but  delayed and  diminished amplitude most c/w aortic valve stenosis. Currently only on ASA but warrants (CHADSVASC 2 -3 Age 75, HTN) DOAC. Denies syncope angina but has dyspnea and likely would be more symptomatic if not limited by mobility (remote fractured hip) issues. Echo,  Lexiscan stress. RTC 6w Cr 1.26. No DM. Echo 4/6/23 severe aortic valves stenosis as clinically suspected. Transfer to ER for for SAVR/TAVR evaluation. Defer all other elective surgery until cardiac valvular disease mitigated. /75 HR 93 BPM. Lexiscan nuc positive for ischemia, negative infarct LVEF 66%. Patient and family prefer [facility] discussed with Dr. Coronel's  PA, Joaquim.\par

## 2023-04-06 NOTE — ED PROVIDER NOTE - NS ED ROS FT
REVIEW OF SYSTEMS:  CONSTITUTIONAL: No fever, chills, night sweats, or fatigue  EYES: No eye pain, visual disturbances, or discharge  ENMT:  No difficulty hearing, tinnitus, vertigo; No sinus or throat pain  NECK: No pain or stiffness  RESPIRATORY: No cough, wheezing, or hemoptysis; No shortness of breath  CARDIOVASCULAR: No chest pain, palpitations, dizziness. +Leg swelling  GASTROINTESTINAL: No abdominal or epigastric pain. No nausea, vomiting, or hematemesis; No diarrhea or constipation. No melena or hematochezia.  GENITOURINARY: No dysuria, hematuria  NEUROLOGICAL: No headaches  SKIN: No itching, burning, rashes, or lesions   LYMPH NODES: No enlarged glands  MUSCULOSKELETAL: No joint pain or swelling; No muscle, back, or extremity pain  HEME/LYMPH: No easy bruising, or bleeding gums  ALLERGY AND IMMUNOLOGIC: No hives or eczema

## 2023-04-06 NOTE — ED PROVIDER NOTE - OBJECTIVE STATEMENT
88M PMH AF, bladder cancer, s/p ORIF presenting from PCP's office for AS evaluation. 88M PMH AF, bladder cancer, s/p ORIF presenting from PCP's office for AS evaluation. Patient reports being seen for medical clearance for bladder surgery yesterday and was found to have AS, was told to go to ED for evaluation for surgery. Per HIE, pt's TTE showed EF 62% with severe AS. Patient denies SOB, syncope, dizziness, CP, palpitations, or worsening LE swelling. Pt has chronic LE edema.

## 2023-04-06 NOTE — ASSESSMENT
[Patient Optimized for Surgery] : Patient optimized for surgery [Continue anti-platelet treatment as is] : Continue current anti-platelet treatment [FreeTextEntry4] : ANGELIA WRIGHT is medically optimized for proposed surgical procedure and anesthesia.\par  [FreeTextEntry6] : stop 10 days prior to surgery

## 2023-04-06 NOTE — ED PROVIDER NOTE - ATTENDING CONTRIBUTION TO CARE
Gavin Chou MD:   I personally saw the patient and performed a substantive portion of the visit including all aspects of the medical decision making.    MDM: 88 M w/ hx of AF, bladder cancer, who was sent in for aortic stenosis. He was seen for medical clearance for bladder surgery, and found to have AS, was sent to ED for surgery.   TTE showed severe AS. Patient asymptomatic at this time.     Exam: non-tachycardic, (+) systolic ejection murmur, NVI in all 4 extremities.     Will obtain labs to evaluate for hematologic disorder, metabolic derangements, hepatic and renal function. Will discuss w/ sending physician and CTS consultant.     Signed out pending full labs/imaging, CTS consultation and likely admission.

## 2023-04-06 NOTE — ED ADULT NURSE NOTE - OBJECTIVE STATEMENT
87 y/o M A&Ox4 w/ PMH of HTN, HLD, afib, renal mass, and  bladder lesion sent in by cardiologist for aortic stenosis concerns. Pt was going for pre-surgical testing for tumor in bladder when aortic stenosis discovered. Pt not currently in any acute distress. Spontaneous, non-labored and even breathing noted. Bilateral pitting edema noted in legs. Pt denies sob, chest pain, palpitations, dizziness, weakness. Pt placed on CM.

## 2023-04-07 DIAGNOSIS — I07.1 RHEUMATIC TRICUSPID INSUFFICIENCY: ICD-10-CM

## 2023-04-07 DIAGNOSIS — I48.20 CHRONIC ATRIAL FIBRILLATION, UNSPECIFIED: ICD-10-CM

## 2023-04-07 DIAGNOSIS — I35.0 NONRHEUMATIC AORTIC (VALVE) STENOSIS: ICD-10-CM

## 2023-04-07 DIAGNOSIS — R60.0 LOCALIZED EDEMA: ICD-10-CM

## 2023-04-07 LAB
A1C WITH ESTIMATED AVERAGE GLUCOSE RESULT: 5.4 % — SIGNIFICANT CHANGE UP (ref 4–5.6)
ALBUMIN SERPL ELPH-MCNC: 3.6 G/DL — SIGNIFICANT CHANGE UP (ref 3.3–5)
ALP SERPL-CCNC: 90 U/L — SIGNIFICANT CHANGE UP (ref 40–120)
ALT FLD-CCNC: 23 U/L — SIGNIFICANT CHANGE UP (ref 10–45)
ANION GAP SERPL CALC-SCNC: 11 MMOL/L — SIGNIFICANT CHANGE UP (ref 5–17)
APTT BLD: 27.9 SEC — SIGNIFICANT CHANGE UP (ref 27.5–35.5)
APTT BLD: 27.9 SEC — SIGNIFICANT CHANGE UP (ref 27.5–35.5)
AST SERPL-CCNC: 25 U/L — SIGNIFICANT CHANGE UP (ref 10–40)
BASOPHILS # BLD AUTO: 0.02 K/UL — SIGNIFICANT CHANGE UP (ref 0–0.2)
BASOPHILS NFR BLD AUTO: 0.4 % — SIGNIFICANT CHANGE UP (ref 0–2)
BILIRUB SERPL-MCNC: 0.6 MG/DL — SIGNIFICANT CHANGE UP (ref 0.2–1.2)
BLD GP AB SCN SERPL QL: NEGATIVE — SIGNIFICANT CHANGE UP
BUN SERPL-MCNC: 29 MG/DL — HIGH (ref 7–23)
CALCIUM SERPL-MCNC: 9.2 MG/DL — SIGNIFICANT CHANGE UP (ref 8.4–10.5)
CHLORIDE SERPL-SCNC: 107 MMOL/L — SIGNIFICANT CHANGE UP (ref 96–108)
CO2 SERPL-SCNC: 21 MMOL/L — LOW (ref 22–31)
CREAT SERPL-MCNC: 1.03 MG/DL — SIGNIFICANT CHANGE UP (ref 0.5–1.3)
EGFR: 70 ML/MIN/1.73M2 — SIGNIFICANT CHANGE UP
EOSINOPHIL # BLD AUTO: 0.07 K/UL — SIGNIFICANT CHANGE UP (ref 0–0.5)
EOSINOPHIL NFR BLD AUTO: 1.5 % — SIGNIFICANT CHANGE UP (ref 0–6)
ESTIMATED AVERAGE GLUCOSE: 108 MG/DL — SIGNIFICANT CHANGE UP (ref 68–114)
FIBRINOGEN PPP-MCNC: 355 MG/DL — SIGNIFICANT CHANGE UP (ref 200–445)
GLUCOSE SERPL-MCNC: 79 MG/DL — SIGNIFICANT CHANGE UP (ref 70–99)
HCT VFR BLD CALC: 41.7 % — SIGNIFICANT CHANGE UP (ref 39–50)
HGB BLD-MCNC: 13.3 G/DL — SIGNIFICANT CHANGE UP (ref 13–17)
IMM GRANULOCYTES NFR BLD AUTO: 0.2 % — SIGNIFICANT CHANGE UP (ref 0–0.9)
INR BLD: 1.19 RATIO — HIGH (ref 0.88–1.16)
INR BLD: 1.21 RATIO — HIGH (ref 0.88–1.16)
LYMPHOCYTES # BLD AUTO: 1.28 K/UL — SIGNIFICANT CHANGE UP (ref 1–3.3)
LYMPHOCYTES # BLD AUTO: 27.9 % — SIGNIFICANT CHANGE UP (ref 13–44)
MCHC RBC-ENTMCNC: 31.4 PG — SIGNIFICANT CHANGE UP (ref 27–34)
MCHC RBC-ENTMCNC: 31.9 GM/DL — LOW (ref 32–36)
MCV RBC AUTO: 98.6 FL — SIGNIFICANT CHANGE UP (ref 80–100)
MONOCYTES # BLD AUTO: 0.49 K/UL — SIGNIFICANT CHANGE UP (ref 0–0.9)
MONOCYTES NFR BLD AUTO: 10.7 % — SIGNIFICANT CHANGE UP (ref 2–14)
NEUTROPHILS # BLD AUTO: 2.71 K/UL — SIGNIFICANT CHANGE UP (ref 1.8–7.4)
NEUTROPHILS NFR BLD AUTO: 59.3 % — SIGNIFICANT CHANGE UP (ref 43–77)
NRBC # BLD: 0 /100 WBCS — SIGNIFICANT CHANGE UP (ref 0–0)
NT-PROBNP SERPL-SCNC: 1635 PG/ML — HIGH (ref 0–300)
PLATELET # BLD AUTO: 157 K/UL — SIGNIFICANT CHANGE UP (ref 150–400)
POTASSIUM SERPL-MCNC: 4.1 MMOL/L — SIGNIFICANT CHANGE UP (ref 3.5–5.3)
POTASSIUM SERPL-SCNC: 4.1 MMOL/L — SIGNIFICANT CHANGE UP (ref 3.5–5.3)
PROT SERPL-MCNC: 6.3 G/DL — SIGNIFICANT CHANGE UP (ref 6–8.3)
PROTHROM AB SERPL-ACNC: 13.7 SEC — HIGH (ref 10.5–13.4)
PROTHROM AB SERPL-ACNC: 14 SEC — HIGH (ref 10.5–13.4)
RBC # BLD: 4.23 M/UL — SIGNIFICANT CHANGE UP (ref 4.2–5.8)
RBC # FLD: 13.2 % — SIGNIFICANT CHANGE UP (ref 10.3–14.5)
RH IG SCN BLD-IMP: POSITIVE — SIGNIFICANT CHANGE UP
SODIUM SERPL-SCNC: 139 MMOL/L — SIGNIFICANT CHANGE UP (ref 135–145)
T4 FREE SERPL-MCNC: 1.3 NG/DL — SIGNIFICANT CHANGE UP (ref 0.9–1.8)
TSH SERPL-MCNC: 1.92 UIU/ML — SIGNIFICANT CHANGE UP (ref 0.27–4.2)
WBC # BLD: 4.58 K/UL — SIGNIFICANT CHANGE UP (ref 3.8–10.5)
WBC # FLD AUTO: 4.58 K/UL — SIGNIFICANT CHANGE UP (ref 3.8–10.5)

## 2023-04-07 PROCEDURE — 93880 EXTRACRANIAL BILAT STUDY: CPT | Mod: 26

## 2023-04-07 PROCEDURE — 99222 1ST HOSP IP/OBS MODERATE 55: CPT

## 2023-04-07 PROCEDURE — 93306 TTE W/DOPPLER COMPLETE: CPT | Mod: 26

## 2023-04-07 PROCEDURE — 99223 1ST HOSP IP/OBS HIGH 75: CPT

## 2023-04-07 PROCEDURE — 93971 EXTREMITY STUDY: CPT | Mod: 26,LT

## 2023-04-07 PROCEDURE — 71045 X-RAY EXAM CHEST 1 VIEW: CPT | Mod: 26

## 2023-04-07 PROCEDURE — 93010 ELECTROCARDIOGRAM REPORT: CPT

## 2023-04-07 PROCEDURE — 94010 BREATHING CAPACITY TEST: CPT | Mod: 26

## 2023-04-07 RX ORDER — ASPIRIN/CALCIUM CARB/MAGNESIUM 324 MG
81 TABLET ORAL DAILY
Refills: 0 | Status: DISCONTINUED | OUTPATIENT
Start: 2023-04-07 | End: 2023-04-12

## 2023-04-07 RX ORDER — CHOLECALCIFEROL (VITAMIN D3) 125 MCG
2000 CAPSULE ORAL DAILY
Refills: 0 | Status: DISCONTINUED | OUTPATIENT
Start: 2023-04-07 | End: 2023-04-12

## 2023-04-07 RX ORDER — ASCORBIC ACID 60 MG
500 TABLET,CHEWABLE ORAL DAILY
Refills: 0 | Status: DISCONTINUED | OUTPATIENT
Start: 2023-04-07 | End: 2023-04-12

## 2023-04-07 RX ORDER — SODIUM CHLORIDE 9 MG/ML
3 INJECTION INTRAMUSCULAR; INTRAVENOUS; SUBCUTANEOUS EVERY 8 HOURS
Refills: 0 | Status: DISCONTINUED | OUTPATIENT
Start: 2023-04-07 | End: 2023-04-12

## 2023-04-07 RX ORDER — ENOXAPARIN SODIUM 100 MG/ML
40 INJECTION SUBCUTANEOUS EVERY 24 HOURS
Refills: 0 | Status: DISCONTINUED | OUTPATIENT
Start: 2023-04-07 | End: 2023-04-08

## 2023-04-07 RX ADMIN — SODIUM CHLORIDE 3 MILLILITER(S): 9 INJECTION INTRAMUSCULAR; INTRAVENOUS; SUBCUTANEOUS at 05:06

## 2023-04-07 RX ADMIN — Medication 2000 UNIT(S): at 12:11

## 2023-04-07 RX ADMIN — SODIUM CHLORIDE 3 MILLILITER(S): 9 INJECTION INTRAMUSCULAR; INTRAVENOUS; SUBCUTANEOUS at 12:56

## 2023-04-07 RX ADMIN — ENOXAPARIN SODIUM 40 MILLIGRAM(S): 100 INJECTION SUBCUTANEOUS at 12:11

## 2023-04-07 RX ADMIN — Medication 81 MILLIGRAM(S): at 12:11

## 2023-04-07 RX ADMIN — Medication 500 MILLIGRAM(S): at 12:11

## 2023-04-07 RX ADMIN — SODIUM CHLORIDE 3 MILLILITER(S): 9 INJECTION INTRAMUSCULAR; INTRAVENOUS; SUBCUTANEOUS at 21:08

## 2023-04-07 NOTE — H&P ADULT - HISTORY OF PRESENT ILLNESS
88M PMH AF, bladder cancer, s/p ORIF presenting from PCP's office for AS evaluation. Patient reports being seen for medical clearance for bladder surgery (planned 4/12) yesterday and was found to have AS, was told to go to ED for evaluation for surgery/TAVR w/u by cardiologist Dr. Mccollum. Per HIE, pt's TTE showed EF 62% with severe AS and severe TR, moderate MR. Patient denies SOB, syncope, dizziness, CP, palpitations, or worsening LE swelling. Pt has chronic LE edema.    CT surgery consulted for surgical evaluation of severe AS, severe TR, moderate MR. All labs and imaging reviewed with on call CT surgeon, Dr. Lang. Plan for structural consult in AM, TAVR CT.

## 2023-04-07 NOTE — PATIENT PROFILE ADULT - FALL HARM RISK - HARM RISK INTERVENTIONS

## 2023-04-07 NOTE — H&P ADULT - NSHPLABSRESULTS_GEN_ALL_CORE
TTE 4/6 HIE Outpatient:   -RV volume overload w/ L ventricular systolic function mildly decreased w/ EF 62%  - moderate MR  - severe AS  - Mild AR  - Severe TR

## 2023-04-07 NOTE — H&P ADULT - PROBLEM SELECTOR PLAN 2
TTE 4/6: EF 62%, severe AS, severe TR, mod MR  c/w ASA 81  Preop w/u in progress-   MRSA, T&Sx2, preop labs  Structural consult in AM  TAVR CT in AM  Labs and imaging reviewed with Dr. Lang

## 2023-04-07 NOTE — H&P ADULT - NSHPPHYSICALEXAM_GEN_ALL_CORE
General: NAD, well appearing elderly male sitting in bed  HEENT:  NC/AT  Neuro: A&Ox4, gait steady w/ walker, speech clear, no focal deficits noted  Respiratory: B/L BS CTA, no wheeze, no rhonchi, no crackles noted  Cardiovascular: RRR, normal S1S2, +systolic murmur  GI: Abd soft, NT/ND, +BSx4Q +BM  Peripheral Vascular:  LLE +2 pitting edema w/ associated erythema, 2+ peripheral pulses, no clubbing, cyanosis, varicosities/PVD noted  Musculoskeletal: B/L UE and LE 5/5 strength, groins stable   Psychiatric: Normal mood, normal affect observed  Skin: Normal exam to inspection and palpation. no bleeding, no hematoma.

## 2023-04-07 NOTE — PHYSICAL THERAPY INITIAL EVALUATION ADULT - PERTINENT HX OF CURRENT PROBLEM, REHAB EVAL
88 y.o. M PMH AF, bladder cancer, s/p ORIF presenting from PCP's office for AS evaluation. Patient reports being seen for medical clearance for bladder surgery (planned 4/12) yesterday and was found to have AS, was told to go to ED for evaluation for surgery/TAVR w/u by cardiologist Dr. Mccollum. Per HIE, pt's TTE showed EF 62% with severe AS and severe TR, moderate MR. Patient denies SOB, syncope, dizziness, CP, palpitations, or worsening LE swelling. Pt has chronic LE edema.    CT surgery consulted for surgical evaluation of severe AS, severe TR, moderate MR. All labs and imaging reviewed with on call CT surgeon, Dr. Lang. Plan for structural consult in AM, TAVR CT.

## 2023-04-07 NOTE — H&P ADULT - ASSESSMENT
88M PMH AF, bladder cancer, s/p ORIF presenting from PCP's office for AS evaluation. Patient reports being seen for medical clearance for bladder surgery (planned 4/12) yesterday and was found to have AS, was told to go to ED for evaluation for surgery/TAVR w/u by cardiologist Dr. Mccollum. Per HIE, pt's TTE showed EF 62% with severe AS and severe TR, moderate MR. Patient denies SOB, syncope, dizziness, CP, palpitations, or worsening LE swelling. Pt has chronic LE edema.    CT surgery consulted for surgical evaluation of severe AS, severe TR, moderate MR. All labs and imaging reviewed with on call CT surgeon, Dr. Lang. Plan for structural consult in AM, TAVR CT.     4/7 Admit to CT surgery service, structural consult in AM, TAVR CT

## 2023-04-07 NOTE — PHYSICAL THERAPY INITIAL EVALUATION ADULT - ADDITIONAL COMMENTS
Pt resides in a pvt home w/ spouse, no steps to negotiate (has second floor, pt does not utilize). PTA pt was independent w/ all mobility & ADL's. Uses a RW for ambulation, pt also owns WC & commode.

## 2023-04-07 NOTE — H&P ADULT - NSHPSOCIALHISTORY_GEN_ALL_CORE
Marital Status:   Occupation: retired salesman  Lives with: wife    Substance Use : denies  Tobacco Usage:  (  x ) never smoked   (   ) former smoker   (   ) current smoker  (     ) pack year  (        ) last tobacco use date  Alcohol Usage: denies  Assist Device: walker

## 2023-04-07 NOTE — CONSULT NOTE ADULT - SUBJECTIVE AND OBJECTIVE BOX
History of Present Illness:   88M PMH SIGNIFICANT FOR    Aortic valve stenosis  AF  bladder cancer  Right femur fracture s/p ORIF   Left renal mass  Persistent atrial fibrillation ([id number] 3)     who presented from PCP's office for AS evaluation. Patient reports being seen for medical clearance for bladder surgery (planned 4/12) yesterday and was found to have AS, was told to go to ED for evaluation for surgery/TAVR w/u by cardiologist Dr. Mccollum. Per HIE, pt's TTE showed EF 62% with severe AS and severe TR, moderate MR.     The patient reports that over the last few weeks he has been experiencing increasing shortness of breath and dyspnea upon exertion with associated fatigue.  Denies any change in orthopnea, PND, anshul syncope, lightheaded sensation, dizziness, chest pain/tightness/discomfort or claudication.   Pt has chronic LE edema.  No acute worsening of lower extremity swelling.    TTE on 4/6 of 2023 demonstrated right ventricular volume overload.  The left ventricle systolic function is mildly decreased with EF 62%.  Significant wall motion abnormalities.  Interventricular septum is flattened in diastole consistent with right ventricular volume overload.  The entire inferior septum and mid and apical inferior wall are hypokinetic.  Severe grade 3 left ventricular diastolic dysfunction.  Moderate calcification of the mitral valve.  Moderate mitral valve regurgitation.  Severely dilated left atrium.  PASP 34 mmHg.  Severe low-flow low gradient aortic valve stenosis.    The patient is being assessed for bladder surgery.    Past medical/surgical history  Aortic valve stenosis  AF  bladder cancer  Right femur fracture s/p ORIF   Left renal mass  Persistent atrial fibrillation ([id number] 3)     Social history  Denies any alcohol use, tobacco use illicit drug use.  .  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    Family history  Noncontributory for premature coronary disease or sudden cardiac death.    Review of systems  14 point review of systems is otherwise unremarkable except as described above in history of present illness    Physical examination  No apparent distress, alert and oriented x3, appropriate affect  JVD is not elevated, supple, no lymphadenopathy  Irregular irregular with 2 out of 6 crescendo decrescendo murmur heard loudest at the right upper sternal border radiating to the carotids  Positive bowel sound, soft, nontender, nondistended, no mass and guarding rebound tender no clubbing cyanosis or edema  Moving all extremities spontaneously  2+ DP/PT pulse  No focal deficits    PAST MEDICAL & SURGICAL HISTORY:  Chronic atrial fibrillation  Bladder cancer  S/P hip replacement, right    T(C): 36.3 (04-07-23 @ 12:07), Max: 36.7 (04-07-23 @ 01:11)  HR: 100 (04-07-23 @ 12:07) (84 - 100)  BP: 150/98 (04-07-23 @ 12:07) (106/62 - 153/73)  RR: 18 (04-07-23 @ 12:07) (17 - 18)  SpO2: 98% (04-07-23 @ 12:07) (97% - 98%)  Wt(kg): --  04-06 @ 07:01  -  04-07 @ 07:00  --------------------------------------------------------  IN: 120 mL / OUT: 0 mL / NET: 120 mL    04-07 @ 07:01  -  04-07 @ 17:29  --------------------------------------------------------  IN: 320 mL / OUT: 250 mL / NET: 70 mL    MEDICATIONS  (STANDING):  ascorbic acid 500 milliGRAM(s) Oral daily  aspirin enteric coated 81 milliGRAM(s) Oral daily  cholecalciferol 2000 Unit(s) Oral daily  enoxaparin Injectable 40 milliGRAM(s) SubCutaneous every 24 hours  sodium chloride 0.9% lock flush 3 milliLiter(s) IV Push every 8 hours    Home Medications:  aspirin 81 mg oral tablet: orally once a day (05 Apr 2023 13:34)  Vitamin C 500 mg oral tablet: 1 tab(s) orally once a day (05 Apr 2023 13:34)  Vitamin D3: orally once a day (05 Apr 2023 13:34)                        13.3   4.58  )-----------( 157      ( 07 Apr 2023 05:42 )             41.7   04-07    139  |  107  |  29<H>  ----------------------------<  79  4.1   |  21<L>  |  1.03    Ca    9.2      07 Apr 2023 05:42  Phos  2.9     04-06  Mg     2.1     04-06    TPro  6.3  /  Alb  3.6  /  TBili  0.6  /  DBili  x   /  AST  25  /  ALT  23  /  AlkPhos  90  04-07  PT/INR - ( 07 Apr 2023 05:42 )   PT: 13.7 sec;   INR: 1.19 ratio    PTT - ( 07 Apr 2023 05:42 )  PTT:27.9 sec    Assessment/plan  Severe low-flow low gradient aortic valve stenosis  --The patients imaging studies were reviewed with structural echocardiography team/Dr. Gonzales.  Based upon images it appears that the patient has severe low-flow low gradient aortic valve stenosis.  For further assessment it is recommended that a heart CTA be performed from which an aortic valve calcium score be obtained.    --If the patient is found to have severe aortic valve stenosis he will be further assessed for TAVR.  The necessary work-up for the TAVR procedure was gone over including heart CTA, carotid ultrasound, spirometry and angiogram.  Indications and details of the studies were gone over with the patient.  Benefits and risks were explained  --Indications and details for the potential TAVR procedure were gone over with the patient.  Benefits and risks were gone over.  Risks include but are not limited to infection, bleeding, arrhythmia, TIA/stroke, hemodynamic instability, vascular injury, need for urgent surgery and death.  --Venous duplex study on 4/7/2023 demonstrate no evidence of lower extremity DVT  --Continue telemetry.  --EKG demonstrates atrial fibrillation, left axis deviation and right bundle branch block ( ms/QTc 439).  --Aim for potassium greater than 4 magnesium greater than 2.  --Out of bed to chair as tolerated.  The patient uses a walker at baseline.    The patient is agreeable to proceed with aortic valve stenosis work-up.    All questions and concerns of the patient were addressed.    Findings and plan were discussed with cardiac surgery team.    Findings and plan were discussed with outpatient cardiologist/Dr. Mccollum.   History of Present Illness:   88M PMH SIGNIFICANT FOR    Aortic valve stenosis  AF  bladder cancer  Right femur fracture s/p ORIF   Left renal mass  Persistent atrial fibrillation ([id number] 3)     who presented from PCP's office for AS evaluation. Patient reports being seen for medical clearance for bladder surgery (planned 4/12) yesterday and was found to have AS, was told to go to ED for evaluation for surgery/TAVR w/u by cardiologist Dr. Mccollum. Per HIE, pt's TTE showed EF 62% with severe AS and severe TR, moderate MR.     The patient reports that over the last few weeks he has been experiencing increasing shortness of breath and dyspnea upon exertion with associated fatigue.  Denies any change in orthopnea, PND, anshul syncope, lightheaded sensation, dizziness, chest pain/tightness/discomfort or claudication.   Pt has chronic LE edema.  No acute worsening of lower extremity swelling.    TTE on 4/6 of 2023 demonstrated right ventricular volume overload.  The left ventricle systolic function is mildly decreased with EF 62%.  Significant wall motion abnormalities.  Interventricular septum is flattened in diastole consistent with right ventricular volume overload.  The entire inferior septum and mid and apical inferior wall are hypokinetic.  Severe grade 3 left ventricular diastolic dysfunction.  Moderate calcification of the mitral valve.  Moderate mitral valve regurgitation.  Severely dilated left atrium.  PASP 34 mmHg.  Severe low-flow low gradient aortic valve stenosis.    The patient is being assessed for bladder surgery.    Past medical/surgical history  Aortic valve stenosis  AF  bladder cancer  Right femur fracture s/p ORIF   Left renal mass  Persistent atrial fibrillation ([id number] 3)     Social history  Denies any alcohol use, tobacco use illicit drug use.  .  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    Family history  Noncontributory for premature coronary disease or sudden cardiac death.    Review of systems  14 point review of systems is otherwise unremarkable except as described above in history of present illness    Physical examination  No apparent distress, alert and oriented x3, appropriate affect  JVD is not elevated, supple, no lymphadenopathy  Irregular irregular with 2 out of 6 crescendo decrescendo murmur heard loudest at the right upper sternal border radiating to the carotids  Positive bowel sound, soft, nontender, nondistended, no mass and guarding rebound tender no clubbing cyanosis or edema  Moving all extremities spontaneously  2+ DP/PT pulse  No focal deficits    PAST MEDICAL & SURGICAL HISTORY:  Chronic atrial fibrillation  Bladder cancer  S/P hip replacement, right    T(C): 36.3 (04-07-23 @ 12:07), Max: 36.7 (04-07-23 @ 01:11)  HR: 100 (04-07-23 @ 12:07) (84 - 100)  BP: 150/98 (04-07-23 @ 12:07) (106/62 - 153/73)  RR: 18 (04-07-23 @ 12:07) (17 - 18)  SpO2: 98% (04-07-23 @ 12:07) (97% - 98%)  Wt(kg): --  04-06 @ 07:01  -  04-07 @ 07:00  --------------------------------------------------------  IN: 120 mL / OUT: 0 mL / NET: 120 mL    04-07 @ 07:01  -  04-07 @ 17:29  --------------------------------------------------------  IN: 320 mL / OUT: 250 mL / NET: 70 mL    MEDICATIONS  (STANDING):  ascorbic acid 500 milliGRAM(s) Oral daily  aspirin enteric coated 81 milliGRAM(s) Oral daily  cholecalciferol 2000 Unit(s) Oral daily  enoxaparin Injectable 40 milliGRAM(s) SubCutaneous every 24 hours  sodium chloride 0.9% lock flush 3 milliLiter(s) IV Push every 8 hours    Home Medications:  aspirin 81 mg oral tablet: orally once a day (05 Apr 2023 13:34)  Vitamin C 500 mg oral tablet: 1 tab(s) orally once a day (05 Apr 2023 13:34)  Vitamin D3: orally once a day (05 Apr 2023 13:34)                        13.3   4.58  )-----------( 157      ( 07 Apr 2023 05:42 )             41.7   04-07    139  |  107  |  29<H>  ----------------------------<  79  4.1   |  21<L>  |  1.03    Ca    9.2      07 Apr 2023 05:42  Phos  2.9     04-06  Mg     2.1     04-06    TPro  6.3  /  Alb  3.6  /  TBili  0.6  /  DBili  x   /  AST  25  /  ALT  23  /  AlkPhos  90  04-07  PT/INR - ( 07 Apr 2023 05:42 )   PT: 13.7 sec;   INR: 1.19 ratio    PTT - ( 07 Apr 2023 05:42 )  PTT:27.9 sec    Assessment/plan  Aortic Stenosis  --Mr. Hammonds has been experiencing worsening dyspnea upon exertion and fatigue.  It is unclear what is the etiology of his symptoms.  He was sent to the ED by his primary cardiologist for further assessment.  His symptoms may be do to his underlying aortic valve stenosis and possible underlying obstructive coronary artery disease (TTE is significant for wall motion abnormalities).  As part of his workup it is recommended that a RHC/LH/angiogram be performed for further assessment.  --The patients imaging studies were reviewed with structural echocardiography team/Dr. Gonzales.  Based upon images the valve appears severely stenotic but based on the AV calculation it is moderate with low flow low gradient.   For further assessment it is recommended that a heart CTA be performed from which an aortic valve calcium score be obtained.    --If the patient is found to have severe aortic valve stenosis he will be further assessed for TAVR.  The necessary work-up for the TAVR procedure was gone over including heart CTA, carotid ultrasound, spirometry and angiogram.  Indications and details of the studies were gone over with the patient.  Benefits and risks were explained  --Indications and details for the potential TAVR procedure were gone over with the patient.  Benefits and risks were gone over.  Risks include but are not limited to infection, bleeding, arrhythmia, TIA/stroke, hemodynamic instability, vascular injury, need for urgent surgery and death.  --Venous duplex study on 4/7/2023 demonstrate no evidence of lower extremity DVT  --Continue telemetry.  --EKG demonstrates atrial fibrillation, left axis deviation and right bundle branch block ( ms/QTc 439).  --Aim for potassium greater than 4 magnesium greater than 2.  --Out of bed to chair as tolerated.  The patient uses a walker at baseline.    The patient is agreeable to proceed with aortic valve stenosis work-up.    All questions and concerns of the patient were addressed.    Findings and plan were discussed with cardiac surgery team.    Findings and plan were discussed with outpatient cardiologist/Dr. Mccollum.   History of Present Illness:   88M PMH SIGNIFICANT FOR    Aortic valve stenosis  AF  bladder cancer  Right femur fracture s/p ORIF   Left renal mass  Persistent atrial fibrillation ([id number] 3)     who presented from PCP's office for AS evaluation. Patient reports being seen for medical clearance for bladder surgery (planned 4/12) yesterday and was found to have AS, was told to go to ED for evaluation for surgery/TAVR w/u by cardiologist Dr. Mccollum. Per HIE, pt's TTE showed EF 62% with severe AS and severe TR, moderate MR.     The patient reports that over the last few weeks he has been experiencing increasing shortness of breath and dyspnea upon exertion with associated fatigue.  Denies any change in orthopnea, PND, anshul syncope, lightheaded sensation, dizziness, chest pain/tightness/discomfort or claudication.   Pt has chronic LE edema.  No acute worsening of lower extremity swelling.    TTE on 4/6 of 2023 demonstrated right ventricular volume overload.  The left ventricle systolic function is mildly decreased with EF 62%.  Significant wall motion abnormalities.  Interventricular septum is flattened in diastole consistent with right ventricular volume overload.  The entire inferior septum and mid and apical inferior wall are hypokinetic.  Severe grade 3 left ventricular diastolic dysfunction.  Moderate calcification of the mitral valve.  Moderate mitral valve regurgitation.  Severely dilated left atrium.  PASP 34 mmHg.  Severe low-flow low gradient aortic valve stenosis.    The patient is being assessed for bladder surgery.    Past medical/surgical history  Aortic valve stenosis  AF  bladder cancer  Right femur fracture s/p ORIF   Left renal mass  Persistent atrial fibrillation ([id number] 3)     Social history  Denies any alcohol use, tobacco use illicit drug use.  .  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    Family history  Noncontributory for premature coronary disease or sudden cardiac death.    Review of systems  14 point review of systems is otherwise unremarkable except as described above in history of present illness    Physical examination  No apparent distress, alert and oriented x3, appropriate affect  JVD is not elevated, supple, no lymphadenopathy  Irregular irregular with 2 out of 6 crescendo decrescendo murmur heard loudest at the right upper sternal border radiating to the carotids  Positive bowel sound, soft, nontender, nondistended, no mass and guarding rebound tender no clubbing cyanosis or edema  Moving all extremities spontaneously  2+ DP/PT pulse  No focal deficits    PAST MEDICAL & SURGICAL HISTORY:  Chronic atrial fibrillation  Bladder cancer  S/P hip replacement, right    T(C): 36.3 (04-07-23 @ 12:07), Max: 36.7 (04-07-23 @ 01:11)  HR: 100 (04-07-23 @ 12:07) (84 - 100)  BP: 150/98 (04-07-23 @ 12:07) (106/62 - 153/73)  RR: 18 (04-07-23 @ 12:07) (17 - 18)  SpO2: 98% (04-07-23 @ 12:07) (97% - 98%)  Wt(kg): --  04-06 @ 07:01  -  04-07 @ 07:00  --------------------------------------------------------  IN: 120 mL / OUT: 0 mL / NET: 120 mL    04-07 @ 07:01  -  04-07 @ 17:29  --------------------------------------------------------  IN: 320 mL / OUT: 250 mL / NET: 70 mL    MEDICATIONS  (STANDING):  ascorbic acid 500 milliGRAM(s) Oral daily  aspirin enteric coated 81 milliGRAM(s) Oral daily  cholecalciferol 2000 Unit(s) Oral daily  enoxaparin Injectable 40 milliGRAM(s) SubCutaneous every 24 hours  sodium chloride 0.9% lock flush 3 milliLiter(s) IV Push every 8 hours    Home Medications:  aspirin 81 mg oral tablet: orally once a day (05 Apr 2023 13:34)  Vitamin C 500 mg oral tablet: 1 tab(s) orally once a day (05 Apr 2023 13:34)  Vitamin D3: orally once a day (05 Apr 2023 13:34)                        13.3   4.58  )-----------( 157      ( 07 Apr 2023 05:42 )             41.7   04-07    139  |  107  |  29<H>  ----------------------------<  79  4.1   |  21<L>  |  1.03    Ca    9.2      07 Apr 2023 05:42  Phos  2.9     04-06  Mg     2.1     04-06    TPro  6.3  /  Alb  3.6  /  TBili  0.6  /  DBili  x   /  AST  25  /  ALT  23  /  AlkPhos  90  04-07  PT/INR - ( 07 Apr 2023 05:42 )   PT: 13.7 sec;   INR: 1.19 ratio    PTT - ( 07 Apr 2023 05:42 )  PTT:27.9 sec    Assessment/plan  Aortic Stenosis  --Mr. Hammonds has been experiencing worsening dyspnea upon exertion and fatigue.  It is unclear what is the etiology of his symptoms.  He was sent to the ED by his primary cardiologist for further assessment.  His symptoms may be do to his underlying aortic valve stenosis and possible underlying obstructive coronary artery disease (TTE is significant for wall motion abnormalities).  As part of his workup it is recommended that a RHC/LH/angiogram be performed for further assessment.  --The patients imaging studies were reviewed with structural echocardiography team/Dr. Gonzales.  Based upon images the valve appears severely stenotic but based on the AV calculation it is moderate with low flow low gradient.   For further assessment it is recommended that a heart CTA be performed from which an aortic valve calcium score be obtained.    --If the patient is found to have severe aortic valve stenosis he will be further assessed for TAVR.  The necessary work-up for the TAVR procedure was gone over including heart CTA, carotid ultrasound, spirometry and angiogram.  Indications and details of the studies were gone over with the patient.  Benefits and risks were explained  --Indications and details for the potential TAVR procedure were gone over with the patient.  Benefits and risks were gone over.  Risks include but are not limited to infection, bleeding, arrhythmia, TIA/stroke, hemodynamic instability, vascular injury, need for urgent surgery and death.  --Elevated CHADSVASC score 2.  It is recommended that he be started on anticoagulation therapy.  Was only taking aspirin previously.  --Would check BNP.  --Venous duplex study on 4/7/2023 demonstrate no evidence of lower extremity DVT.  --Continue telemetry.  --EKG demonstrates atrial fibrillation, left axis deviation and right bundle branch block ( ms/QTc 439).  --Aim for potassium greater than 4 magnesium greater than 2.  --Out of bed to chair as tolerated.  The patient uses a walker at baseline.    The patient is agreeable to proceed with aortic valve stenosis work-up.    All questions and concerns of the patient were addressed.    Findings and plan were discussed with cardiac surgery team.    Findings and plan were discussed with outpatient cardiologist/Dr. Mccollum.

## 2023-04-07 NOTE — H&P ADULT - NSHPADDITIONALINFOADULT_GEN_ALL_CORE
Saima Ryan, AGACNP-BC  Cardiovascular & Thoracic Surgery  [address] [place] [address]  Office: [phone]    Available on [employer] Teams  Spectra: [id number]  New Consults: [id number]

## 2023-04-08 LAB
ANION GAP SERPL CALC-SCNC: 12 MMOL/L — SIGNIFICANT CHANGE UP (ref 5–17)
APPEARANCE UR: CLEAR — SIGNIFICANT CHANGE UP
APTT BLD: 45.9 SEC — HIGH (ref 27.5–35.5)
BACTERIA # UR AUTO: NEGATIVE — SIGNIFICANT CHANGE UP
BILIRUB UR-MCNC: NEGATIVE — SIGNIFICANT CHANGE UP
BUN SERPL-MCNC: 22 MG/DL — SIGNIFICANT CHANGE UP (ref 7–23)
CALCIUM SERPL-MCNC: 9.5 MG/DL — SIGNIFICANT CHANGE UP (ref 8.4–10.5)
CHLORIDE SERPL-SCNC: 107 MMOL/L — SIGNIFICANT CHANGE UP (ref 96–108)
CO2 SERPL-SCNC: 22 MMOL/L — SIGNIFICANT CHANGE UP (ref 22–31)
COLOR SPEC: SIGNIFICANT CHANGE UP
CREAT SERPL-MCNC: 0.96 MG/DL — SIGNIFICANT CHANGE UP (ref 0.5–1.3)
DIFF PNL FLD: ABNORMAL
EGFR: 76 ML/MIN/1.73M2 — SIGNIFICANT CHANGE UP
EPI CELLS # UR: 1 /HPF — SIGNIFICANT CHANGE UP
GLUCOSE SERPL-MCNC: 78 MG/DL — SIGNIFICANT CHANGE UP (ref 70–99)
GLUCOSE UR QL: NEGATIVE — SIGNIFICANT CHANGE UP
HCT VFR BLD CALC: 43.3 % — SIGNIFICANT CHANGE UP (ref 39–50)
HGB BLD-MCNC: 14 G/DL — SIGNIFICANT CHANGE UP (ref 13–17)
HYALINE CASTS # UR AUTO: 0 /LPF — SIGNIFICANT CHANGE UP (ref 0–2)
KETONES UR-MCNC: NEGATIVE — SIGNIFICANT CHANGE UP
LEUKOCYTE ESTERASE UR-ACNC: NEGATIVE — SIGNIFICANT CHANGE UP
MCHC RBC-ENTMCNC: 31.7 PG — SIGNIFICANT CHANGE UP (ref 27–34)
MCHC RBC-ENTMCNC: 32.3 GM/DL — SIGNIFICANT CHANGE UP (ref 32–36)
MCV RBC AUTO: 98 FL — SIGNIFICANT CHANGE UP (ref 80–100)
MRSA PCR RESULT.: SIGNIFICANT CHANGE UP
NITRITE UR-MCNC: NEGATIVE — SIGNIFICANT CHANGE UP
NRBC # BLD: 0 /100 WBCS — SIGNIFICANT CHANGE UP (ref 0–0)
PH UR: 6.5 — SIGNIFICANT CHANGE UP (ref 5–8)
PLATELET # BLD AUTO: 156 K/UL — SIGNIFICANT CHANGE UP (ref 150–400)
POTASSIUM SERPL-MCNC: 4.3 MMOL/L — SIGNIFICANT CHANGE UP (ref 3.5–5.3)
POTASSIUM SERPL-SCNC: 4.3 MMOL/L — SIGNIFICANT CHANGE UP (ref 3.5–5.3)
PROT UR-MCNC: SIGNIFICANT CHANGE UP
RBC # BLD: 4.42 M/UL — SIGNIFICANT CHANGE UP (ref 4.2–5.8)
RBC # FLD: 13.1 % — SIGNIFICANT CHANGE UP (ref 10.3–14.5)
RBC CASTS # UR COMP ASSIST: 10 /HPF — HIGH (ref 0–4)
S AUREUS DNA NOSE QL NAA+PROBE: SIGNIFICANT CHANGE UP
SODIUM SERPL-SCNC: 141 MMOL/L — SIGNIFICANT CHANGE UP (ref 135–145)
SP GR SPEC: 1.01 — LOW (ref 1.01–1.02)
UROBILINOGEN FLD QL: NEGATIVE — SIGNIFICANT CHANGE UP
WBC # BLD: 4.52 K/UL — SIGNIFICANT CHANGE UP (ref 3.8–10.5)
WBC # FLD AUTO: 4.52 K/UL — SIGNIFICANT CHANGE UP (ref 3.8–10.5)
WBC UR QL: 3 /HPF — SIGNIFICANT CHANGE UP (ref 0–5)

## 2023-04-08 PROCEDURE — 99231 SBSQ HOSP IP/OBS SF/LOW 25: CPT

## 2023-04-08 RX ORDER — HEPARIN SODIUM 5000 [USP'U]/ML
1000 INJECTION INTRAVENOUS; SUBCUTANEOUS
Qty: 25000 | Refills: 0 | Status: DISCONTINUED | OUTPATIENT
Start: 2023-04-08 | End: 2023-04-11

## 2023-04-08 RX ADMIN — SODIUM CHLORIDE 3 MILLILITER(S): 9 INJECTION INTRAMUSCULAR; INTRAVENOUS; SUBCUTANEOUS at 20:50

## 2023-04-08 RX ADMIN — Medication 500 MILLIGRAM(S): at 11:23

## 2023-04-08 RX ADMIN — Medication 2000 UNIT(S): at 11:23

## 2023-04-08 RX ADMIN — Medication 81 MILLIGRAM(S): at 11:23

## 2023-04-08 RX ADMIN — SODIUM CHLORIDE 3 MILLILITER(S): 9 INJECTION INTRAMUSCULAR; INTRAVENOUS; SUBCUTANEOUS at 05:15

## 2023-04-08 RX ADMIN — ENOXAPARIN SODIUM 40 MILLIGRAM(S): 100 INJECTION SUBCUTANEOUS at 11:22

## 2023-04-08 RX ADMIN — HEPARIN SODIUM 10 UNIT(S)/HR: 5000 INJECTION INTRAVENOUS; SUBCUTANEOUS at 14:30

## 2023-04-08 RX ADMIN — HEPARIN SODIUM 10 UNIT(S)/HR: 5000 INJECTION INTRAVENOUS; SUBCUTANEOUS at 19:06

## 2023-04-08 RX ADMIN — SODIUM CHLORIDE 3 MILLILITER(S): 9 INJECTION INTRAMUSCULAR; INTRAVENOUS; SUBCUTANEOUS at 14:33

## 2023-04-08 NOTE — PROGRESS NOTE ADULT - SUBJECTIVE AND OBJECTIVE BOX
VITAL SIGNS    Telemetry:    Vital Signs Last 24 Hrs  T(C): 36.3 (23 @ 04:51), Max: 36.6 (23 @ 19:48)  T(F): 97.3 (23 @ 04:51), Max: 97.9 (23 @ 19:48)  HR: 60 (23 @ 06:47) (60 - 100)  BP: 121/76 (23 @ 06:47) (121/76 - 150/98)  RR: 18 (23 @ 06:47) (18 - 18)  SpO2: 99% (23 @ 06:47) (96% - 100%)             @ 07:01  -   @ 07:00  --------------------------------------------------------  IN: 320 mL / OUT: 250 mL / NET: 70 mL       Daily     Daily Weight in k.5 (2023 08:55)  Admit Wt: Drug Dosing Weight  Height (cm): 190.5 (2023 16:47)  Weight (kg): 107.5 (2023 16:47)  BMI (kg/m2): 29.6 (2023 16:47)  BSA (m2): 2.36 (2023 16:47)     Daily Weight in k.5 (23 @ 08:55)    LABS      141  |  107  |  22  ----------------------------<  78  4.3   |  22  |  0.96    Ca    9.5      2023 05:37  Phos  2.9     -  Mg     2.1     -    TPro  6.3  /  Alb  3.6  /  TBili  0.6  /  DBili  x   /  AST  25  /  ALT  23  /  AlkPhos  90  -07                                 14.0   4.52  )-----------( 156      ( 2023 05:37 )             43.3          PT/INR - ( 2023 05:42 )   PT: 13.7 sec;   INR: 1.19 ratio         PTT - ( 2023 05:42 )  PTT:27.9 sec          CAPILLARY BLOOD GLUCOSE              Drains:     MS       [  ]   [  ]            L Pleural [  ]            R Pleural  [  ]            JUAQUIN  [  ]           Rebollar  [  ]    Pacing Wires      [  ]   Settings:                                  Isolated  [  ]                    CXR:      MEDICATIONS  ascorbic acid 500 milliGRAM(s) Oral daily  aspirin enteric coated 81 milliGRAM(s) Oral daily  cholecalciferol 2000 Unit(s) Oral daily  enoxaparin Injectable 40 milliGRAM(s) SubCutaneous every 24 hours  sodium chloride 0.9% lock flush 3 milliLiter(s) IV Push every 8 hours      PHYSICAL EXAM      Neurology: alert and oriented x 3, nonfocal, no gross deficits  CV :S1S2  Sternal Wound :  CDI , Stable  Lungs: cta  Abdomen: soft, nontender, nondistended, positive bowel sounds, last bowel movement   :   voids    Extremities:   warm to touch               PAST MEDICAL & SURGICAL HISTORY:  Chronic atrial fibrillation      Bladder cancer      S/P hip replacement, right                     Discussed with Cardiothoracic Team at AM rounds.

## 2023-04-08 NOTE — PROGRESS NOTE ADULT - PROBLEM SELECTOR PLAN 1
Severe Tricuspid regurg  TTE 4/6: EF 62%, severe AS, severe TR, mod MR  c/w ASA 81  Preop w/u in progress-   Appreciate Structural consult   TAVR CT ordered

## 2023-04-08 NOTE — PROGRESS NOTE ADULT - ASSESSMENT
88M PMH AF, bladder cancer, s/p ORIF presenting from PCP's office for AS evaluation. Patient reports being seen for medical clearance for bladder surgery (planned 4/12) yesterday and was found to have AS, was told to go to ED for evaluation for surgery/TAVR w/u by cardiologist Dr. Mccollum. Per HIE, pt's TTE showed EF 62% with severe AS and severe TR, moderate MR. Patient denies SOB, syncope, dizziness, CP, palpitations, or worsening LE swelling. Pt has chronic LE edema.    CT surgery consulted for surgical evaluation of severe AS, severe TR, moderate MR. All labs and imaging reviewed with on call CT surgeon, Dr. Lang. Plan for structural consult in AM, TAVR CT.     4/7 Admit to CT surgery service, structural consult in AM, TAVR CT  4/8: Stable overnight, Remains afib ? heparin gtt?

## 2023-04-09 LAB
ANION GAP SERPL CALC-SCNC: 10 MMOL/L — SIGNIFICANT CHANGE UP (ref 5–17)
APTT BLD: 59.7 SEC — HIGH (ref 27.5–35.5)
APTT BLD: 69.3 SEC — HIGH (ref 27.5–35.5)
BUN SERPL-MCNC: 28 MG/DL — HIGH (ref 7–23)
CALCIUM SERPL-MCNC: 9.6 MG/DL — SIGNIFICANT CHANGE UP (ref 8.4–10.5)
CHLORIDE SERPL-SCNC: 106 MMOL/L — SIGNIFICANT CHANGE UP (ref 96–108)
CO2 SERPL-SCNC: 23 MMOL/L — SIGNIFICANT CHANGE UP (ref 22–31)
CREAT SERPL-MCNC: 1.2 MG/DL — SIGNIFICANT CHANGE UP (ref 0.5–1.3)
EGFR: 58 ML/MIN/1.73M2 — LOW
GLUCOSE SERPL-MCNC: 91 MG/DL — SIGNIFICANT CHANGE UP (ref 70–99)
HCT VFR BLD CALC: 42 % — SIGNIFICANT CHANGE UP (ref 39–50)
HGB BLD-MCNC: 13.7 G/DL — SIGNIFICANT CHANGE UP (ref 13–17)
MCHC RBC-ENTMCNC: 31.6 PG — SIGNIFICANT CHANGE UP (ref 27–34)
MCHC RBC-ENTMCNC: 32.6 GM/DL — SIGNIFICANT CHANGE UP (ref 32–36)
MCV RBC AUTO: 96.8 FL — SIGNIFICANT CHANGE UP (ref 80–100)
NRBC # BLD: 0 /100 WBCS — SIGNIFICANT CHANGE UP (ref 0–0)
PLATELET # BLD AUTO: 162 K/UL — SIGNIFICANT CHANGE UP (ref 150–400)
POTASSIUM SERPL-MCNC: 4.3 MMOL/L — SIGNIFICANT CHANGE UP (ref 3.5–5.3)
POTASSIUM SERPL-SCNC: 4.3 MMOL/L — SIGNIFICANT CHANGE UP (ref 3.5–5.3)
RBC # BLD: 4.34 M/UL — SIGNIFICANT CHANGE UP (ref 4.2–5.8)
RBC # FLD: 13.1 % — SIGNIFICANT CHANGE UP (ref 10.3–14.5)
SODIUM SERPL-SCNC: 139 MMOL/L — SIGNIFICANT CHANGE UP (ref 135–145)
WBC # BLD: 5.11 K/UL — SIGNIFICANT CHANGE UP (ref 3.8–10.5)
WBC # FLD AUTO: 5.11 K/UL — SIGNIFICANT CHANGE UP (ref 3.8–10.5)

## 2023-04-09 PROCEDURE — 99231 SBSQ HOSP IP/OBS SF/LOW 25: CPT

## 2023-04-09 RX ADMIN — HEPARIN SODIUM 11 UNIT(S)/HR: 5000 INJECTION INTRAVENOUS; SUBCUTANEOUS at 19:25

## 2023-04-09 RX ADMIN — SODIUM CHLORIDE 3 MILLILITER(S): 9 INJECTION INTRAMUSCULAR; INTRAVENOUS; SUBCUTANEOUS at 04:20

## 2023-04-09 RX ADMIN — Medication 81 MILLIGRAM(S): at 12:02

## 2023-04-09 RX ADMIN — Medication 500 MILLIGRAM(S): at 12:10

## 2023-04-09 RX ADMIN — Medication 2000 UNIT(S): at 12:10

## 2023-04-09 RX ADMIN — SODIUM CHLORIDE 3 MILLILITER(S): 9 INJECTION INTRAMUSCULAR; INTRAVENOUS; SUBCUTANEOUS at 21:07

## 2023-04-09 RX ADMIN — SODIUM CHLORIDE 3 MILLILITER(S): 9 INJECTION INTRAMUSCULAR; INTRAVENOUS; SUBCUTANEOUS at 13:54

## 2023-04-09 RX ADMIN — HEPARIN SODIUM 11 UNIT(S)/HR: 5000 INJECTION INTRAVENOUS; SUBCUTANEOUS at 10:00

## 2023-04-09 NOTE — PROGRESS NOTE ADULT - ASSESSMENT
88M PMH AF, bladder cancer, s/p ORIF presenting from PCP's office for AS evaluation. Patient reports being seen for medical clearance for bladder surgery (planned 4/12) yesterday and was found to have AS, was told to go to ED for evaluation for surgery/TAVR w/u by cardiologist Dr. Mccollum. Per HIE, pt's TTE showed EF 62% with severe AS and severe TR, moderate MR. Patient denies SOB, syncope, dizziness, CP, palpitations, or worsening LE swelling. Pt has chronic LE edema.    CT surgery consulted for surgical evaluation of severe AS, severe TR, moderate MR. All labs and imaging reviewed with on call CT surgeon, Dr. Lang. Plan for structural consult in AM, TAVR CT.     4/7 Admit to CT surgery service, structural consult in AM, TAVR CT  4/8: Stable overnight, Remains afib ? heparin gtt?  4/9 VSS.  Currently therapeutic on heparin gtt.  Pending TAVR CT tomorrow and eventual cardiac cath as part of TAVR w/u.  No CP or SOB

## 2023-04-09 NOTE — PROGRESS NOTE ADULT - SUBJECTIVE AND OBJECTIVE BOX
Subjective: "Im ok"  Patient denies chest pain/SOB.  No abdominal pain/nausea/vomiting    TELEMETRY: Afib 80s    VITAL SIGNS    Vital Signs Last 24 Hrs  T(C): 36.4 (04-09-23 @ 04:25), Max: 36.7 (04-08-23 @ 12:22)  T(F): 97.5 (04-09-23 @ 04:25), Max: 98 (04-08-23 @ 12:22)  HR: 69 (04-09-23 @ 04:25) (68 - 94)  BP: 146/89 (04-09-23 @ 04:25) (129/80 - 146/89)  RR: 18 (04-09-23 @ 04:25) (18 - 18)  SpO2: 92% (04-09-23 @ 04:25) (92% - 99%)            04-08 @ 07:01  -  04-09 @ 07:00  --------------------------------------------------------  IN: 548 mL / OUT: 200 mL / NET: 348 mL       Daily     Daily   Admit Wt: Drug Dosing Weight  Height (cm): 190.5 (06 Apr 2023 16:47)  Weight (kg): 107.5 (06 Apr 2023 16:47)  BMI (kg/m2): 29.6 (06 Apr 2023 16:47)  BSA (m2): 2.36 (06 Apr 2023 16:47)      CAPILLARY BLOOD GLUCOSE       PHYSICAL EXAM    Neurology: alert and oriented x 3, nonfocal, no gross deficits  CV : Irreg/irreg   Lungs: clear. RR easy, unlabored   Abdomen: soft, nontender, nondistended, positive bowel sounds, +bowel movement   Neg N/V/D   :  pt voiding without difficulty   Extremities: Able to BANKS; + Left LE 1-2+ edema with venous stasis staining, neg calf tenderness.   PPP bilaterally        ascorbic acid 500 milliGRAM(s) Oral daily  aspirin enteric coated 81 milliGRAM(s) Oral daily  cholecalciferol 2000 Unit(s) Oral daily  heparin  Infusion 1000 Unit(s)/Hr IV Continuous <Continuous>  sodium chloride 0.9% lock flush 3 milliLiter(s) IV Push every 8 hours

## 2023-04-10 ENCOUNTER — APPOINTMENT (OUTPATIENT)
Dept: CARDIOLOGY | Facility: CLINIC | Age: 88
End: 2023-04-10

## 2023-04-10 LAB
ALBUMIN SERPL ELPH-MCNC: 3.4 G/DL — SIGNIFICANT CHANGE UP (ref 3.3–5)
ALP SERPL-CCNC: 85 U/L — SIGNIFICANT CHANGE UP (ref 40–120)
ALT FLD-CCNC: 30 U/L — SIGNIFICANT CHANGE UP (ref 10–45)
ANION GAP SERPL CALC-SCNC: 9 MMOL/L — SIGNIFICANT CHANGE UP (ref 5–17)
APTT BLD: 71 SEC — HIGH (ref 27.5–35.5)
AST SERPL-CCNC: 33 U/L — SIGNIFICANT CHANGE UP (ref 10–40)
BASOPHILS # BLD AUTO: 0.02 K/UL — SIGNIFICANT CHANGE UP (ref 0–0.2)
BASOPHILS NFR BLD AUTO: 0.5 % — SIGNIFICANT CHANGE UP (ref 0–2)
BILIRUB SERPL-MCNC: 0.5 MG/DL — SIGNIFICANT CHANGE UP (ref 0.2–1.2)
BUN SERPL-MCNC: 28 MG/DL — HIGH (ref 7–23)
CALCIUM SERPL-MCNC: 9.4 MG/DL — SIGNIFICANT CHANGE UP (ref 8.4–10.5)
CHLORIDE SERPL-SCNC: 107 MMOL/L — SIGNIFICANT CHANGE UP (ref 96–108)
CO2 SERPL-SCNC: 23 MMOL/L — SIGNIFICANT CHANGE UP (ref 22–31)
CREAT SERPL-MCNC: 1.23 MG/DL — SIGNIFICANT CHANGE UP (ref 0.5–1.3)
EGFR: 56 ML/MIN/1.73M2 — LOW
EOSINOPHIL # BLD AUTO: 0.09 K/UL — SIGNIFICANT CHANGE UP (ref 0–0.5)
EOSINOPHIL NFR BLD AUTO: 2.1 % — SIGNIFICANT CHANGE UP (ref 0–6)
GLUCOSE SERPL-MCNC: 77 MG/DL — SIGNIFICANT CHANGE UP (ref 70–99)
HCT VFR BLD CALC: 40.8 % — SIGNIFICANT CHANGE UP (ref 39–50)
HGB BLD-MCNC: 12.9 G/DL — LOW (ref 13–17)
IMM GRANULOCYTES NFR BLD AUTO: 0.2 % — SIGNIFICANT CHANGE UP (ref 0–0.9)
INR BLD: 1.19 RATIO — HIGH (ref 0.88–1.16)
LYMPHOCYTES # BLD AUTO: 1.55 K/UL — SIGNIFICANT CHANGE UP (ref 1–3.3)
LYMPHOCYTES # BLD AUTO: 36.9 % — SIGNIFICANT CHANGE UP (ref 13–44)
MAGNESIUM SERPL-MCNC: 2.1 MG/DL — SIGNIFICANT CHANGE UP (ref 1.6–2.6)
MCHC RBC-ENTMCNC: 30.9 PG — SIGNIFICANT CHANGE UP (ref 27–34)
MCHC RBC-ENTMCNC: 31.6 GM/DL — LOW (ref 32–36)
MCV RBC AUTO: 97.6 FL — SIGNIFICANT CHANGE UP (ref 80–100)
MONOCYTES # BLD AUTO: 0.47 K/UL — SIGNIFICANT CHANGE UP (ref 0–0.9)
MONOCYTES NFR BLD AUTO: 11.2 % — SIGNIFICANT CHANGE UP (ref 2–14)
NEUTROPHILS # BLD AUTO: 2.06 K/UL — SIGNIFICANT CHANGE UP (ref 1.8–7.4)
NEUTROPHILS NFR BLD AUTO: 49.1 % — SIGNIFICANT CHANGE UP (ref 43–77)
NRBC # BLD: 0 /100 WBCS — SIGNIFICANT CHANGE UP (ref 0–0)
PLATELET # BLD AUTO: 155 K/UL — SIGNIFICANT CHANGE UP (ref 150–400)
POTASSIUM SERPL-MCNC: 4.1 MMOL/L — SIGNIFICANT CHANGE UP (ref 3.5–5.3)
POTASSIUM SERPL-SCNC: 4.1 MMOL/L — SIGNIFICANT CHANGE UP (ref 3.5–5.3)
PROT SERPL-MCNC: 6.1 G/DL — SIGNIFICANT CHANGE UP (ref 6–8.3)
PROTHROM AB SERPL-ACNC: 13.8 SEC — HIGH (ref 10.5–13.4)
RBC # BLD: 4.18 M/UL — LOW (ref 4.2–5.8)
RBC # FLD: 13.2 % — SIGNIFICANT CHANGE UP (ref 10.3–14.5)
SARS-COV-2 RNA SPEC QL NAA+PROBE: SIGNIFICANT CHANGE UP
SODIUM SERPL-SCNC: 139 MMOL/L — SIGNIFICANT CHANGE UP (ref 135–145)
WBC # BLD: 4.2 K/UL — SIGNIFICANT CHANGE UP (ref 3.8–10.5)
WBC # FLD AUTO: 4.2 K/UL — SIGNIFICANT CHANGE UP (ref 3.8–10.5)

## 2023-04-10 PROCEDURE — 74174 CTA ABD&PLVS W/CONTRAST: CPT | Mod: 26

## 2023-04-10 PROCEDURE — 99233 SBSQ HOSP IP/OBS HIGH 50: CPT

## 2023-04-10 PROCEDURE — 71275 CT ANGIOGRAPHY CHEST: CPT | Mod: 26

## 2023-04-10 PROCEDURE — 94010 BREATHING CAPACITY TEST: CPT | Mod: 26

## 2023-04-10 PROCEDURE — 93010 ELECTROCARDIOGRAM REPORT: CPT

## 2023-04-10 PROCEDURE — 99232 SBSQ HOSP IP/OBS MODERATE 35: CPT

## 2023-04-10 RX ADMIN — SODIUM CHLORIDE 3 MILLILITER(S): 9 INJECTION INTRAMUSCULAR; INTRAVENOUS; SUBCUTANEOUS at 22:18

## 2023-04-10 RX ADMIN — Medication 81 MILLIGRAM(S): at 10:43

## 2023-04-10 RX ADMIN — Medication 500 MILLIGRAM(S): at 10:46

## 2023-04-10 RX ADMIN — HEPARIN SODIUM 11 UNIT(S)/HR: 5000 INJECTION INTRAVENOUS; SUBCUTANEOUS at 10:52

## 2023-04-10 RX ADMIN — SODIUM CHLORIDE 3 MILLILITER(S): 9 INJECTION INTRAMUSCULAR; INTRAVENOUS; SUBCUTANEOUS at 05:36

## 2023-04-10 RX ADMIN — Medication 2000 UNIT(S): at 10:43

## 2023-04-10 RX ADMIN — SODIUM CHLORIDE 3 MILLILITER(S): 9 INJECTION INTRAMUSCULAR; INTRAVENOUS; SUBCUTANEOUS at 13:51

## 2023-04-10 NOTE — PROGRESS NOTE ADULT - PROBLEM SELECTOR PLAN 1
Severe Tricuspid regurg  TTE 4/6: EF 62%, severe AS, severe TR, mod MR  c/w ASA 81  Preop w/u in progress-   Appreciate Structural consult   TAVR CT ordered plan for Monday  Will need eventual cardiac cath Severe Tricuspid regurg  TTE 4/6: EF 62%, severe AS, severe TR, mod MR  c/w ASA 81  Preop w/u in progress- struct heart following  TAVR CT done this am 4/10    if cr stable; cath in am tue with Dr. Pearson  OR date TBD

## 2023-04-10 NOTE — PROGRESS NOTE ADULT - SUBJECTIVE AND OBJECTIVE BOX
Subjective  No acute events reported overnight.  The patient denies any chest pain/tightness discomfort, fevers, chills, sweats, light sensation, dizziness or palpitations.  Denies any blood in his stool or urine.    Review of systems  14 point review of systems otherwise unremarkable except what described above in history of present illness    Telemetry  Atrial fibrillation with rates in the 40s to 90s with no VT/VF or SVT    Physical examination  No apparent distress, alert and oriented x3, appropriate affect  JVD is not elevated, supple, no lymphadenopathy  Irregular irregular with 2 out of 6 crescendo decrescendo murmur heard loudest at the right upper sternal border radiating to the carotids  Positive bowel sound, soft, nontender, nondistended, no mass and guarding rebound tender no clubbing cyanosis or edema  Moving all extremities spontaneously  2+ DP/PT pulse  No focal deficits  T(C): 36.3 (04-10-23 @ 05:25), Max: 36.6 (04-09-23 @ 12:48)  HR: 75 (04-10-23 @ 05:25) (73 - 84)  BP: 118/83 (04-10-23 @ 05:25) (100/67 - 118/83)  RR: 18 (04-10-23 @ 05:25) (18 - 18)  SpO2: 97% (04-10-23 @ 05:25) (91% - 97%)  Wt(kg): --  04-09 @ 07:01  -  04-10 @ 07:00  --------------------------------------------------------  IN: 932 mL / OUT: 1250 mL / NET: -318 mL    04-10 @ 07:01  -  04-10 @ 11:09  --------------------------------------------------------  IN: 240 mL / OUT: 0 mL / NET: 240 mL    MEDICATIONS  (STANDING):  ascorbic acid 500 milliGRAM(s) Oral daily  aspirin enteric coated 81 milliGRAM(s) Oral daily  cholecalciferol 2000 Unit(s) Oral daily  heparin  Infusion 1000 Unit(s)/Hr (11 mL/Hr) IV Continuous <Continuous>  sodium chloride 0.9% lock flush 3 milliLiter(s) IV Push every 8 hours    Home Medications:  aspirin 81 mg oral tablet: orally once a day (05 Apr 2023 13:34)  Vitamin C 500 mg oral tablet: 1 tab(s) orally once a day (05 Apr 2023 13:34)  Vitamin D3: orally once a day (05 Apr 2023 13:34)                        12.9   4.20  )-----------( 155      ( 10 Apr 2023 06:25 )             40.8   04-10    139  |  107  |  28<H>  ----------------------------<  77  4.1   |  23  |  1.23    Ca    9.4      10 Apr 2023 06:25  Mg     2.1     04-10    TPro  6.1  /  Alb  3.4  /  TBili  0.5  /  DBili  x   /  AST  33  /  ALT  30  /  AlkPhos  85  04-10  PT/INR - ( 10 Apr 2023 06:25 )   PT: 13.8 sec;   INR: 1.19 ratio    PTT - ( 10 Apr 2023 06:25 )  PTT:71.0 sec    Assessment/plan  Aortic Stenosis  --Mr. Hammonds has been experiencing worsening dyspnea upon exertion and fatigue.  It is unclear what is the etiology of his symptoms.  He was sent to the ED by his primary cardiologist for further assessment.  His symptoms may be do to his underlying aortic valve stenosis and possible underlying obstructive coronary artery disease (TTE is significant for wall motion abnormalities).  As part of his workup it is recommended that a RHC/LH/angiogram be performed for further assessment.  --The patients imaging studies were reviewed with structural echocardiography team/Dr. Gonzales.  Based upon images the valve appears severely stenotic but based on the AV calculation it is moderate with low flow low gradient.   For further assessment it is recommended that a heart CTA be performed from which an aortic valve calcium score be obtained.    --If the patient is found to have severe aortic valve stenosis he will be further assessed for TAVR.  The necessary work-up for the TAVR procedure was gone over including heart CTA, carotid ultrasound, spirometry and angiogram.  Indications and details of the studies were gone over with the patient.  Benefits and risks were explained.  --Carotid ultrasound on 4/7/2023 demonstrate no significant hemodynamic stenosis of either carotid artery.  --Heart CTA performed earlier today.  Results are pending.  --Discussed with patient indications for cardiac catheterization and findings of wall motion abnormality on echocardiogram studies.  Indications and details of the study was gone over.  Benefits and risk were reviewed.  Risks include but not limited to infection, bleeding, arrhythmia, TIA/stroke, hemodynamic instability, worsening renal insufficiency/need for dialysis and death.    --Indications and details for the potential TAVR procedure were gone over with the patient.  Benefits and risks were gone over.  Risks include but are not limited to infection, bleeding, arrhythmia, TIA/stroke, hemodynamic instability, vascular injury, need for urgent surgery and death.  --Elevated CHADSVASC score 2.  The patient was started on heparin drip.  Closely monitor for signs and symptoms of bleeding.  He was told to avoid all NSAIDs.  Signs and symptoms of bleeding were reviewed with the patient.  Plan for patient to be discharged on a DOAC.  --Venous duplex study on 4/7/2023 demonstrate no evidence of lower extremity DVT.  --Continue telemetry.  --EKG demonstrates atrial fibrillation, left axis deviation and right bundle branch block ( ms/QTc 439).  --Aim for potassium greater than 4 magnesium greater than 2.  --Out of bed to chair as tolerated.  The patient uses a walker at baseline.    The patient is agreeable to proceed with aortic valve stenosis work-up.    All questions and concerns of the patient were addressed.    Findings and plan were discussed with cardiac surgery team.      Time-based billing (NON-critical care).     35 minutes spent on total encounter. The necessity of the time spent during the encounter on this date of service was due to:     education, assessment and coordination of care.

## 2023-04-10 NOTE — PROGRESS NOTE ADULT - SUBJECTIVE AND OBJECTIVE BOX
VITAL SIGNS    Telemetry:    Vital Signs Last 24 Hrs  T(C): 36.3 (04-10-23 @ 05:25), Max: 36.6 (23 @ 12:48)  T(F): 97.4 (04-10-23 @ 05:25), Max: 97.9 (23 @ 12:48)  HR: 75 (04-10-23 @ 05:25) (73 - 84)  BP: 118/83 (04-10-23 @ 05:25) (100/67 - 118/83)  RR: 18 (04-10-23 @ 05:25) (18 - 18)  SpO2: 97% (04-10-23 @ 05:25) (91% - 97%)            :  -  04-10 @ 07:00  --------------------------------------------------------  IN: 932 mL / OUT: 1250 mL / NET: -318 mL       Daily     Daily Weight in k.8 (10 Apr 2023 08:07)  Admit Wt: Drug Dosing Weight  Height (cm): 190.5 (2023 16:47)  Weight (kg): 107.5 (2023 16:47)  BMI (kg/m2): 29.6 (2023 16:47)  BSA (m2): 2.36 (2023 16:47)    Bilirubin Total, Serum: 0.5 mg/dL (04-10 @ 06:25)    CAPILLARY BLOOD GLUCOSE              LABS:    :  -  04-10 @ 07:00  --------------------------------------------------------  IN: 932 mL / OUT: 1250 mL / NET: -318 mL    cret                        12.9   4.20  )-----------( 155      ( 10 Apr 2023 06:25 )             40.8     04-10    139  |  107  |  28<H>  ----------------------------<  77  4.1   |  23  |  1.23    Ca    9.4      10 Apr 2023 06:25  Mg     2.1     04-10    TPro  6.1  /  Alb  3.4  /  TBili  0.5  /  DBili  x   /  AST  33  /  ALT  30  /  AlkPhos  85  04-10    PT/INR - ( 10 Apr 2023 06:25 )   PT: 13.8 sec;   INR: 1.19 ratio         PTT - ( 10 Apr 2023 06:25 )  PTT:71.0 sec    ascorbic acid 500 milliGRAM(s) Oral daily  aspirin enteric coated 81 milliGRAM(s) Oral daily  cholecalciferol 2000 Unit(s) Oral daily  heparin  Infusion 1000 Unit(s)/Hr IV Continuous <Continuous>  sodium chloride 0.9% lock flush 3 milliLiter(s) IV Push every 8 hours      PHYSICAL EXAM    Subjective: "Hi.   Neurology: alert and oriented x 3, nonfocal, no gross deficits  CV : tele:  RSR  Sternal Wound :  CDI with dressing , Stable  Lungs: clear. RR easy, unlabored   Abdomen: soft, nontender, nondistended, positive bowel sounds, bowel movement   Neg N/V/D   :  pt voiding without difficulty   Extremities:   BANKS; edema, neg calf tenderness.   PPP bilaterally      PW:  Chest tubes:                 VITAL SIGNS    Telemetry:  afib    Vital Signs Last 24 Hrs  T(C): 36.3 (04-10-23 @ 05:25), Max: 36.6 (23 @ 12:48)  T(F): 97.4 (04-10-23 @ 05:25), Max: 97.9 (23 @ 12:48)  HR: 75 (04-10-23 @ 05:25) (73 - 84)  BP: 118/83 (04-10-23 @ 05:25) (100/67 - 118/83)  RR: 18 (04-10-23 @ 05:25) (18 - 18)  SpO2: 97% (04-10-23 @ 05:25) (91% - 97%)            :  -  04-10 @ 07:00  --------------------------------------------------------  IN: 932 mL / OUT: 1250 mL / NET: -318 mL       Daily     Daily Weight in k.8 (10 Apr 2023 08:07)  Admit Wt: Drug Dosing Weight  Height (cm): 190.5 (2023 16:47)  Weight (kg): 107.5 (2023 16:47)  BMI (kg/m2): 29.6 (2023 16:47)  BSA (m2): 2.36 (2023 16:47)    Bilirubin Total, Serum: 0.5 mg/dL (04-10 @ 06:25)    CAPILLARY BLOOD GLUCOSE              LABS:     @ 07:01  -  04-10 @ 07:00  --------------------------------------------------------  IN: 932 mL / OUT: 1250 mL / NET: -318 mL    cret                        12.9   4.20  )-----------( 155      ( 10 Apr 2023 06:25 )             40.8     04-10    139  |  107  |  28<H>  ----------------------------<  77  4.1   |  23  |  1.23    Ca    9.4      10 Apr 2023 06:25  Mg     2.1     04-10    TPro  6.1  /  Alb  3.4  /  TBili  0.5  /  DBili  x   /  AST  33  /  ALT  30  /  AlkPhos  85  0410    PT/INR - ( 10 Apr 2023 06:25 )   PT: 13.8 sec;   INR: 1.19 ratio         PTT - ( 10 Apr 2023 06:25 )  PTT:71.0 sec    ascorbic acid 500 milliGRAM(s) Oral daily  aspirin enteric coated 81 milliGRAM(s) Oral daily  cholecalciferol 2000 Unit(s) Oral daily  heparin  Infusion 1000 Unit(s)/Hr IV Continuous <Continuous>  sodium chloride 0.9% lock flush 3 milliLiter(s) IV Push every 8 hours      PHYSICAL EXAM    Subjective: "I had the cat scan."   Neurology: alert and oriented x 3, nonfocal, no gross deficits  CV : tele:  afib ; + murmur   Lungs: clear. RR easy, unlabored   Abdomen: soft, nontender, nondistended, positive bowel sounds, + bowel movement   Neg N/V/D   :  pt voiding without difficulty   Extremities:   BANKS; trace LE edema lt > rt, neg calf tenderness.   PPP bilaterally

## 2023-04-10 NOTE — PROGRESS NOTE ADULT - ASSESSMENT
88M PMH AF, bladder cancer, s/p ORIF presenting from PCP's office for AS evaluation. Patient reports being seen for medical clearance for bladder surgery (planned 4/12) yesterday and was found to have AS, was told to go to ED for evaluation for surgery/TAVR w/u by cardiologist Dr. Mccollum. Per HIE, pt's TTE showed EF 62% with severe AS and severe TR, moderate MR. Patient denies SOB, syncope, dizziness, CP, palpitations, or worsening LE swelling. Pt has chronic LE edema.    CT surgery consulted for surgical evaluation of severe AS, severe TR, moderate MR. All labs and imaging reviewed with on call CT surgeon, Dr. Lang. Plan for structural consult in AM, TAVR CT.     4/7 Admit to CT surgery service, structural consult in AM, TAVR CT  4/8: Stable overnight, Remains afib ? heparin gtt?  4/9 VSS.  Currently therapeutic on heparin gtt.  Pending TAVR CT tomorrow and eventual cardiac cath as part of TAVR w/u.  No CP or SOB  88M PMH AF, bladder cancer, s/p ORIF presenting from PCP's office for AS evaluation. Patient reports being seen for medical clearance for bladder surgery (planned 4/12) yesterday and was found to have AS, was told to go to ED for evaluation for surgery/TAVR w/u by cardiologist Dr. Mccollum. Per HIE, pt's TTE showed EF 62% with severe AS and severe TR, moderate MR. Patient denies SOB, syncope, dizziness, CP, palpitations, or worsening LE swelling. Pt has chronic LE edema.    CT surgery consulted for surgical evaluation of severe AS, severe TR, moderate MR. All labs and imaging reviewed with on call CT surgeon, Dr. Lang. Plan for structural consult in AM, TAVR CT.     4/7 Admit to CT surgery service, structural consult in AM, TAVR CT  4/8: Stable overnight, Remains afib ? heparin gtt?  4/9 VSS.  Currently therapeutic on heparin gtt.  Pending TAVR CT tomorrow and eventual cardiac cath as part of TAVR w/u.  No CP or SOB   4/10 VSS; afib  on heparin gttp for AC; TAVR CT done this am; if cr stable; cath in am tue with Dr. Pearson  OR date TBD

## 2023-04-11 ENCOUNTER — TRANSCRIPTION ENCOUNTER (OUTPATIENT)
Age: 88
End: 2023-04-11

## 2023-04-11 LAB
ANION GAP SERPL CALC-SCNC: 10 MMOL/L — SIGNIFICANT CHANGE UP (ref 5–17)
APTT BLD: 31.5 SEC — SIGNIFICANT CHANGE UP (ref 27.5–35.5)
APTT BLD: 75.5 SEC — HIGH (ref 27.5–35.5)
BUN SERPL-MCNC: 23 MG/DL — SIGNIFICANT CHANGE UP (ref 7–23)
CALCIUM SERPL-MCNC: 9.5 MG/DL — SIGNIFICANT CHANGE UP (ref 8.4–10.5)
CHLORIDE SERPL-SCNC: 106 MMOL/L — SIGNIFICANT CHANGE UP (ref 96–108)
CO2 SERPL-SCNC: 25 MMOL/L — SIGNIFICANT CHANGE UP (ref 22–31)
CREAT SERPL-MCNC: 1.2 MG/DL — SIGNIFICANT CHANGE UP (ref 0.5–1.3)
EGFR: 58 ML/MIN/1.73M2 — LOW
GLUCOSE SERPL-MCNC: 82 MG/DL — SIGNIFICANT CHANGE UP (ref 70–99)
HCT VFR BLD CALC: 43.2 % — SIGNIFICANT CHANGE UP (ref 39–50)
HGB BLD-MCNC: 14 G/DL — SIGNIFICANT CHANGE UP (ref 13–17)
INR BLD: 1.11 RATIO — SIGNIFICANT CHANGE UP (ref 0.88–1.16)
MCHC RBC-ENTMCNC: 31.5 PG — SIGNIFICANT CHANGE UP (ref 27–34)
MCHC RBC-ENTMCNC: 32.4 GM/DL — SIGNIFICANT CHANGE UP (ref 32–36)
MCV RBC AUTO: 97.1 FL — SIGNIFICANT CHANGE UP (ref 80–100)
NRBC # BLD: 0 /100 WBCS — SIGNIFICANT CHANGE UP (ref 0–0)
PLATELET # BLD AUTO: 158 K/UL — SIGNIFICANT CHANGE UP (ref 150–400)
POTASSIUM SERPL-MCNC: 4.1 MMOL/L — SIGNIFICANT CHANGE UP (ref 3.5–5.3)
POTASSIUM SERPL-SCNC: 4.1 MMOL/L — SIGNIFICANT CHANGE UP (ref 3.5–5.3)
PROTHROM AB SERPL-ACNC: 12.9 SEC — SIGNIFICANT CHANGE UP (ref 10.5–13.4)
RBC # BLD: 4.45 M/UL — SIGNIFICANT CHANGE UP (ref 4.2–5.8)
RBC # FLD: 13.2 % — SIGNIFICANT CHANGE UP (ref 10.3–14.5)
SODIUM SERPL-SCNC: 141 MMOL/L — SIGNIFICANT CHANGE UP (ref 135–145)
WBC # BLD: 4.11 K/UL — SIGNIFICANT CHANGE UP (ref 3.8–10.5)
WBC # FLD AUTO: 4.11 K/UL — SIGNIFICANT CHANGE UP (ref 3.8–10.5)

## 2023-04-11 PROCEDURE — 74420 UROGRAPHY RTRGR +-KUB: CPT | Mod: 26,RT

## 2023-04-11 PROCEDURE — 99152 MOD SED SAME PHYS/QHP 5/>YRS: CPT

## 2023-04-11 PROCEDURE — 52332 CYSTOSCOPY AND TREATMENT: CPT | Mod: RT

## 2023-04-11 PROCEDURE — ZZZZZ: CPT

## 2023-04-11 PROCEDURE — 93456 R HRT CORONARY ARTERY ANGIO: CPT | Mod: 26

## 2023-04-11 PROCEDURE — 52240 CYSTOSCOPY AND TREATMENT: CPT

## 2023-04-11 PROCEDURE — 99231 SBSQ HOSP IP/OBS SF/LOW 25: CPT

## 2023-04-11 RX ORDER — ONDANSETRON 8 MG/1
4 TABLET, FILM COATED ORAL ONCE
Refills: 0 | Status: COMPLETED | OUTPATIENT
Start: 2023-04-11 | End: 2023-04-11

## 2023-04-11 RX ADMIN — ONDANSETRON 4 MILLIGRAM(S): 8 TABLET, FILM COATED ORAL at 10:48

## 2023-04-11 RX ADMIN — SODIUM CHLORIDE 3 MILLILITER(S): 9 INJECTION INTRAMUSCULAR; INTRAVENOUS; SUBCUTANEOUS at 05:14

## 2023-04-11 RX ADMIN — Medication 500 MILLIGRAM(S): at 11:24

## 2023-04-11 RX ADMIN — Medication 81 MILLIGRAM(S): at 11:24

## 2023-04-11 RX ADMIN — SODIUM CHLORIDE 3 MILLILITER(S): 9 INJECTION INTRAMUSCULAR; INTRAVENOUS; SUBCUTANEOUS at 16:32

## 2023-04-11 RX ADMIN — SODIUM CHLORIDE 3 MILLILITER(S): 9 INJECTION INTRAMUSCULAR; INTRAVENOUS; SUBCUTANEOUS at 22:13

## 2023-04-11 RX ADMIN — Medication 2000 UNIT(S): at 11:25

## 2023-04-11 NOTE — CONSULT NOTE ADULT - SUBJECTIVE AND OBJECTIVE BOX
HPI:  88M PMH AF, bladder cancer, s/p ORIF presented from PCP's office for AS evaluation. Patient reports being seen for medical clearance for bladder surgery (planned 4/12) and was found to have AS, was told to go to ED for evaluation for surgery/TAVR w/u by cardiologist Dr. Mccollum. Per DON, pt's TTE showed EF 62% with severe AS and severe TR, moderate MR. Patient denies SOB, syncope, dizziness, CP, palpitations, or worsening LE swelling. Pt has chronic LE edema.  CT surgery consulted for surgical evaluation of severe AS, severe TR, moderate MR. Strucutal heart was consulted, patient underwent TAVR CT and cath.  Urology consulted for scheduled TURBT for 4/12.   Patient seen and examined at bedside by urology team. Patient admits to being hungry after his procedure and endorsed  mild groin pain which is now resolved. Patient also complaining of mild chronic right thigh pain.   Denies hematuria, dysuria, chest pain, SOB. Offers no other complaints.    Allergies: No Known Allergies    PAST MEDICAL & SURGICAL HISTORY:  Chronic atrial fibrillation  Bladder cancer  S/P hip replacement, right        HOME MEDICATIONS:     --------------------------------------------------------------------------------------  Home Medications:  aspirin 81 mg oral tablet: orally once a day (05 Apr 2023 13:34)  Vitamin C 500 mg oral tablet: 1 tab(s) orally once a day (05 Apr 2023 13:34)  Vitamin D3: orally once a day (05 Apr 2023 13:34)    --------------------------------------------------------------------------------------    CURRENT MEDICATIONS:   --------------------------------------------------------------------------------------    Gastrointestinal Medications  ascorbic acid 500 milliGRAM(s) Oral daily  cholecalciferol 2000 Unit(s) Oral daily  sodium chloride 0.9% lock flush 3 milliLiter(s) IV Push every 8 hours  --------------------------------------------------------------------------------------    VITAL SIGNS, INS/OUTS (last 24 hours):  --------------------------------------------------------------------------------------  Vital Signs Last 24 Hrs  T(C): 36.4 (11 Apr 2023 09:55), Max: 36.7 (11 Apr 2023 05:20)  T(F): 97.5 (11 Apr 2023 09:55), Max: 98.1 (11 Apr 2023 05:20)  HR: 78 (11 Apr 2023 11:00) (56 - 89)  BP: 151/98 (11 Apr 2023 11:00) (113/76 - 151/98)  BP(mean): 114 (11 Apr 2023 10:40) (102 - 114)  RR: 18 (11 Apr 2023 11:00) (14 - 20)  SpO2: 94% (11 Apr 2023 11:00) (94% - 100%)    Parameters below as of 11 Apr 2023 11:00  Patient On (Oxygen Delivery Method): room air      10 Apr 2023 07:01  -  11 Apr 2023 07:00  --------------------------------------------------------  IN:    Oral Fluid: 720 mL  Total IN: 720 mL    OUT:  Total OUT: 0 mL    Total NET: 720 mL    --------------------------------------------------------------------------------------    EXAM  GENERAL  Exam: NAD    HEENT  Exam: NCAT    RESPIRATORY  Exam: nonlabored on room air     HEMATOLOGIC  [x] DVT Prophylaxis: aspirin enteric coated 81 milliGRAM(s) Oral daily      LABS:  cret                        14.0   4.11  )-----------( 158      ( 11 Apr 2023 06:01 )             43.2     04-11    141  |  106  |  23  ----------------------------<  82  4.1   |  25  |  1.20    Ca    9.5      11 Apr 2023 06:01  Mg     2.1     04-10    TPro  6.1  /  Alb  3.4  /  TBili  0.5  /  DBili  x   /  AST  33  /  ALT  30  /  AlkPhos  85  04-10    PT/INR - ( 10 Apr 2023 06:25 )   PT: 13.8 sec;   INR: 1.19 ratio         PTT - ( 11 Apr 2023 06:01 )  PTT:75.5 sec HPI:  88M PMH AF, bladder cancer, s/p ORIF presented from PCP's office for AS evaluation. Patient reports being seen for medical clearance for bladder surgery (planned 4/12) and was found to have AS, was told to go to ED for evaluation for surgery/TAVR w/u by cardiologist Dr. Mccollum. Per DON, pt's TTE showed EF 62% with severe AS and severe TR, moderate MR. Patient denies SOB, syncope, dizziness, CP, palpitations, or worsening LE swelling. Pt has chronic LE edema.  CT surgery consulted for surgical evaluation of severe AS, severe TR, moderate MR. Structural heart was consulted, patient underwent TAVR CT and cath.  Urology consulted for scheduled TURBT for 4/12.   Patient seen and examined at bedside by urology team. Patient admits to being hungry after his procedure and endorsed  mild groin pain which is now resolved. Patient also complaining of mild chronic right thigh pain.   Denies hematuria, dysuria, chest pain, SOB. Offers no other complaints.    Allergies: No Known Allergies    PAST MEDICAL & SURGICAL HISTORY:  Chronic atrial fibrillation  Bladder cancer  S/P hip replacement, right        HOME MEDICATIONS:     --------------------------------------------------------------------------------------  Home Medications:  aspirin 81 mg oral tablet: orally once a day (05 Apr 2023 13:34)  Vitamin C 500 mg oral tablet: 1 tab(s) orally once a day (05 Apr 2023 13:34)  Vitamin D3: orally once a day (05 Apr 2023 13:34)    --------------------------------------------------------------------------------------    CURRENT MEDICATIONS:   --------------------------------------------------------------------------------------    Gastrointestinal Medications  ascorbic acid 500 milliGRAM(s) Oral daily  cholecalciferol 2000 Unit(s) Oral daily  sodium chloride 0.9% lock flush 3 milliLiter(s) IV Push every 8 hours  --------------------------------------------------------------------------------------    VITAL SIGNS, INS/OUTS (last 24 hours):  --------------------------------------------------------------------------------------  Vital Signs Last 24 Hrs  T(C): 36.4 (11 Apr 2023 09:55), Max: 36.7 (11 Apr 2023 05:20)  T(F): 97.5 (11 Apr 2023 09:55), Max: 98.1 (11 Apr 2023 05:20)  HR: 78 (11 Apr 2023 11:00) (56 - 89)  BP: 151/98 (11 Apr 2023 11:00) (113/76 - 151/98)  BP(mean): 114 (11 Apr 2023 10:40) (102 - 114)  RR: 18 (11 Apr 2023 11:00) (14 - 20)  SpO2: 94% (11 Apr 2023 11:00) (94% - 100%)    Parameters below as of 11 Apr 2023 11:00  Patient On (Oxygen Delivery Method): room air      10 Apr 2023 07:01  -  11 Apr 2023 07:00  --------------------------------------------------------  IN:    Oral Fluid: 720 mL  Total IN: 720 mL    OUT:  Total OUT: 0 mL    Total NET: 720 mL    --------------------------------------------------------------------------------------    EXAM  GENERAL  Exam: NAD    HEENT  Exam: NCAT    RESPIRATORY  Exam: nonlabored on room air     HEMATOLOGIC  [x] DVT Prophylaxis: aspirin enteric coated 81 milliGRAM(s) Oral daily      LABS:  cret                        14.0   4.11  )-----------( 158      ( 11 Apr 2023 06:01 )             43.2     04-11    141  |  106  |  23  ----------------------------<  82  4.1   |  25  |  1.20    Ca    9.5      11 Apr 2023 06:01  Mg     2.1     04-10    TPro  6.1  /  Alb  3.4  /  TBili  0.5  /  DBili  x   /  AST  33  /  ALT  30  /  AlkPhos  85  04-10    PT/INR - ( 10 Apr 2023 06:25 )   PT: 13.8 sec;   INR: 1.19 ratio         PTT - ( 11 Apr 2023 06:01 )  PTT:75.5 sec

## 2023-04-11 NOTE — CONSULT NOTE ADULT - NSCONSULTADDITIONALINFOA_GEN_ALL_CORE
as above  for large TURBT in am  heparin already stopped-to get PT/PTT today  other labs not needed  s/w cardiology-all are in agreement-cleared for OR tomorrow

## 2023-04-11 NOTE — PROGRESS NOTE ADULT - PROBLEM SELECTOR PLAN 1
Severe Tricuspid regurg    c/w ASA 81  Preop w/u in progress- struct heart following  TAVR CT done   OR date TBD Severe Tricuspid regurg    c/w ASA 81  Preop w/u   for TAVR  - struct heart following    OR   for  TAVR in house vs outpt    TURBT in am  w urology    NPO after midnight   need repeat  coags tonight

## 2023-04-11 NOTE — PROGRESS NOTE ADULT - SUBJECTIVE AND OBJECTIVE BOX
VITAL SIGNS    Telemetry:   afib  78  brief  39    Vital Signs Last 24 Hrs  T(C): 36.7 (04-11-23 @ 08:18), Max: 36.7 (04-11-23 @ 05:20)  T(F): 98 (04-11-23 @ 08:18), Max: 98.1 (04-11-23 @ 05:20)  HR: 67 (04-11-23 @ 08:18) (64 - 84)  BP: 139/98 (04-11-23 @ 08:18) (113/76 - 139/98)  RR: 18 (04-11-23 @ 08:18) (18 - 18)  SpO2: 99% (04-11-23 @ 08:18) (95% - 99%)                   Daily     Daily         CAPILLARY BLOOD GLUCOS                     PHYSICAL EXAM    Neurology: alert and oriented x 3, moves all extremities with no defecits  CV :   Sternal Wound  Lungs:   Abdomen:   Extremities:                                            VITAL SIGNS    Telemetry:   afib  78  brief  39    Vital Signs Last 24 Hrs  T(C): 36.7 (04-11-23 @ 08:18), Max: 36.7 (04-11-23 @ 05:20)  T(F): 98 (04-11-23 @ 08:18), Max: 98.1 (04-11-23 @ 05:20)  HR: 67 (04-11-23 @ 08:18) (64 - 84)  BP: 139/98 (04-11-23 @ 08:18) (113/76 - 139/98)  RR: 18 (04-11-23 @ 08:18) (18 - 18)  SpO2: 99% (04-11-23 @ 08:18) (95% - 99%)                   Daily     Daily         CAPILLARY BLOOD GLUCOS                     PHYSICAL EXAM  s;   No SOB  NO CP'  Neurology: alert and oriented x 3, moves all extremities with no defecits  CV :    IRRR    Lungs:   CTA  Abdomen:   Extremities:   plus one pedal edema

## 2023-04-11 NOTE — CONSULT NOTE ADULT - ASSESSMENT
88M PMH AF, bladder cancer, s/p ORIF presenting from PCP's office for AS evaluation, admitted to CT surgery for surgery/TAVR workup, scheduled for TURBT 4/12    Plan:  - follow up medical clearance after cath today   - NPO past midnight   - obtain AM labs: CBC, CMP, PT/PTT/INR, type and screen, COVID  - hold heparin gtt 6 hours prior to OR   - page urology with questions  - plan tentative, pending discussion with Dr. Billingsley    Urology  88M PMH AF, bladder cancer, s/p ORIF presenting from PCP's office for AS evaluation, admitted to CT surgery for surgery/TAVR workup, scheduled for TURBT 4/12    Plan:  - follow up medical clearance after cath today   - NPO past midnight ordered  - COVID neg (4/10)  - obtain AM labs: CBC, CMP, PT/PTT/INR, type and screen  - hold heparin gtt 6 hours prior to OR, currently pt is off heparin  - page urology with questions  - plan tentative, pending discussion with Dr. Billingsley    Urology 88M PMH AF, bladder cancer, s/p ORIF presenting from PCP's office for AS evaluation, admitted to CT surgery for surgery/TAVR workup, scheduled for TURBT 4/12    Plan:  - follow up medical clearance after cath today   - consult for cardiac anesthesiologist for OR placed   - NPO past midnight ordered  - COVID neg (4/10)  - obtain AM coags PT PTT INR  - hold heparin gtt 6 hours prior to OR, currently pt is off heparin  - page urology with questions    Urology 88M PMH AF, bladder cancer, s/p ORIF presenting from PCP's office for AS evaluation, admitted to CT surgery for surgery/TAVR workup, scheduled for TURBT 4/12    Plan:  - please document medical clearance for OR tomorrow  - consult for cardiac anesthesiologist for OR placed   - NPO past midnight ordered  - COVID neg (4/10)  - obtain AM coags PT PTT INR  - hold heparin gtt 6 hours prior to OR, currently pt is off heparin  - page urology with questions    Urology 88M PMH AF, bladder cancer, s/p ORIF presenting from PCP's office for AS evaluation, admitted to CT surgery for surgery/TAVR workup, scheduled for TURBT 4/12    Plan:  - please document medical clearance for OR tomorrow  - consult for cardiac anesthesiologist for OR placed   - NPO past midnight ordered  - COVID neg (4/10)  - obtain PM coags  - obtain AM coags PT PTT INR  - hold heparin gtt 6 hours prior to OR, currently pt is off heparin  - page urology with questions    Urology

## 2023-04-11 NOTE — PROGRESS NOTE ADULT - ASSESSMENT
88M PMH AF, bladder cancer, s/p ORIF presenting from PCP's office for AS evaluation. Patient reports being seen for medical clearance for bladder surgery (planned 4/12) yesterday and was found to have AS, was told to go to ED for evaluation for surgery/TAVR w/u by cardiologist Dr. Mccollum. Per HIE, pt's TTE showed EF 62% with severe AS and severe TR, moderate MR. Patient denies SOB, syncope, dizziness, CP, palpitations, or worsening LE swelling. Pt has chronic LE edema.    CT surgery consulted for surgical evaluation of severe AS, severe TR, moderate MR. All labs and imaging reviewed with on call CT surgeon, Dr. Lang. Plan for structural consult in AM, TAVR CT.     4/7 Admit to CT surgery service, structural consult in AM, TAVR CT  4/8: Stable overnight, Remains afib ? heparin gtt?  4/9 VSS.  Currently therapeutic on heparin gtt.  Pending TAVR CT tomorrow and eventual cardiac cath as part of TAVR w/u.  No CP or SOB   4/10 VSS; afib  on heparin gttp for AC; TAVR CT done this am; if cr stable; cath in am tue with Dr. Pearson  OR date TBD   4/11 cardiac cath 88M PMH AF, bladder cancer, s/p ORIF presenting from PCP's office for AS evaluation. Patient reports being seen for medical clearance for bladder surgery (planned 4/12) yesterday and was found to have AS, was told to go to ED for evaluation for surgery/TAVR w/u by cardiologist Dr. Mccollum. Per HIE, pt's TTE showed EF 62% with severe AS and severe TR, moderate MR. Patient denies SOB, syncope, dizziness, CP, palpitations, or worsening LE swelling. Pt has chronic LE edema.    CT surgery consulted for surgical evaluation of severe AS, severe TR, moderate MR. All labs and imaging reviewed with on call CT surgeon, Dr. Lang. Plan for structural consult in AM, TAVR CT.     4/7 Admit to CT surgery service, structural consult in AM, TAVR CT  4/8: Stable overnight, Remains afib ? heparin gtt?  4/9 VSS.  Currently therapeutic on heparin gtt.  Pending TAVR CT tomorrow and eventual cardiac cath as part of TAVR w/u.  No CP or SOB   4/10 VSS; afib  on heparin gttp for AC; TAVR CT done this am; if cr stable; cath in am tue with Dr. Pearson  OR date TBD   4/11 cardiac cath  non obs cors disease    Dr Pearson  cardio to clear pt for bladder surgery   hep gtt no longer needed

## 2023-04-11 NOTE — PROGRESS NOTE ADULT - SUBJECTIVE AND OBJECTIVE BOX
Subjective  No acute events reported overnight.  The patient denies any chest pain/tightness/discomfort or shortness of breath at rest or upon minimal exertion.  No fevers, chills or sweats.    Review of systems  14 point review of systems is otherwise unremarkable except what described above in history of present illness    Physical examination  No apparent distress, alert and oriented x3, appropriate affect  JVD is not elevated, supple, no lymphadenopathy  Irregular regular with 2 out of 6 crescendo decrescendo murmur heard loudest at the right upper sternal border rating to the carotids  Positive bowel sound, soft, nontender, nondistended, no mass and guarding or rebound tenderness  No clubbing, cyanosis   Left leg greater than right leg (chronic)  Moving all extremities spontaneously  No focal deficits  T(C): 36.5 (04-11-23 @ 11:25), Max: 36.7 (04-11-23 @ 05:20)  HR: 83 (04-11-23 @ 16:36) (54 - 89)  BP: 110/71 (04-11-23 @ 16:36) (110/71 - 151/98)  RR: 18 (04-11-23 @ 16:36) (14 - 20)  SpO2: 98% (04-11-23 @ 16:36) (94% - 100%)  Wt(kg): --  04-10 @ 07:01  -  04-11 @ 07:00  --------------------------------------------------------  IN: 720 mL / OUT: 0 mL / NET: 720 mL    04-11 @ 07:01  -  04-11 @ 18:20  --------------------------------------------------------  IN: 240 mL / OUT: 0 mL / NET: 240 mL    MEDICATIONS  (STANDING):  ascorbic acid 500 milliGRAM(s) Oral daily  aspirin enteric coated 81 milliGRAM(s) Oral daily  cholecalciferol 2000 Unit(s) Oral daily  sodium chloride 0.9% lock flush 3 milliLiter(s) IV Push every 8 hours    Home Medications:  aspirin 81 mg oral tablet: orally once a day (05 Apr 2023 13:34)  Vitamin C 500 mg oral tablet: 1 tab(s) orally once a day (05 Apr 2023 13:34)  Vitamin D3: orally once a day (05 Apr 2023 13:34)               14.0   4.11  )-----------( 158      ( 11 Apr 2023 06:01 )             43.2   04-11    141  |  106  |  23  ----------------------------<  82  4.1   |  25  |  1.20    Ca    9.5      11 Apr 2023 06:01  Mg     2.1     04-10    TPro  6.1  /  Alb  3.4  /  TBili  0.5  /  DBili  x   /  AST  33  /  ALT  30  /  AlkPhos  85  04-10  PT/INR - ( 11 Apr 2023 17:59 )   PT: 12.9 sec;   INR: 1.11 ratio    PTT - ( 11 Apr 2023 17:59 )  PTT:31.5 sec    Assessment/plan  Aortic Stenosis  --Mr. Hammonds is an 88-year-old gentleman with a past medical history significant for atrial fibrillation, bladder cancer and ORIF.  The patient does not have any significant cardiopulmonary complaints at rest or upon exertion.  He is not in heart failure.  He is found to have moderate–severe aortic valve stenosis.  The patient is able to carry out greater than 2 METS of physical activity.  He is scheduled to undergo a TURBT on 4/12/2023.  The patient is at an acceptable risk to proceed.  It is recommended that a cardiac anesthesiologist be present at the time of his procedure due to his concomitant moderate–severe aortic valve stenosis and coronary artery disease.  --Continue aspirin 81 mg daily.  --The patients imaging studies were reviewed with structural echocardiography team/Dr. Gonzales.  Based upon echocardiogram imaging and and aortic valve calcium score (Agatston and calcium score 1278) the patient has moderate–severe aortic valve stenosis.  At this time it is recommended that he get follow-up in the valve clinic in approximately 3 to 4 months at which time a repeat TTE would be performed.  --Cardiac catheterization was performed earlier today that demonstrated two-vessel coronary artery disease (diagonal and obtuse marginal).  Recommend medical management of coronary artery disease.  --Carotid ultrasound on 4/7/2023 demonstrate no significant hemodynamic stenosis of either carotid artery.  --Elevated CHADSVASC score 2.  The patient has a possible filling defect in the left atrial appendage on delayed phase imaging.  Recommended the patient be restarted back on a heparin drip once cleared by urology team.  May consider ALEJANDRO as an outpatient if clinically indicated.  --Venous duplex study on 4/7/2023 demonstrate no evidence of lower extremity DVT.  --Continue telemetry.  --EKG demonstrates atrial fibrillation, left axis deviation and right bundle branch block ( ms/QTc 439).  --Aim for potassium greater than 4 magnesium greater than 2.  --Out of bed to chair as tolerated.  The patient uses a walker at baseline.    All questions and concerns of the patient were addressed.    Findings and plan were discussed with cardiac surgery/Dr. Lang, outpatient cardiology/Dr. Mccollum and urology/Dr. Billingsley.

## 2023-04-12 ENCOUNTER — RESULT REVIEW (OUTPATIENT)
Age: 88
End: 2023-04-12

## 2023-04-12 ENCOUNTER — APPOINTMENT (OUTPATIENT)
Dept: UROLOGY | Facility: HOSPITAL | Age: 88
End: 2023-04-12

## 2023-04-12 LAB
ANION GAP SERPL CALC-SCNC: 11 MMOL/L — SIGNIFICANT CHANGE UP (ref 5–17)
ANION GAP SERPL CALC-SCNC: 11 MMOL/L — SIGNIFICANT CHANGE UP (ref 5–17)
BUN SERPL-MCNC: 33 MG/DL — HIGH (ref 7–23)
BUN SERPL-MCNC: 39 MG/DL — HIGH (ref 7–23)
CALCIUM SERPL-MCNC: 9.2 MG/DL — SIGNIFICANT CHANGE UP (ref 8.4–10.5)
CALCIUM SERPL-MCNC: 9.5 MG/DL — SIGNIFICANT CHANGE UP (ref 8.4–10.5)
CHLORIDE SERPL-SCNC: 104 MMOL/L — SIGNIFICANT CHANGE UP (ref 96–108)
CHLORIDE SERPL-SCNC: 107 MMOL/L — SIGNIFICANT CHANGE UP (ref 96–108)
CO2 SERPL-SCNC: 20 MMOL/L — LOW (ref 22–31)
CO2 SERPL-SCNC: 23 MMOL/L — SIGNIFICANT CHANGE UP (ref 22–31)
CREAT SERPL-MCNC: 1.13 MG/DL — SIGNIFICANT CHANGE UP (ref 0.5–1.3)
CREAT SERPL-MCNC: 1.49 MG/DL — HIGH (ref 0.5–1.3)
EGFR: 45 ML/MIN/1.73M2 — LOW
EGFR: 63 ML/MIN/1.73M2 — SIGNIFICANT CHANGE UP
GLUCOSE SERPL-MCNC: 140 MG/DL — HIGH (ref 70–99)
GLUCOSE SERPL-MCNC: 79 MG/DL — SIGNIFICANT CHANGE UP (ref 70–99)
HCT VFR BLD CALC: 41.7 % — SIGNIFICANT CHANGE UP (ref 39–50)
HCT VFR BLD CALC: 42.8 % — SIGNIFICANT CHANGE UP (ref 39–50)
HGB BLD-MCNC: 13.5 G/DL — SIGNIFICANT CHANGE UP (ref 13–17)
HGB BLD-MCNC: 14 G/DL — SIGNIFICANT CHANGE UP (ref 13–17)
MCHC RBC-ENTMCNC: 31.6 PG — SIGNIFICANT CHANGE UP (ref 27–34)
MCHC RBC-ENTMCNC: 31.8 PG — SIGNIFICANT CHANGE UP (ref 27–34)
MCHC RBC-ENTMCNC: 32.4 GM/DL — SIGNIFICANT CHANGE UP (ref 32–36)
MCHC RBC-ENTMCNC: 32.7 GM/DL — SIGNIFICANT CHANGE UP (ref 32–36)
MCV RBC AUTO: 96.6 FL — SIGNIFICANT CHANGE UP (ref 80–100)
MCV RBC AUTO: 98.1 FL — SIGNIFICANT CHANGE UP (ref 80–100)
NRBC # BLD: 0 /100 WBCS — SIGNIFICANT CHANGE UP (ref 0–0)
NRBC # BLD: 0 /100 WBCS — SIGNIFICANT CHANGE UP (ref 0–0)
PLATELET # BLD AUTO: 167 K/UL — SIGNIFICANT CHANGE UP (ref 150–400)
PLATELET # BLD AUTO: 168 K/UL — SIGNIFICANT CHANGE UP (ref 150–400)
POTASSIUM SERPL-MCNC: 4.4 MMOL/L — SIGNIFICANT CHANGE UP (ref 3.5–5.3)
POTASSIUM SERPL-MCNC: 4.9 MMOL/L — SIGNIFICANT CHANGE UP (ref 3.5–5.3)
POTASSIUM SERPL-SCNC: 4.4 MMOL/L — SIGNIFICANT CHANGE UP (ref 3.5–5.3)
POTASSIUM SERPL-SCNC: 4.9 MMOL/L — SIGNIFICANT CHANGE UP (ref 3.5–5.3)
RBC # BLD: 4.25 M/UL — SIGNIFICANT CHANGE UP (ref 4.2–5.8)
RBC # BLD: 4.43 M/UL — SIGNIFICANT CHANGE UP (ref 4.2–5.8)
RBC # FLD: 13.2 % — SIGNIFICANT CHANGE UP (ref 10.3–14.5)
RBC # FLD: 13.2 % — SIGNIFICANT CHANGE UP (ref 10.3–14.5)
SODIUM SERPL-SCNC: 138 MMOL/L — SIGNIFICANT CHANGE UP (ref 135–145)
SODIUM SERPL-SCNC: 138 MMOL/L — SIGNIFICANT CHANGE UP (ref 135–145)
WBC # BLD: 4.95 K/UL — SIGNIFICANT CHANGE UP (ref 3.8–10.5)
WBC # BLD: 5.48 K/UL — SIGNIFICANT CHANGE UP (ref 3.8–10.5)
WBC # FLD AUTO: 4.95 K/UL — SIGNIFICANT CHANGE UP (ref 3.8–10.5)
WBC # FLD AUTO: 5.48 K/UL — SIGNIFICANT CHANGE UP (ref 3.8–10.5)

## 2023-04-12 PROCEDURE — 99231 SBSQ HOSP IP/OBS SF/LOW 25: CPT

## 2023-04-12 PROCEDURE — 74420 UROGRAPHY RTRGR +-KUB: CPT | Mod: 26,RT

## 2023-04-12 PROCEDURE — 52240 CYSTOSCOPY AND TREATMENT: CPT

## 2023-04-12 PROCEDURE — 88307 TISSUE EXAM BY PATHOLOGIST: CPT | Mod: 26

## 2023-04-12 PROCEDURE — 52332 CYSTOSCOPY AND TREATMENT: CPT | Mod: RT

## 2023-04-12 DEVICE — GUIDEWIRE SENSOR DUAL-FLEX NITINOL STRAIGHT .035" X 150CM
Type: IMPLANTABLE DEVICE | Status: NON-FUNCTIONAL
Removed: 2023-04-12

## 2023-04-12 DEVICE — URETERAL STENT CONTOUR 6FR 26CM
Type: IMPLANTABLE DEVICE | Status: NON-FUNCTIONAL
Removed: 2023-04-12

## 2023-04-12 DEVICE — KIT A-LINE 1LUM 20G X 12CM SAFE KIT
Type: IMPLANTABLE DEVICE | Status: NON-FUNCTIONAL
Removed: 2023-04-12

## 2023-04-12 DEVICE — URETERAL CATH OPEN END 5FR 70CM
Type: IMPLANTABLE DEVICE | Status: NON-FUNCTIONAL
Removed: 2023-04-12

## 2023-04-12 RX ORDER — CHOLECALCIFEROL (VITAMIN D3) 125 MCG
2000 CAPSULE ORAL DAILY
Refills: 0 | Status: DISCONTINUED | OUTPATIENT
Start: 2023-04-12 | End: 2023-04-14

## 2023-04-12 RX ORDER — SODIUM CHLORIDE 9 MG/ML
3 INJECTION INTRAMUSCULAR; INTRAVENOUS; SUBCUTANEOUS EVERY 8 HOURS
Refills: 0 | Status: DISCONTINUED | OUTPATIENT
Start: 2023-04-12 | End: 2023-04-14

## 2023-04-12 RX ORDER — ASPIRIN/CALCIUM CARB/MAGNESIUM 324 MG
81 TABLET ORAL DAILY
Refills: 0 | Status: DISCONTINUED | OUTPATIENT
Start: 2023-04-12 | End: 2023-04-14

## 2023-04-12 RX ORDER — ASCORBIC ACID 60 MG
500 TABLET,CHEWABLE ORAL DAILY
Refills: 0 | Status: DISCONTINUED | OUTPATIENT
Start: 2023-04-12 | End: 2023-04-14

## 2023-04-12 RX ORDER — LANOLIN ALCOHOL/MO/W.PET/CERES
3 CREAM (GRAM) TOPICAL AT BEDTIME
Refills: 0 | Status: DISCONTINUED | OUTPATIENT
Start: 2023-04-12 | End: 2023-04-14

## 2023-04-12 RX ORDER — CEFAZOLIN SODIUM 1 G
1000 VIAL (EA) INJECTION EVERY 8 HOURS
Refills: 0 | Status: DISCONTINUED | OUTPATIENT
Start: 2023-04-12 | End: 2023-04-14

## 2023-04-12 RX ADMIN — Medication 100 MILLIGRAM(S): at 22:24

## 2023-04-12 RX ADMIN — SODIUM CHLORIDE 3 MILLILITER(S): 9 INJECTION INTRAMUSCULAR; INTRAVENOUS; SUBCUTANEOUS at 05:47

## 2023-04-12 RX ADMIN — Medication 3 MILLIGRAM(S): at 22:24

## 2023-04-12 NOTE — PRE-ANESTHESIA EVALUATION ADULT - NSANTHPMHFT_GEN_ALL_CORE
88M otherwise healthy male presents to PST for scheduled large transurethral resection of bladder tumor on 4/12/23 with . CT surgery consulted for surgical evaluation of severe AS, severe TR, moderate MR. Cleared by cardiology, Dr. Pearson. 88M male presents to PST for scheduled large transurethral resection of bladder tumor on 4/12/23 with Dr. Billingsley. CT surgery consulted for surgical evaluation of severe AS, severe TR, moderate MR. Cleared by cardiology, Dr. Pearson.

## 2023-04-12 NOTE — PROGRESS NOTE ADULT - SUBJECTIVE AND OBJECTIVE BOX
VITAL SIGNS    Telemetry:     afib 80    Vital Signs Last 24 Hrs  T(C): 36.7 (04-12-23 @ 04:57), Max: 36.7 (04-12-23 @ 04:57)  T(F): 98 (04-12-23 @ 04:57), Max: 98 (04-12-23 @ 04:57)  HR: 71 (04-12-23 @ 04:57) (54 - 89)  BP: 113/73 (04-12-23 @ 04:57) (104/73 - 151/98)  RR: 18 (04-12-23 @ 04:57) (14 - 20)  SpO2: 95% (04-12-23 @ 04:57) (94% - 100%)                   Daily     Daily         CAPILLARY BLOOD G                    PHYSICAL EXAM  s:  no sob  No CP  Neurology: alert and oriented x 3, moves all extremities with no defecits  CV :  RRR    Lungs:   CTA B/L  Abdomen: soft, nontender, nondistended, positive bowel sounds  Extremities:       + 1 pedal edema

## 2023-04-12 NOTE — PRE-ANESTHESIA EVALUATION ADULT - NSRADCARDRESULTSFT_GEN_ALL_CORE
ECHO FINDINGS:  Left Ventricle:  The left ventricular systolic function is normal with a calculated ejection fraction of 63 % by 3D.     Right Ventricle:  Mildly enlarged right ventricular cavity size and reduced systolic function. Right ventricular free wall strain is -5 %. Tricuspid annular plane systolic excursion (TAPSE) is 1.7 cm (normal >=1.7 cm). Tricuspid annular tissue Doppler S' is 13.0 cm/s (normal >10 cm/s).     Left Atrium:  The left atrium is moderately dilated, with an indexed volume of 19 ml/m².     Right Atrium:  The right atrium is moderately dilated with an indexed volume of 15.69 ml/m².     Interatrial Septum:  The interatrial septum appears intact.     Aortic Valve:  The aortic valve appears trileaflet. There is moderate calcification of the aortic valve leaflets. There is moderate aortic stenosis. There is no evidence of aortic regurgitation.     Mitral Valve:  Thickened mitral valve leaflets. There is moderate mitral regurgitation.     Tricuspid Valve:  There is tricuspid valve annular dilation. There is moderate tricuspid regurgitation.     Pulmonic Valve:  There is trace pulmonic regurgitation.     Pericardium:  No pericardial effusion seen.  ____________________________________________________________________  QUANTITATIVE DATA  Left Ventricle Measurements               Indexed to BSA  IVSd (2D):   1.2 cm  LVPWd (2D):  0.9 cm  LVIDd (2D):  4.8 cm  LVIDs (2D):  3.2 cm  LV Mass:     180 g  76.5 g/m²     MV E Vmax:    1.16 m/s  MV A Vmax:    0.29 m/s  MV E/A:       4.00  e' lateral:   9.46 cm/s  e' medial:9.03 cm/s  E/e' lateral: 12.26  E/e' medial:  12.85  E/e' Average: 12.55    Aorta Measurements     Ao Sinus: 3.4 cm  Ao Asc:   3.9 cm       Left Atrium Measurements                      Indexed to BSA  LA Diam 2D: 5.20  LA Vol BP:  44.0 ml 19 ml/m²    Right Ventricle Measurements Right Atrial Measurements     TAPSE:      1.7 cm           RA Vol:       37.00 ml  TV [place]. S': 13.40 cm/s       RA Vol Index: 15.69 ml/m²    LVOT / RVOT/ Qp/Qs Data:  LVOT Diameter:   2.40 cm  LVOT Vmax:       0.68 m/s  LVOT VTI:        16.80 cm  LVOT SV:         76.0 ml  LVOT SV Indexed: 32.22 ml/m²    Aortic Valve Measurements     AV Vmax:           281.4 cm/s  AV Peak Gradient:  31.7 mmHg  AV Mean Gradient:  17.8 mmHg  AV VTI:            68.6 cm  AV VTI Ratio: 0.24  AoV EOA, Contin:   1.11 cm²  AoV EOA, Contin i: 0.47 cm²/m²    Mitral Valve Measurements     MV E Vmax: 1.2 m/s  MV A Vmax: 0.3 m/s  MV E/A:    4.0       Tricuspid Valve Measurements     TR Vmax:          2.8 m/s  TR Peak Gradient: 30.2 mmHg      Cardiac Cath Diagnostic Conclusions:     Severe one vessel obstructive coronary artery disease   --Middle segment of 2nd diagonal artery   Normal left main coronary artery   Right dominant system   Elevated right atrial pressure (mRA 8mmHg with a V wave of 11mmHg)   Normal pulmonary artery pressure (sPAP 34mmHg, dPAP 10mmHg, mPAP  21mmHg)  PCWP = 13mmHg with a V wave of 16mmHg   PAsat = 67.5% // AOsat = 97%    Silviano CO // CI = 4.96l/min // 2.14l/min/m2     Diagnostic Findings:     Coronary Angiography   LM   Left main artery: The segment is visually normal in size and  structure.    LAD   First diagonal: There is a 40 % stenosis. Second diagonal: There is an  80 % stenosis in the middle third portion of the  segment. Mid left anterior descending: There is a 50 % stenosis.  Distal left anterior descending: There is a 40 % stenosis in  the distal third portion of the segment.      CX   Proximal circumflex: There is a 10 % stenosis. First obtuse marginal:  There is a 60 % stenosis in the distal third portion of the  segment.      RCA   Mid right coronary artery: There is a 20 % stenosis. Proximal right  coronary artery: There is a 30 % stenosis. Right posterior  descending artery: There is a 35 % stenosis. Right posterior  atrioventricular: There is a 35 % stenosis.

## 2023-04-12 NOTE — PROGRESS NOTE ADULT - PROBLEM SELECTOR PLAN 1
c/w ASA 81  Preop w/u   for TAVR  - struct heart following  may follow up as outpt 4 months        TURBT in am  w urology

## 2023-04-12 NOTE — PROGRESS NOTE ADULT - ASSESSMENT
88M PMH AF, bladder cancer, s/p ORIF presenting from PCP's office for AS evaluation. Patient reports being seen for medical clearance for bladder surgery (planned 4/12) yesterday and was found to have AS, was told to go to ED for evaluation for surgery/TAVR w/u by cardiologist Dr. Mccollum. Per HIE, pt's TTE showed EF 62% with severe AS and severe TR, moderate MR. Patient denies SOB, syncope, dizziness, CP, palpitations, or worsening LE swelling. Pt has chronic LE edema.    CT surgery consulted for surgical evaluation of severe AS, severe TR, moderate MR. All labs and imaging reviewed with on call CT surgeon, Dr. Lang. Plan for structural consult in AM, TAVR CT.     4/7 Admit to CT surgery service, structural consult in AM, TAVR CT  4/8: Stable overnight, Remains afib ? heparin gtt?  4/9 VSS.  Currently therapeutic on heparin gtt.  Pending TAVR CT tomorrow and eventual cardiac cath as part of TAVR w/u.  No CP or SOB   4/10 VSS; afib  on heparin gttp for AC; TAVR CT done this am; if cr stable; cath in am tue with Dr. Pearson  OR date TBD   4/11 cardiac cath  non obs cors disease    Dr Pearson  cardio to clear pt for bladder surgery   hep gtt no longer needed  4/12   npo  for baldder procedure   hep off  afib  vss

## 2023-04-12 NOTE — BRIEF OPERATIVE NOTE - NSICDXBRIEFPROCEDURE_GEN_ALL_CORE_FT
PROCEDURES:  TURBT, with retrograde pyelogram 12-Apr-2023 16:14:40  Dong Billingsley  Cystoscopy with stent placement 12-Apr-2023 16:19:34  Dong Billingsley

## 2023-04-13 PROBLEM — I48.20 CHRONIC ATRIAL FIBRILLATION, UNSPECIFIED: Chronic | Status: ACTIVE | Noted: 2023-04-07

## 2023-04-13 LAB
ANION GAP SERPL CALC-SCNC: 11 MMOL/L — SIGNIFICANT CHANGE UP (ref 5–17)
BUN SERPL-MCNC: 29 MG/DL — HIGH (ref 7–23)
CALCIUM SERPL-MCNC: 10.1 MG/DL — SIGNIFICANT CHANGE UP (ref 8.4–10.5)
CHLORIDE SERPL-SCNC: 104 MMOL/L — SIGNIFICANT CHANGE UP (ref 96–108)
CK SERPL-CCNC: 55 U/L — SIGNIFICANT CHANGE UP (ref 30–200)
CO2 SERPL-SCNC: 25 MMOL/L — SIGNIFICANT CHANGE UP (ref 22–31)
CREAT SERPL-MCNC: 1.08 MG/DL — SIGNIFICANT CHANGE UP (ref 0.5–1.3)
EGFR: 66 ML/MIN/1.73M2 — SIGNIFICANT CHANGE UP
GLUCOSE SERPL-MCNC: 136 MG/DL — HIGH (ref 70–99)
HCT VFR BLD CALC: 43.3 % — SIGNIFICANT CHANGE UP (ref 39–50)
HGB BLD-MCNC: 14.2 G/DL — SIGNIFICANT CHANGE UP (ref 13–17)
LDH SERPL L TO P-CCNC: 265 U/L — HIGH (ref 50–242)
MCHC RBC-ENTMCNC: 31.4 PG — SIGNIFICANT CHANGE UP (ref 27–34)
MCHC RBC-ENTMCNC: 32.8 GM/DL — SIGNIFICANT CHANGE UP (ref 32–36)
MCV RBC AUTO: 95.8 FL — SIGNIFICANT CHANGE UP (ref 80–100)
NRBC # BLD: 0 /100 WBCS — SIGNIFICANT CHANGE UP (ref 0–0)
PLATELET # BLD AUTO: 188 K/UL — SIGNIFICANT CHANGE UP (ref 150–400)
POTASSIUM SERPL-MCNC: 4.9 MMOL/L — SIGNIFICANT CHANGE UP (ref 3.5–5.3)
POTASSIUM SERPL-MCNC: 5.5 MMOL/L — HIGH (ref 3.5–5.3)
POTASSIUM SERPL-MCNC: 5.7 MMOL/L — HIGH (ref 3.5–5.3)
POTASSIUM SERPL-SCNC: 4.9 MMOL/L — SIGNIFICANT CHANGE UP (ref 3.5–5.3)
POTASSIUM SERPL-SCNC: 5.5 MMOL/L — HIGH (ref 3.5–5.3)
POTASSIUM SERPL-SCNC: 5.7 MMOL/L — HIGH (ref 3.5–5.3)
RBC # BLD: 4.52 M/UL — SIGNIFICANT CHANGE UP (ref 4.2–5.8)
RBC # FLD: 13.3 % — SIGNIFICANT CHANGE UP (ref 10.3–14.5)
SODIUM SERPL-SCNC: 140 MMOL/L — SIGNIFICANT CHANGE UP (ref 135–145)
WBC # BLD: 10.26 K/UL — SIGNIFICANT CHANGE UP (ref 3.8–10.5)
WBC # FLD AUTO: 10.26 K/UL — SIGNIFICANT CHANGE UP (ref 3.8–10.5)

## 2023-04-13 PROCEDURE — 99231 SBSQ HOSP IP/OBS SF/LOW 25: CPT

## 2023-04-13 PROCEDURE — 99233 SBSQ HOSP IP/OBS HIGH 50: CPT

## 2023-04-13 PROCEDURE — 99231 SBSQ HOSP IP/OBS SF/LOW 25: CPT | Mod: 25

## 2023-04-13 RX ORDER — TAMSULOSIN HYDROCHLORIDE 0.4 MG/1
0.4 CAPSULE ORAL AT BEDTIME
Refills: 0 | Status: DISCONTINUED | OUTPATIENT
Start: 2023-04-13 | End: 2023-04-14

## 2023-04-13 RX ORDER — ENOXAPARIN SODIUM 100 MG/ML
40 INJECTION SUBCUTANEOUS EVERY 24 HOURS
Refills: 0 | Status: DISCONTINUED | OUTPATIENT
Start: 2023-04-13 | End: 2023-04-14

## 2023-04-13 RX ORDER — SODIUM ZIRCONIUM CYCLOSILICATE 10 G/10G
10 POWDER, FOR SUSPENSION ORAL ONCE
Refills: 0 | Status: COMPLETED | OUTPATIENT
Start: 2023-04-13 | End: 2023-04-13

## 2023-04-13 RX ORDER — FUROSEMIDE 40 MG
20 TABLET ORAL DAILY
Refills: 0 | Status: DISCONTINUED | OUTPATIENT
Start: 2023-04-13 | End: 2023-04-14

## 2023-04-13 RX ADMIN — Medication 100 MILLIGRAM(S): at 05:02

## 2023-04-13 RX ADMIN — Medication 100 MILLIGRAM(S): at 22:23

## 2023-04-13 RX ADMIN — SODIUM CHLORIDE 3 MILLILITER(S): 9 INJECTION INTRAMUSCULAR; INTRAVENOUS; SUBCUTANEOUS at 05:06

## 2023-04-13 RX ADMIN — Medication 2000 UNIT(S): at 11:08

## 2023-04-13 RX ADMIN — SODIUM CHLORIDE 3 MILLILITER(S): 9 INJECTION INTRAMUSCULAR; INTRAVENOUS; SUBCUTANEOUS at 21:59

## 2023-04-13 RX ADMIN — TAMSULOSIN HYDROCHLORIDE 0.4 MILLIGRAM(S): 0.4 CAPSULE ORAL at 22:23

## 2023-04-13 RX ADMIN — SODIUM CHLORIDE 3 MILLILITER(S): 9 INJECTION INTRAMUSCULAR; INTRAVENOUS; SUBCUTANEOUS at 13:33

## 2023-04-13 RX ADMIN — Medication 20 MILLIGRAM(S): at 15:55

## 2023-04-13 RX ADMIN — ENOXAPARIN SODIUM 40 MILLIGRAM(S): 100 INJECTION SUBCUTANEOUS at 11:08

## 2023-04-13 RX ADMIN — SODIUM CHLORIDE 3 MILLILITER(S): 9 INJECTION INTRAMUSCULAR; INTRAVENOUS; SUBCUTANEOUS at 01:08

## 2023-04-13 RX ADMIN — Medication 3 MILLIGRAM(S): at 22:23

## 2023-04-13 RX ADMIN — Medication 500 MILLIGRAM(S): at 11:10

## 2023-04-13 RX ADMIN — SODIUM ZIRCONIUM CYCLOSILICATE 10 GRAM(S): 10 POWDER, FOR SUSPENSION ORAL at 11:07

## 2023-04-13 RX ADMIN — Medication 100 MILLIGRAM(S): at 13:52

## 2023-04-13 RX ADMIN — Medication 81 MILLIGRAM(S): at 11:08

## 2023-04-13 NOTE — PROGRESS NOTE ADULT - ASSESSMENT
88M PMH AF, bladder cancer, s/p ORIF presenting from PCP's office for AS evaluation. Patient reports being seen for medical clearance for bladder surgery (planned 4/12) yesterday and was found to have AS, was told to go to ED for evaluation for surgery/TAVR w/u by cardiologist Dr. Mccollum. Per HIE, pt's TTE showed EF 62% with severe AS and severe TR, moderate MR. Patient denies SOB, syncope, dizziness, CP, palpitations, or worsening LE swelling. Pt has chronic LE edema.    CT surgery consulted for surgical evaluation of severe AS, severe TR, moderate MR. All labs and imaging reviewed with on call CT surgeon, Dr. Lang. Plan for structural consult in AM, TAVR CT.     4/7 Admit to CT surgery service, structural consult in AM, TAVR CT  4/8: Stable overnight, Remains afib ? heparin gtt?  4/9 VSS.  Currently therapeutic on heparin gtt.  Pending TAVR CT tomorrow and eventual cardiac cath as part of TAVR w/u.  No CP or SOB   4/10 VSS; afib  on heparin gttp for AC; TAVR CT done this am; if cr stable; cath in am tue with Dr. Pearson  OR date TBD   4/11 cardiac cath  non obs cors disease    Dr Pearson  cardio to clear pt for bladder surgery   hep gtt no longer needed  4/12   npo  for baldder procedure   hep off  afib  vss 88M PMH AF, bladder cancer, s/p ORIF presenting from PCP's office for AS evaluation. Patient reports being seen for medical clearance for bladder surgery (planned 4/12) yesterday and was found to have AS, was told to go to ED for evaluation for surgery/TAVR w/u by cardiologist Dr. Mccollum. Per HIE, pt's TTE showed EF 62% with severe AS and severe TR, moderate MR. Patient denies SOB, syncope, dizziness, CP, palpitations, or worsening LE swelling. Pt has chronic LE edema.    CT surgery consulted for surgical evaluation of severe AS, severe TR, moderate MR. All labs and imaging reviewed with on call CT surgeon, Dr. Lang. Plan for structural consult in AM, TAVR CT.     4/7 Admit to CT surgery service, structural consult in AM, TAVR CT  4/8: Stable overnight, Remains afib ? heparin gtt?  4/9 VSS.  Currently therapeutic on heparin gtt.  Pending TAVR CT tomorrow and eventual cardiac cath as part of TAVR w/u.  No CP or SOB   4/10 VSS; afib  on heparin gttp for AC; TAVR CT done this am; if cr stable; cath in am tue with Dr. Pearson  OR date TBD   4/11 cardiac cath  non obs cors disease    Dr Pearson  cardio to clear pt for bladder surgery   hep gtt no longer needed  4/12   npo  for baldder procedure   hep off  afib  vss  4/13     CBI   sp   Turbt w stent       potassium 5.7   renal cslt       vss

## 2023-04-13 NOTE — PROGRESS NOTE ADULT - PROBLEM SELECTOR PLAN 1
c/w ASA 81  Preop w/u   for TAVR  - struct heart following  may follow up as outpt 4 months        TURBT in am  w urology c/w ASA 81  Preop w/u   for TAVR  - struct heart following  may follow up as outpt 4 months        sp Turbt w stent w uro   now w CBI to be managed by urolgy  pt will need to go home w charan lainez for dvt prophlaxix  per uro       renal cslt for hyperkalemia

## 2023-04-13 NOTE — PROGRESS NOTE ADULT - ASSESSMENT
A/P: 88y Male s/p TURBT, R stent  -continue off cbi.   -plan to discharge home with farrar with TOV as an outpatient next tuesday 4/18. Discussed w/ patient in detail the need for farrar when going home. Patient initially expressed some concerns with this plan.   -okay to continue ASA, but hold AC for now  -flomax if not otherwise contraindicated for stent colic  -please ensure that foreskin remains over glans, especially when cleaning, etc.

## 2023-04-13 NOTE — PROGRESS NOTE ADULT - SUBJECTIVE AND OBJECTIVE BOX
DAILY PROGRESS NOTE:         24 hr events:  s/p TURBT  cbi clamped in am, clear off irrigation     Objective:    Vital Signs Last 24 Hrs  T(C): 36.3 (13 Apr 2023 04:55), Max: 36.7 (12 Apr 2023 17:50)  T(F): 97.4 (13 Apr 2023 04:55), Max: 98.1 (12 Apr 2023 17:50)  HR: 86 (13 Apr 2023 04:55) (60 - 86)  BP: 148/90 (13 Apr 2023 04:55) (113/66 - 148/90)  BP(mean): 100 (12 Apr 2023 20:54) (84 - 100)  RR: 18 (13 Apr 2023 04:55) (16 - 18)  SpO2: 94% (13 Apr 2023 04:55) (94% - 99%)    Parameters below as of 13 Apr 2023 04:55  Patient On (Oxygen Delivery Method): room air        I&O's Detail    12 Apr 2023 07:01  -  13 Apr 2023 07:00  --------------------------------------------------------  IN:    Continuous Bladder Irrigation (mL): 6000 mL  Total IN: 6000 mL    OUT:    Continuous Bladder Irrigation (mL): 6000 mL    Voided (mL): 94801 mL  Total OUT: 57901 mL    Total NET: -78440 mL      13 Apr 2023 07:01  -  13 Apr 2023 09:22  --------------------------------------------------------  IN:    Oral Fluid: 240 mL  Total IN: 240 mL    OUT:  Total OUT: 0 mL    Total NET: 240 mL          Physical Exam:    General: NAD, well-nourished  Resp: Breathing comfortably on RA  CV: regular rate   : clear pink urine in farrar tubing       Laboratory Results:                          14.2   10.26 )-----------( 188      ( 13 Apr 2023 06:28 )             43.3     04-13    x   |  x   |  x   ----------------------------<  x   5.7<H>   |  x   |  x     Ca    10.1      13 Apr 2023 06:29      PT/INR - ( 11 Apr 2023 17:59 )   PT: 12.9 sec;   INR: 1.11 ratio         PTT - ( 11 Apr 2023 17:59 )  PTT:31.5 sec

## 2023-04-13 NOTE — PROGRESS NOTE ADULT - NS ATTEND AMEND GEN_ALL_CORE FT
No acute events reported overnight.  The patient is frustrated due to penile pain.  Denies any chest pain/tightness discomfort, shortness of breath, fevers, chills, sweats, sensation, dizzy or palpitations.  Agree with 14 point review of systems and physical examination as noted above.    Assessment/plan  Aortic Stenosis  --Mr. Hammonds is an 88-year-old gentleman with a past medical history significant for atrial fibrillation, bladder cancer and ORIF.  The patient on 4/12/2023 underwent TURBT.  The patient CBI was clamped earlier today.  --Continue aspirin 81 mg daily.  --The patients imaging studies were reviewed with structural echocardiography team/Dr. Gonzales.  Based upon echocardiogram imaging and and aortic valve calcium score (Agatston and calcium score 1278) the patient has moderate–severe aortic valve stenosis.  At this time it is recommended that he get follow-up in the valve clinic in approximately 3 to 4 months at which time a repeat TTE would be performed.  --Cardiac catheterization was performed earlier today that demonstrated two-vessel coronary artery disease (diagonal and obtuse marginal).  Recommend medical management of coronary artery disease.  --Carotid ultrasound on 4/7/2023 demonstrate no significant hemodynamic stenosis of either carotid artery.  --Elevated CHADSVASC score 2.  The patient has a possible filling defect in the left atrial appendage on delayed phase imaging.  Recommended the patient be restarted back on a anticoagulation therapy once cleared by urology team.  May consider ALEJANDRO as an outpatient if clinically indicated.  --Venous duplex study on 4/7/2023 demonstrate no evidence of lower extremity DVT.  --Continue telemetry.  --EKG demonstrates atrial fibrillation, left axis deviation and right bundle branch block ( ms/QTc 439).  --Aim for potassium greater than 4 magnesium greater than 2.  --Out of bed to chair as tolerated.  The patient uses a walker at baseline.    All questions and concerns of the patient were addressed.    Findings and plan were discussed with urology team/Dr. Billingsley.  Recommend patient get close follow-up with outpatient cardiology/Dr. Mccollum.    All questions and concerns of the patient were discussed.    Time-based billing (NON-critical care).     35 minutes spent on total encounter. The necessity of the time spent during the encounter on this date of service was due to:     education, assessment and coordination of care.

## 2023-04-13 NOTE — PROGRESS NOTE ADULT - SUBJECTIVE AND OBJECTIVE BOX
VITAL SIGNS    Telemetry:      Vital Signs Last 24 Hrs  T(C): 36.3 (04-13-23 @ 04:55), Max: 36.7 (04-12-23 @ 17:50)  T(F): 97.4 (04-13-23 @ 04:55), Max: 98.1 (04-12-23 @ 17:50)  HR: 86 (04-13-23 @ 04:55) (60 - 86)  BP: 148/90 (04-13-23 @ 04:55) (113/66 - 148/90)  RR: 18 (04-13-23 @ 04:55) (16 - 18)  SpO2: 94% (04-13-23 @ 04:55) (94% - 99%)                   Daily Height in cm: 190.5 (12 Apr 2023 11:00)    Daily         CAPILLARY BLOOD GLUCOSE              Drains:     MS         [  ] Drainage:                 L Pleural  [  ]  Drainage:                R Pleural  [                       PHYSICAL EXAM    Neurology: alert and oriented x 3, moves all extremities with no defecits  CV :  RRR  Sternal Wound :  CDI , Stable  Lungs:   CTA B/L  Abdomen: soft, nontender, nondistended, positive bowel sounds, last bowel movement   Extremities:                                            VITAL SIGNS    Telemetry:        afib  70  Vital Signs Last 24 Hrs  T(C): 36.3 (04-13-23 @ 04:55), Max: 36.7 (04-12-23 @ 17:50)  T(F): 97.4 (04-13-23 @ 04:55), Max: 98.1 (04-12-23 @ 17:50)  HR: 86 (04-13-23 @ 04:55) (60 - 86)  BP: 148/90 (04-13-23 @ 04:55) (113/66 - 148/90)  RR: 18 (04-13-23 @ 04:55) (16 - 18)  SpO2: 94% (04-13-23 @ 04:55) (94% - 99%)                   Daily Height in cm: 190.5 (12 Apr 2023 11:00)    Daily         CAPILLARY BLOOD GLUCOSE          farrar   CBI                    PHYSICAL EXAM    Neurology: alert and oriented x 3, moves all extremities with no defecits  CV :  RRR  Lungs:   CTA B/L  Abdomen: soft, nontender, nondistended, positive bowel sounds, last bowel movement   Extremities:     plus one pedal edema   + farrar  CBI                                       VITAL SIGNS    Telemetry:        afib  70  Vital Signs Last 24 Hrs  T(C): 36.3 (04-13-23 @ 04:55), Max: 36.7 (04-12-23 @ 17:50)  T(F): 97.4 (04-13-23 @ 04:55), Max: 98.1 (04-12-23 @ 17:50)  HR: 86 (04-13-23 @ 04:55) (60 - 86)  BP: 148/90 (04-13-23 @ 04:55) (113/66 - 148/90)  RR: 18 (04-13-23 @ 04:55) (16 - 18)  SpO2: 94% (04-13-23 @ 04:55) (94% - 99%)                   Daily Height in cm: 190.5 (12 Apr 2023 11:00)    Daily         CAPILLARY BLOOD GLUCOSE          farrar   CBI                    PHYSICAL EXAM  s'    No sob    No  CP"  Neurology: alert and oriented x 3, moves all extremities with no defecits  CV :  RRR  Lungs:   CTA B/L  Abdomen: soft, nontender, nondistended, positive bowel sounds, last bowel movement   Extremities:     plus one pedal edema   + farrar  CBI

## 2023-04-13 NOTE — CONSULT NOTE ADULT - NS ATTEND AMEND GEN_ALL_CORE FT
Seen, examined with, formulated plan with and  agree with above as scribed by NP Antionette [Mirtha]     lungs no rales   heart RRR II/VI M   abd soft non tender  ext 1+ + edema    severe AS  s/p TURBT and hematuria   hyperkalemia  edema       etiology of hyperkalemia is unclear ? type IV RTA but serum bicarb is not low   lokelma 10 g given still with hyperkalemia   lasix 20 mg po x 1   CBI for hematuria   d/w CTS team

## 2023-04-13 NOTE — CONSULT NOTE ADULT - SUBJECTIVE AND OBJECTIVE BOX
[facility]  [phone]  NEPHROLOGY      INITIAL CONSULT NOTE  --------------------------------------------------------------------------------  HPI:    88 year old Male PMH AF, bladder cancer, s/p ORIF presenting from PCP's office for AS evaluation. Patient reports being seen for medical clearance work up for bladder surgery (planned 4/12) and was found to have severe AS, severe TR, moderate MR. Per HIBING, pt's TTE showed EF 62% with severe AS and severe TR, moderate MR. CT surgery and structural heart team following. TAVR work up completed, s/p CT coronaries 4/10 and cardiac angiogram 4/11. And is now s/p TURBT 4/12, and now on CBI. Found to be hyperkalemic this am. Renal consult called.      PAST HISTORY  --------------------------------------------------------------------------------  PAST MEDICAL & SURGICAL HISTORY:  Chronic atrial fibrillation      Bladder cancer      S/P hip replacement, right        FAMILY HISTORY:  denies hx of kidney disease    PAST SOCIAL HISTORY:  denies tobacco use.     ALLERGIES & MEDICATIONS  --------------------------------------------------------------------------------  Allergies    No Known Allergies    Intolerances      Standing Inpatient Medications  ascorbic acid 500 milliGRAM(s) Oral daily  aspirin enteric coated 81 milliGRAM(s) Oral daily  ceFAZolin   IVPB 1000 milliGRAM(s) IV Intermittent every 8 hours  cholecalciferol 2000 Unit(s) Oral daily  enoxaparin Injectable 40 milliGRAM(s) SubCutaneous every 24 hours  melatonin 3 milliGRAM(s) Oral at bedtime  sodium chloride 0.9% lock flush 3 milliLiter(s) IV Push every 8 hours  sodium zirconium cyclosilicate 10 Gram(s) Oral once  tamsulosin 0.4 milliGRAM(s) Oral at bedtime    PRN Inpatient Medications      REVIEW OF SYSTEMS  --------------------------------------------------------------------------------  Gen: No fevers/chills   Head/Eyes/Ears/Mouth: No headache; Normal hearing;  Respiratory: No dyspnea, cough, wheezing  CV: No chest pain, orthopnea  GI: No abdominal pain, diarrhea, nausea, vomiting  : No dysuria,   MSK: No joint pain/swelling; no back pain  Neuro: No dizziness/lightheadedness, weakness,   also with no edema     VITALS/PHYSICAL EXAM  --------------------------------------------------------------------------------  T(C): 36.3 (04-13-23 @ 04:55), Max: 36.7 (04-12-23 @ 17:50)  HR: 86 (04-13-23 @ 04:55) (60 - 86)  BP: 148/90 (04-13-23 @ 04:55) (113/66 - 148/90)  RR: 18 (04-13-23 @ 04:55) (16 - 18)  SpO2: 94% (04-13-23 @ 04:55) (94% - 99%)  Wt(kg): --  Height (cm): 190.5 (04-12-23 @ 11:00)  Weight (kg): 107.5 (04-12-23 @ 11:00)  BMI (kg/m2): 29.6 (04-12-23 @ 11:00)  BSA (m2): 2.36 (04-12-23 @ 11:00)      04-12-23 @ 07:01  -  04-13-23 @ 07:00  --------------------------------------------------------  IN: 6000 mL / OUT: 47836 mL / NET: -70758 mL    04-13-23 @ 07:01  -  04-13-23 @ 09:55  --------------------------------------------------------  IN: 240 mL / OUT: 0 mL / NET: 240 mL      Physical Exam:  	Gen: elderly male, pale, appears comfortable on RA   	Pulm: decrease bs  no rales or ronchi or wheezing  	CV: Mild JVD. RRR, S1S2; no rub, +murmur II/VI  	Back: no sacral edema  	Abd: +BS, soft, nontender/nondistended  	: No suprapubic distention, CBI in progress  	UE: Warm, no cyanosis  no clubbing,  no edema;   	LE: Warm, no cyanosis  no clubbing,   	Neuro: alert and oriented.   	Skin: Warm, no decrease skin turgor, excoriated skin on chest     LABS/STUDIES  --------------------------------------------------------------------------------              14.2   10.26 >-----------<  188      [04-13-23 @ 06:28]              43.3     140 |  x   |  29  ----------------------------<  x       [04-13-23 @ 07:55]  5.7   |  x   | 1.08        Ca     10.1     [04-13-23 @ 06:29]      PT/INR: PT 12.9 , INR 1.11       [04-11-23 @ 17:59]  PTT: 31.5       [04-11-23 @ 17:59]      Creatinine Trend:  SCr 1.08 [04-13 @ 06:29]  SCr 1.13 [04-12 @ 16:23]  SCr 1.49 [04-12 @ 05:36]  SCr 1.20 [04-11 @ 06:01]  SCr 1.23 [04-10 @ 06:25]    Urinalysis - [04-08-23 @ 06:12]      Color Light Yellow / Appearance Clear / SG 1.009 / pH 6.5      Gluc Negative / Ketone Negative  / Bili Negative / Urobili Negative       Blood Moderate / Protein Trace / Leuk Est Negative / Nitrite Negative      RBC 10 / WBC 3 / Hyaline 0 / Gran  / Sq Epi  / Non Sq Epi  / Bacteria Negative      TSH 1.92      [04-07-23 @ 05:42]

## 2023-04-13 NOTE — PROGRESS NOTE ADULT - SUBJECTIVE AND OBJECTIVE BOX
*****Structural Heart Team*****    Subjective:    Patient is resting in bed, offering no cardiac related complaints. He had CBI going after he had a debulking of a urinary bladder tumor yesterday. He still has hematuria.      PAST MEDICAL & SURGICAL HISTORY:  Chronic atrial fibrillation    Bladder cancer    S/P hip replacement, right          T(C): 36.3 (04-13-23 @ 04:55), Max: 36.7 (04-12-23 @ 17:50)  HR: 86 (04-13-23 @ 04:55) (60 - 86)  BP: 148/90 (04-13-23 @ 04:55) (113/66 - 148/90)  RR: 18 (04-13-23 @ 04:55) (16 - 18)  SpO2: 94% (04-13-23 @ 04:55) (94% - 99%)  Wt(kg): --  04-12 @ 07:01  -  04-13 @ 07:00  --------------------------------------------------------  IN: 6000 mL / OUT: 28570 mL / NET: -64089 mL    04-13 @ 07:01  -  04-13 @ 10:36  --------------------------------------------------------  IN: 2240 mL / OUT: 2600 mL / NET: -360 mL      MEDICATIONS  (STANDING):  ascorbic acid 500 milliGRAM(s) Oral daily  aspirin enteric coated 81 milliGRAM(s) Oral daily  ceFAZolin   IVPB 1000 milliGRAM(s) IV Intermittent every 8 hours  cholecalciferol 2000 Unit(s) Oral daily  enoxaparin Injectable 40 milliGRAM(s) SubCutaneous every 24 hours  melatonin 3 milliGRAM(s) Oral at bedtime  sodium zirconium cyclosilicate 10 Gram(s) Oral once  tamsulosin 0.4 milliGRAM(s) Oral at bedtime    MEDICATIONS  (PRN):      Review of Symptoms:  Constitutional: Awake, Alert, Follows commands  Respiratory: Currently Denies  Cardiac: Denies CP, Denies Palpitations  Gastrointestinal: Denies Pain, Denies N/V, tolerating po intake  Vascular: Negative  Extremities: + Edema, No joint pain or swelling  Neurological: Negative  Endocrine: No heat or cold intolerance, No excessive thirst  Heme/Onc: S/P debulking of bladder tumor    Exam:  General: A/Ox3, EDUARDO, NAD  HEENT: Supple, No JVD, Trachea midline, no masses  Pulmonary: CTAB, = Chest Excursion, no accessory muscle use  Cor: S1S2, Irregular, II/VI ALIE  ECG: AFib  Gastrointestinal: Soft, NT/ND, + Bowel Sounds  Neuro: = motor and sensory B/L, No focal deficits  Vascular: 1+ pulses B/L, No edema  Extremities: No joint pain or swelling  Urological: + CBI with hematuria  Skin: Warm/Dry/Normal color, Normal turgor, no rashes                          14.2   10.26 )-----------( 188      ( 13 Apr 2023 06:28 )             43.3   04-13    x   |  x   |  x   ----------------------------<  x   5.7<H>   |  x   |  x     Ca    10.1      13 Apr 2023 06:29    PT/INR - ( 11 Apr 2023 17:59 )   PT: 12.9 sec;   INR: 1.11 ratio         PTT - ( 11 Apr 2023 17:59 )  PTT:31.5 sec    Imaging Reviewed:            Assesment/Plan:  87 y/o male with Severe Aortic Stenosis, S/P Bladder tumor Debulking, Chronic AFib  1.) Aortic Stenosis: Patient has had his workup for TAVR completed. His TTE shows moderate to severe Aortic Stenosis, and he was complaining of no cardiac related symptoms. In light of his on going urological issues, and his Urologists concern for utilization of Anticoagulation, we will bring him back to clinic in 2-3 months post discharge to follow up on how he is doing. I explained this to the patient, and he understood.  2.) Chronic AFib: Patient is currently rate controlled, but is off of anticoagulation due to hematuria after his bladder procedure. Will continue to discuss with urology about when it can be safely restarted.  3.) Bladder Cancer:  CBI management as per Urology. Patient likely to go home with Rebollar catheter.  4.) Ambulate as tolerated    JIMENA Storey  [id number]

## 2023-04-13 NOTE — CONSULT NOTE ADULT - ASSESSMENT
88 year old Male PMH AF, bladder cancer, s/p ORIF presenting from PCP's office for AS evaluation. Patient reports being seen for medical clearance work up for bladder surgery (planned 4/12) and was found to have severe AS, severe TR, moderate MR. Per HIE, pt's TTE showed EF 62% with severe AS and severe TR, moderate MR. CT surgery and structural heart team following. TAVR work up completed, s/p CT coronaries 4/10 and cardiac angiogram 4/11. And is now s/p TURBT 4/12, and now on CBI.      1- hyperkalemia  2- severe AS  3- bladder CA      hyperkalemia, unclear etiology  check LDH, and CPK  lokelma 10G po x 1   repeat k in 4 hours  low k diet  d/w ct surgery

## 2023-04-13 NOTE — PROGRESS NOTE ADULT - PROBLEM SELECTOR PLAN 2
Chronic afib   hep gtt now off  will need to dw   urolgy whrn AC can resume as outpt Chronic afib   hep gtt now off  will need to dw   urolgy whrn AC   for afib  can resume

## 2023-04-13 NOTE — PROGRESS NOTE ADULT - SUBJECTIVE AND OBJECTIVE BOX
Post op Check    Patient seen and examined @ 9:00p. Delay in documentation due to clinical duties.      Pt seen and examined at bedside. Denies SOB/CP/N/V. Patient complaining of penis pain. Found to be paraphimotic on exam.    Vital Signs Last 24 Hrs  T(C): 36.3 (12 Apr 2023 20:54), Max: 36.7 (12 Apr 2023 04:57)  T(F): 97.4 (12 Apr 2023 20:54), Max: 98.1 (12 Apr 2023 17:50)  HR: 71 (12 Apr 2023 20:54) (60 - 83)  BP: 126/86 (12 Apr 2023 20:54) (113/66 - 144/68)  BP(mean): 100 (12 Apr 2023 20:54) (84 - 100)  RR: 18 (12 Apr 2023 20:54) (16 - 18)  SpO2: 95% (12 Apr 2023 20:54) (94% - 99%)    Parameters below as of 12 Apr 2023 20:54  Patient On (Oxygen Delivery Method): room air        I&O's Summary    11 Apr 2023 07:01  -  12 Apr 2023 07:00  --------------------------------------------------------  IN: 240 mL / OUT: 500 mL / NET: -260 mL    12 Apr 2023 07:01  -  13 Apr 2023 00:45  --------------------------------------------------------  IN: 0 mL / OUT: 44983 mL / NET: -56282 mL        Physical Exam  Gen: NAD, A&Ox3  Pulm: No respiratory distress, no subcostal retractions  CV: RRR, no JVD  Abd: Soft, NT, ND  : head of penis beginning to get dusky; found to be paraphimotic; manually reduced, with improvement in pain. urine punch on slow drip cbi, rate increase to moderate/fast drip, with improvement to very light peach                          14.0   5.48  )-----------( 167      ( 12 Apr 2023 16:23 )             42.8       04-12    138  |  107  |  33<H>  ----------------------------<  140<H>  4.4   |  20<L>  |  1.13    Ca    9.2      12 Apr 2023 16:23

## 2023-04-13 NOTE — PROGRESS NOTE ADULT - ASSESSMENT
A/P: 88y Male s/p TURBT, R stent  -wean/clamp CBI in AM  -plan to discharge home with charan with TOJL as an outpatient next tuesday 4/18  -okay to continue ASA, but hold AC for now  -flomax if not otherwise contraindicated for stent colic  -please ensure that foreskin remains over glans, especially when cleaning, etc.

## 2023-04-14 ENCOUNTER — TRANSCRIPTION ENCOUNTER (OUTPATIENT)
Age: 88
End: 2023-04-14

## 2023-04-14 VITALS
RESPIRATION RATE: 18 BRPM | OXYGEN SATURATION: 98 % | DIASTOLIC BLOOD PRESSURE: 71 MMHG | HEART RATE: 79 BPM | SYSTOLIC BLOOD PRESSURE: 107 MMHG | TEMPERATURE: 98 F

## 2023-04-14 LAB
ANION GAP SERPL CALC-SCNC: 11 MMOL/L — SIGNIFICANT CHANGE UP (ref 5–17)
APTT BLD: 27.6 SEC — SIGNIFICANT CHANGE UP (ref 27.5–35.5)
BUN SERPL-MCNC: 30 MG/DL — HIGH (ref 7–23)
CALCIUM SERPL-MCNC: 9.3 MG/DL — SIGNIFICANT CHANGE UP (ref 8.4–10.5)
CHLORIDE SERPL-SCNC: 104 MMOL/L — SIGNIFICANT CHANGE UP (ref 96–108)
CO2 SERPL-SCNC: 22 MMOL/L — SIGNIFICANT CHANGE UP (ref 22–31)
CREAT SERPL-MCNC: 1.37 MG/DL — HIGH (ref 0.5–1.3)
EGFR: 50 ML/MIN/1.73M2 — LOW
GLUCOSE SERPL-MCNC: 95 MG/DL — SIGNIFICANT CHANGE UP (ref 70–99)
HCT VFR BLD CALC: 42.5 % — SIGNIFICANT CHANGE UP (ref 39–50)
HGB BLD-MCNC: 13.6 G/DL — SIGNIFICANT CHANGE UP (ref 13–17)
HGB BLDA-MCNC: 14.9 G/DL — SIGNIFICANT CHANGE UP (ref 12.6–17.4)
INR BLD: 1.08 RATIO — SIGNIFICANT CHANGE UP (ref 0.88–1.16)
MCHC RBC-ENTMCNC: 31.3 PG — SIGNIFICANT CHANGE UP (ref 27–34)
MCHC RBC-ENTMCNC: 32 GM/DL — SIGNIFICANT CHANGE UP (ref 32–36)
MCV RBC AUTO: 97.9 FL — SIGNIFICANT CHANGE UP (ref 80–100)
NRBC # BLD: 0 /100 WBCS — SIGNIFICANT CHANGE UP (ref 0–0)
OXYHGB MFR BLDA: 94.9 % — SIGNIFICANT CHANGE UP (ref 90–95)
PLATELET # BLD AUTO: 166 K/UL — SIGNIFICANT CHANGE UP (ref 150–400)
POTASSIUM SERPL-MCNC: 4.5 MMOL/L — SIGNIFICANT CHANGE UP (ref 3.5–5.3)
POTASSIUM SERPL-SCNC: 4.5 MMOL/L — SIGNIFICANT CHANGE UP (ref 3.5–5.3)
PROTHROM AB SERPL-ACNC: 12.5 SEC — SIGNIFICANT CHANGE UP (ref 10.5–13.4)
RBC # BLD: 4.34 M/UL — SIGNIFICANT CHANGE UP (ref 4.2–5.8)
RBC # FLD: 13.5 % — SIGNIFICANT CHANGE UP (ref 10.3–14.5)
SAO2 % BLDA: 97 % — SIGNIFICANT CHANGE UP (ref 94–98)
SODIUM SERPL-SCNC: 137 MMOL/L — SIGNIFICANT CHANGE UP (ref 135–145)
WBC # BLD: 8.86 K/UL — SIGNIFICANT CHANGE UP (ref 3.8–10.5)
WBC # FLD AUTO: 8.86 K/UL — SIGNIFICANT CHANGE UP (ref 3.8–10.5)

## 2023-04-14 PROCEDURE — U0005: CPT

## 2023-04-14 PROCEDURE — C1894: CPT

## 2023-04-14 PROCEDURE — 93306 TTE W/DOPPLER COMPLETE: CPT

## 2023-04-14 PROCEDURE — 85025 COMPLETE CBC W/AUTO DIFF WBC: CPT

## 2023-04-14 PROCEDURE — 86850 RBC ANTIBODY SCREEN: CPT

## 2023-04-14 PROCEDURE — 83615 LACTATE (LD) (LDH) ENZYME: CPT

## 2023-04-14 PROCEDURE — 84443 ASSAY THYROID STIM HORMONE: CPT

## 2023-04-14 PROCEDURE — U0003: CPT

## 2023-04-14 PROCEDURE — 97161 PT EVAL LOW COMPLEX 20 MIN: CPT

## 2023-04-14 PROCEDURE — 84100 ASSAY OF PHOSPHORUS: CPT

## 2023-04-14 PROCEDURE — C1889: CPT

## 2023-04-14 PROCEDURE — 93456 R HRT CORONARY ARTERY ANGIO: CPT

## 2023-04-14 PROCEDURE — 83880 ASSAY OF NATRIURETIC PEPTIDE: CPT

## 2023-04-14 PROCEDURE — 97116 GAIT TRAINING THERAPY: CPT

## 2023-04-14 PROCEDURE — C9399: CPT

## 2023-04-14 PROCEDURE — 75574 CT ANGIO HRT W/3D IMAGE: CPT

## 2023-04-14 PROCEDURE — C1758: CPT

## 2023-04-14 PROCEDURE — 85384 FIBRINOGEN ACTIVITY: CPT

## 2023-04-14 PROCEDURE — 80053 COMPREHEN METABOLIC PANEL: CPT

## 2023-04-14 PROCEDURE — 93880 EXTRACRANIAL BILAT STUDY: CPT

## 2023-04-14 PROCEDURE — 97112 NEUROMUSCULAR REEDUCATION: CPT

## 2023-04-14 PROCEDURE — 93005 ELECTROCARDIOGRAM TRACING: CPT

## 2023-04-14 PROCEDURE — 74174 CTA ABD&PLVS W/CONTRAST: CPT

## 2023-04-14 PROCEDURE — 36415 COLL VENOUS BLD VENIPUNCTURE: CPT

## 2023-04-14 PROCEDURE — 83735 ASSAY OF MAGNESIUM: CPT

## 2023-04-14 PROCEDURE — 87641 MR-STAPH DNA AMP PROBE: CPT

## 2023-04-14 PROCEDURE — 85730 THROMBOPLASTIN TIME PARTIAL: CPT

## 2023-04-14 PROCEDURE — 83036 HEMOGLOBIN GLYCOSYLATED A1C: CPT

## 2023-04-14 PROCEDURE — 0225U NFCT DS DNA&RNA 21 SARSCOV2: CPT

## 2023-04-14 PROCEDURE — 82803 BLOOD GASES ANY COMBINATION: CPT

## 2023-04-14 PROCEDURE — C1769: CPT

## 2023-04-14 PROCEDURE — 99285 EMERGENCY DEPT VISIT HI MDM: CPT

## 2023-04-14 PROCEDURE — 99231 SBSQ HOSP IP/OBS SF/LOW 25: CPT

## 2023-04-14 PROCEDURE — C2617: CPT

## 2023-04-14 PROCEDURE — 84132 ASSAY OF SERUM POTASSIUM: CPT

## 2023-04-14 PROCEDURE — 85610 PROTHROMBIN TIME: CPT

## 2023-04-14 PROCEDURE — 85027 COMPLETE CBC AUTOMATED: CPT

## 2023-04-14 PROCEDURE — 80048 BASIC METABOLIC PNL TOTAL CA: CPT

## 2023-04-14 PROCEDURE — 82962 GLUCOSE BLOOD TEST: CPT

## 2023-04-14 PROCEDURE — 71275 CT ANGIOGRAPHY CHEST: CPT

## 2023-04-14 PROCEDURE — 82550 ASSAY OF CK (CPK): CPT

## 2023-04-14 PROCEDURE — 94010 BREATHING CAPACITY TEST: CPT

## 2023-04-14 PROCEDURE — 71045 X-RAY EXAM CHEST 1 VIEW: CPT

## 2023-04-14 PROCEDURE — 87640 STAPH A DNA AMP PROBE: CPT

## 2023-04-14 PROCEDURE — 86901 BLOOD TYPING SEROLOGIC RH(D): CPT

## 2023-04-14 PROCEDURE — 84439 ASSAY OF FREE THYROXINE: CPT

## 2023-04-14 PROCEDURE — 93971 EXTREMITY STUDY: CPT

## 2023-04-14 PROCEDURE — 88307 TISSUE EXAM BY PATHOLOGIST: CPT

## 2023-04-14 PROCEDURE — 86900 BLOOD TYPING SEROLOGIC ABO: CPT

## 2023-04-14 PROCEDURE — 81001 URINALYSIS AUTO W/SCOPE: CPT

## 2023-04-14 PROCEDURE — 76000 FLUOROSCOPY <1 HR PHYS/QHP: CPT

## 2023-04-14 RX ORDER — TAMSULOSIN HYDROCHLORIDE 0.4 MG/1
1 CAPSULE ORAL
Qty: 30 | Refills: 0
Start: 2023-04-14 | End: 2023-05-13

## 2023-04-14 RX ORDER — FUROSEMIDE 40 MG
1 TABLET ORAL
Qty: 30 | Refills: 0
Start: 2023-04-14 | End: 2023-05-13

## 2023-04-14 RX ORDER — ASPIRIN/CALCIUM CARB/MAGNESIUM 324 MG
0 TABLET ORAL
Qty: 0 | Refills: 0 | DISCHARGE

## 2023-04-14 RX ORDER — ASPIRIN/CALCIUM CARB/MAGNESIUM 324 MG
1 TABLET ORAL
Qty: 30 | Refills: 0
Start: 2023-04-14 | End: 2023-05-13

## 2023-04-14 RX ADMIN — ENOXAPARIN SODIUM 40 MILLIGRAM(S): 100 INJECTION SUBCUTANEOUS at 11:30

## 2023-04-14 RX ADMIN — Medication 500 MILLIGRAM(S): at 11:33

## 2023-04-14 RX ADMIN — Medication 100 MILLIGRAM(S): at 13:30

## 2023-04-14 RX ADMIN — SODIUM CHLORIDE 3 MILLILITER(S): 9 INJECTION INTRAMUSCULAR; INTRAVENOUS; SUBCUTANEOUS at 05:53

## 2023-04-14 RX ADMIN — SODIUM CHLORIDE 3 MILLILITER(S): 9 INJECTION INTRAMUSCULAR; INTRAVENOUS; SUBCUTANEOUS at 13:06

## 2023-04-14 RX ADMIN — Medication 2000 UNIT(S): at 11:32

## 2023-04-14 RX ADMIN — Medication 20 MILLIGRAM(S): at 05:30

## 2023-04-14 RX ADMIN — Medication 100 MILLIGRAM(S): at 05:30

## 2023-04-14 RX ADMIN — Medication 81 MILLIGRAM(S): at 11:30

## 2023-04-14 NOTE — DISCHARGE NOTE PROVIDER - NSDCFUADDAPPT_GEN_ALL_CORE_FT
Make a follow up appt with DR. Mccollum, Internist/Cardiologist in 1-2 weeks  Follow up with urology in 1-2 weeks Make a follow up appt with DR. Mccollum, Internist/Cardiologist in 1-2 weeks  Follow up with urologist, DR. Dong Billingsley on April 18th

## 2023-04-14 NOTE — DISCHARGE NOTE PROVIDER - NSDCPNSUBOBJ_GEN_ALL_CORE
PHYSICAL EXAM  Neurology: alert and oriented x 3, moves all extremities with no defecits  CV :  S1S2  Lungs:   CTA B/L  Abdomen: soft, nontender, nondistended, positive bowel sounds +bm  Extremities:  +edema, no calf tenderness

## 2023-04-14 NOTE — DISCHARGE NOTE PROVIDER - CARE PROVIDER_API CALL
Sánchez Pearson)  Cardiology; Internal Medicine; Interventional Cardiology  [address] [place] [address]  Phone: [phone]  Fax: [phone]  Follow Up Time:    Sánchez Pearson)  Cardiology; Internal Medicine; Interventional Cardiology  [address] [place] [address]  Phone: [phone]  Fax: [phone]  Follow Up Time:     Vineet Mccollum)  Cardiac Electrophysiology; Cardiovascular Disease; Internal Medicine  [address] [place] [address]  Phone: [phone]  Fax: [phone]  Follow Up Time:

## 2023-04-14 NOTE — PROGRESS NOTE ADULT - PROVIDER SPECIALTY LIST ADULT
CT Surgery
Urology
Structural Heart
Urology
CT Surgery
Nephrology
Urology
CT Surgery

## 2023-04-14 NOTE — PROGRESS NOTE ADULT - ASSESSMENT
88M PMH AF, bladder cancer, s/p ORIF presenting from PCP's office for AS evaluation. Patient reports being seen for medical clearance for bladder surgery (planned 4/12) yesterday and was found to have AS, was told to go to ED for evaluation for surgery/TAVR w/u by cardiologist Dr. Mccollum. Per HIE, pt's TTE showed EF 62% with severe AS and severe TR, moderate MR. Patient denies SOB, syncope, dizziness, CP, palpitations, or worsening LE swelling. Pt has chronic LE edema.    CT surgery consulted for surgical evaluation of severe AS, severe TR, moderate MR. All labs and imaging reviewed with on call CT surgeon, Dr. Lang. Plan for structural consult in AM, TAVR CT.     4/7 Admit to CT surgery service, structural consult in AM, TAVR CT  4/8: Stable overnight, Remains afib ? heparin gtt?  4/9 VSS.  Currently therapeutic on heparin gtt.  Pending TAVR CT tomorrow and eventual cardiac cath as part of TAVR w/u.  No CP or SOB   4/10 VSS; afib  on heparin gttp for AC; TAVR CT done this am; if cr stable; cath in am tue with Dr. Pearson  OR date TBD   4/11 cardiac cath  non obs cors disease    Dr Pearson  cardio to clear pt for bladder surgery   hep gtt no longer needed  4/12   npo  for baldder procedure   hep off  afib  vss  4/13     CBI   sp   Turbt w stent       potassium 5.7   renal cslt       vss 88M PMH AF, bladder cancer, s/p ORIF presenting from PCP's office for AS evaluation. Patient reports being seen for medical clearance for bladder surgery (planned 4/12) yesterday and was found to have AS, was told to go to ED for evaluation for surgery/TAVR w/u by cardiologist Dr. Mccollum. Per HIE, pt's TTE showed EF 62% with severe AS and severe TR, moderate MR. Patient denies SOB, syncope, dizziness, CP, palpitations, or worsening LE swelling. Pt has chronic LE edema.    CT surgery consulted for surgical evaluation of severe AS, severe TR, moderate MR. All labs and imaging reviewed with on call CT surgeon, Dr. Lang. Plan for structural consult in AM, TAVR CT.     4/7 Admit to CT surgery service, structural consult in AM, TAVR CT  4/8: Stable overnight, Remains afib ? heparin gtt?  4/9 VSS.  Currently therapeutic on heparin gtt.  Pending TAVR CT tomorrow and eventual cardiac cath as part of TAVR w/u.  No CP or SOB   4/10 VSS; afib  on heparin gttp for AC; TAVR CT done this am; if cr stable; cath in am tue with Dr. Pearson  OR date TBD   4/11 cardiac cath  non obs cors disease    Dr Pearson  cardio to clear pt for bladder surgery   hep gtt no longer needed  4/12   npo  for baldder procedure   hep off  afib  vss  4/13     CBI   sp   Turbt w stent       potassium 5.7   renal cslt       vss  4/14     cbi clamped for  per uro   watch color of output,   lasix  pedal edema

## 2023-04-14 NOTE — PROGRESS NOTE ADULT - ASSESSMENT
A/P: 88y Male s/p TURBT, R stent  -continue off cbi. please discontinue cbi tubing and keep farrar.  -plan to discharge home with farrar with TOV as an outpatient next tuesday 4/18. Discussed w/ patient in detail the need for farrar when going home. Patient initially expressed some concerns with this plan.   -okay to continue ASA, but hold AC for now  -flomax if not otherwise contraindicated for stent colic  -please ensure that foreskin remains over glans, especially when cleaning, etc.    No further intervention per urology while inpatient. Urology to signoff at this time, please reconsult as needed. Clear for dispo home per   Available via Teams (Denzel Malcolm) from 6a-6p, M-F. Please page [phone] for issues that arise on nights and weekends

## 2023-04-14 NOTE — PHARMACOTHERAPY INTERVENTION NOTE - COMMENTS
Counseled patient on the following inpatient medications names (brand/generic), indication, and possible side effects:    MEDICATIONS  (STANDING):  ascorbic acid 500 milliGRAM(s) Oral daily  aspirin enteric coated 81 milliGRAM(s) Oral daily  ceFAZolin IVPB 1000 milliGRAM(s) IV Intermittent every 8 hours  cholecalciferol 2000 Unit(s) Oral daily  enoxaparin Injectable 40 milliGRAM(s) SubCutaneous every 24 hours  furosemide Tablet 20 milliGRAM(s) Oral daily  tamsulosin 0.4 milliGRAM(s) Oral at bedtime    Provided medication guide. Patient questions and concerns were answered and addressed. Patient demonstrated understanding.    Belinda Tovar, PharmD  PGY1 Pharmacy Resident  Available on June [employer] [id number]

## 2023-04-14 NOTE — DISCHARGE NOTE PROVIDER - PROVIDER TOKENS
PROVIDER:[TOKEN:[[id number]]] PROVIDER:[TOKEN:[[id number]]],PROVIDER:[TOKEN:[[id number]]]

## 2023-04-14 NOTE — PROGRESS NOTE ADULT - ASSESSMENT
88 year old Male PMH AF, bladder cancer, s/p ORIF presenting from PCP's office for AS evaluation. Patient reports being seen for medical clearance work up for bladder surgery (planned 4/12) and was found to have severe AS, severe TR, moderate MR. Per HIE, pt's TTE showed EF 62% with severe AS and severe TR, moderate MR. CT surgery and structural heart team following. TAVR work up completed, s/p CT coronaries 4/10 and cardiac angiogram 4/11. And is now s/p TURBT 4/12, and now on CBI.      1- hyperkalemia  2- severe AS  3- bladder CA  4- hematuria       k is better  serum creatinine abnormal   edema lasix 20 mg po daily   monitor creatinine   low k diet

## 2023-04-14 NOTE — DISCHARGE NOTE NURSING/CASE MANAGEMENT/SOCIAL WORK - PATIENT PORTAL LINK FT
You can access the FollowMyHealth Patient Portal offered by [facility] by registering at the following website: [email or web address]. By joining [email or web address] FollowMyHealth portal, you will also be able to view your health information using other applications (apps) compatible with our system.

## 2023-04-14 NOTE — PROGRESS NOTE ADULT - SUBJECTIVE AND OBJECTIVE BOX
[facility]   [phone]  RENAL FOLLOW UP NOTE  --------------------------------------------------------------------------------  Chief Complaint:    24 hour events/subjective:    seen earlier  states wants to go home   is on CBI when seen     PAST HISTORY  --------------------------------------------------------------------------------  No significant changes to PMH, PSH, FHx, SHx, unless otherwise noted    ALLERGIES & MEDICATIONS  --------------------------------------------------------------------------------  Allergies    No Known Allergies    Intolerances      Standing Inpatient Medications  ascorbic acid 500 milliGRAM(s) Oral daily  aspirin enteric coated 81 milliGRAM(s) Oral daily  cholecalciferol 2000 Unit(s) Oral daily  enoxaparin Injectable 40 milliGRAM(s) SubCutaneous every 24 hours  furosemide    Tablet 20 milliGRAM(s) Oral daily  melatonin 3 milliGRAM(s) Oral at bedtime  sodium chloride 0.9% lock flush 3 milliLiter(s) IV Push every 8 hours  tamsulosin 0.4 milliGRAM(s) Oral at bedtime    PRN Inpatient Medications      REVIEW OF SYSTEMS  --------------------------------------------------------------------------------    Gen: denies  fevers/chills,  CVS: denies chest pain/palpitations  Resp: denies SOB/Cough  GI: Denies N/V/Abd pain  : Denies dysuria    VITALS/PHYSICAL EXAM  --------------------------------------------------------------------------------  T(C): 36.7 (04-14-23 @ 13:37), Max: 36.7 (04-14-23 @ 05:51)  HR: 79 (04-14-23 @ 13:37) (73 - 79)  BP: 107/71 (04-14-23 @ 13:37) (107/71 - 119/83)  RR: 18 (04-14-23 @ 13:37) (18 - 18)  SpO2: 98% (04-14-23 @ 13:37) (93% - 98%)  Wt(kg): --        04-13-23 @ 07:01  -  04-14-23 @ 07:00  --------------------------------------------------------  IN: 7070 mL / OUT: 38043 mL / NET: -3830 mL    04-14-23 @ 07:01  -  04-14-23 @ 21:05  --------------------------------------------------------  IN: 985 mL / OUT: 1200 mL / NET: -215 mL      Physical Exam:  	    Gen: elderly male, pale, appears comfortable on RA   	Pulm: decrease bs  no rales or ronchi or wheezing  	CV: Mild JVD. RRR, S1S2; no rub, +murmur II/VI  	Back: no sacral edema  	Abd: +BS, soft, nontender/nondistended  	: No suprapubic distention, CBI in progress  	UE: Warm, no cyanosis  no clubbing,  no edema;   	LE: Warm, no cyanosis  no clubbing,   	Neuro: alert and oriented.       LABS/STUDIES  --------------------------------------------------------------------------------              13.6   8.86  >-----------<  166      [04-14-23 @ 06:24]              42.5     137  |  104  |  30  ----------------------------<  95      [04-14-23 @ 06:24]  4.5   |  22  |  1.37        Ca     9.3     [04-14-23 @ 06:24]      PT/INR: PT 12.5 , INR 1.08       [04-14-23 @ 06:24]  PTT: 27.6       [04-14-23 @ 06:24]    CK 55      [04-13-23 @ 07:55]        [04-13-23 @ 07:55]    Creatinine Trend:  SCr 1.37 [04-14 @ 06:24]  SCr 1.08 [04-13 @ 06:29]  SCr 1.13 [04-12 @ 16:23]  SCr 1.49 [04-12 @ 05:36]  SCr 1.20 [04-11 @ 06:01]              Urinalysis - [04-08-23 @ 06:12]      Color Light Yellow / Appearance Clear / SG 1.009 / pH 6.5      Gluc Negative / Ketone Negative  / Bili Negative / Urobili Negative       Blood Moderate / Protein Trace / Leuk Est Negative / Nitrite Negative      RBC 10 / WBC 3 / Hyaline 0 / Gran  / Sq Epi  / Non Sq Epi  / Bacteria Negative      TSH 1.92      [04-07-23 @ 05:42]

## 2023-04-14 NOTE — DISCHARGE NOTE NURSING/CASE MANAGEMENT/SOCIAL WORK - NSDCFUADDAPPT_GEN_ALL_CORE_FT
Make a follow up appt with DR. Mccollum, Internist/Cardiologist in 1-2 weeks  Follow up with urologist, DR. Dong Billingsley on April 18th

## 2023-04-14 NOTE — DISCHARGE NOTE PROVIDER - HOSPITAL COURSE
88M PMH AF, bladder cancer, s/p ORIF presenting from PCP's office for AS evaluation. Patient reports being seen for medical clearance for bladder surgery (planned 4/12) yesterday and was found to have AS, was told to go to ED for evaluation for surgery/TAVR w/u by cardiologist Dr. Mccollum. Per HIE, pt's TTE showed EF 62% with severe AS and severe TR, moderate MR. Patient denies SOB, syncope, dizziness, CP, palpitations, or worsening LE swelling. Pt has chronic LE edema.    CT surgery consulted for surgical evaluation of severe AS, severe TR, moderate MR. All labs and imaging reviewed with on call CT surgeon, Dr. Lang. Plan for structural consult in AM, TAVR CT.     4/7 Admit to CT surgery service, structural consult in AM, TAVR CT  4/8: Stable overnight, Remains afib ? heparin gtt?  4/9 VSS.  Currently therapeutic on heparin gtt.  Pending TAVR CT tomorrow and eventual cardiac cath as part of TAVR w/u.  No CP or SOB   4/10 VSS; afib  on heparin gttp for AC; TAVR CT done this am; if cr stable; cath in am tue with Dr. Pearson  OR date TBD   4/11 cardiac cath  non obs cors disease    Dr Pearson  cardio to clear pt for bladder surgery   hep gtt no longer needed  4/12   npo  for baldder procedure   hep off  afib  vss  4/13     CBI   sp   Turbt w stent       potassium 5.7   renal cslt       vss  4/14     cbi clamped for  per uro   watch color of output,   lasix  pedal edema-   As per urology, pt cleared for d/c home - on asa 81mg po daily only - d/w DR. Lang & Dr. Pearson.  pt will return for TAVR as an outpatient. 88M PMH AF, bladder cancer, s/p ORIF presenting from PCP's office for AS evaluation. Patient reports being seen for medical clearance for bladder surgery (planned 4/12) yesterday and was found to have AS, was told to go to ED for evaluation for surgery/TAVR w/u by cardiologist Dr. Mccollum. Per HIE, pt's TTE showed EF 62% with severe AS and severe TR, moderate MR. Patient denies SOB, syncope, dizziness, CP, palpitations, or worsening LE swelling. Pt has chronic LE edema.    CT surgery consulted for surgical evaluation of severe AS, severe TR, moderate MR. All labs and imaging reviewed with on call CT surgeon, Dr. Lang. Plan for structural consult in AM, TAVR CT.     4/7 Admit to CT surgery service, structural consult in AM, TAVR CT  4/8: Stable overnight, Remains afib ? heparin gtt?  4/9 VSS.  Currently therapeutic on heparin gtt.  Pending TAVR CT tomorrow and eventual cardiac cath as part of TAVR w/u.  No CP or SOB   4/10 VSS; afib  on heparin gttp for AC; TAVR CT done this am; if cr stable; cath in am tue with Dr. Pearson  OR date TBD   4/11 cardiac cath  non obs cors disease    Dr Pearson  cardio to clear pt for bladder surgery   hep gtt no longer needed  4/12   npo  for baldder procedure   hep off  afib  vss  4/13     CBI   sp   Turbt w stent       potassium 5.7   renal cslt       vss  4/14     cbi clamped for  per uro   watch color of output,   lasix  pedal edema-   As per urology, pt cleared for d/c home - on asa 81mg po daily only - d/w DR. Lang & Dr. Pearson.  pt will return for TAVR as an outpatient.  pt will follow up with Cardiologist/Internist, Dr. Mccollum in 1-2 weeks 88M PMH AF, bladder cancer, s/p ORIF presenting from PCP's office for AS evaluation. Patient reports being seen for medical clearance for bladder surgery (planned 4/12) yesterday and was found to have AS, was told to go to ED for evaluation for surgery/TAVR w/u by cardiologist Dr. Mccollum. Per HIE, pt's TTE showed EF 62% with severe AS and severe TR, moderate MR. Patient denies SOB, syncope, dizziness, CP, palpitations, or worsening LE swelling. Pt has chronic LE edema.    CT surgery consulted for surgical evaluation of severe AS, severe TR, moderate MR. All labs and imaging reviewed with on call CT surgeon, Dr. Lang. Plan for structural consult in AM, TAVR CT.     4/7 Admit to CT surgery service, structural consult in AM, TAVR CT  4/8: Stable overnight, Remains afib ? heparin gtt?  4/9 VSS.  Currently therapeutic on heparin gtt.  Pending TAVR CT tomorrow and eventual cardiac cath as part of TAVR w/u.  No CP or SOB   4/10 VSS; afib  on heparin gttp for AC; TAVR CT done this am; if cr stable; cath in am tue with Dr. Pearson  OR date TBD   4/11 cardiac cath  non obs cors disease    Dr Pearson  cardio to clear pt for bladder surgery   hep gtt no longer needed  4/12   npo  for baldder procedure   hep off  afib  vss  4/13     CBI   sp   Turbt w stent       potassium 5.7   renal cslt       vss  4/14     cbi clamped for  per uro   watch color of output,   lasix  pedal edema-   As per urology, pt cleared for d/c home - on asa 81mg po daily only - d/w DR. Lang & Dr. Pearson.  pt will return for TAVR as an outpatient.  pt will follow up with Cardiologist/Internist, Dr. Mccollum in 1-2 weeks  follow up with urologist on Tuesday, April 18th for trial of void

## 2023-04-14 NOTE — DISCHARGE NOTE NURSING/CASE MANAGEMENT/SOCIAL WORK - NSDCPEFALRISK_GEN_ALL_CORE
For information on Fall & Injury Prevention, visit: [email or web address] OR  [email or web address] OR  https://www.cdc.gov/steadi/patient.html

## 2023-04-14 NOTE — DISCHARGE NOTE PROVIDER - NSDCMRMEDTOKEN_GEN_ALL_CORE_FT
aspirin 81 mg oral delayed release tablet: 1 tab(s) orally once a day  furosemide 20 mg oral tablet: 1 tab(s) orally once a day  tamsulosin 0.4 mg oral capsule: 1 cap(s) orally once a day (at bedtime)  Vitamin C 500 mg oral tablet: 1 tab(s) orally once a day  Vitamin D3: orally once a day

## 2023-04-14 NOTE — PROGRESS NOTE ADULT - SUBJECTIVE AND OBJECTIVE BOX
DAILY PROGRESS NOTE:         24 hr events:  cbi clamped, urine light red    Objective:    Vital Signs Last 24 Hrs  T(C): 36.7 (14 Apr 2023 08:30), Max: 36.7 (13 Apr 2023 20:21)  T(F): 98.1 (14 Apr 2023 08:30), Max: 98.1 (13 Apr 2023 20:21)  HR: 78 (14 Apr 2023 08:30) (73 - 98)  BP: 119/68 (14 Apr 2023 08:30) (110/69 - 134/97)  BP(mean): --  RR: 18 (14 Apr 2023 08:30) (18 - 18)  SpO2: 96% (14 Apr 2023 08:30) (93% - 96%)    Parameters below as of 14 Apr 2023 08:30  Patient On (Oxygen Delivery Method): room air        I&O's Detail    13 Apr 2023 07:01  -  14 Apr 2023 07:00  --------------------------------------------------------  IN:    Continuous Bladder Irrigation (mL): 6000 mL    IV PiggyBack: 150 mL    Oral Fluid: 920 mL  Total IN: 7070 mL    OUT:    Continuous Bladder Irrigation (mL): 6000 mL    Indwelling Catheter - Urethral (mL): 4900 mL  Total OUT: 18348 mL    Total NET: -3830 mL      14 Apr 2023 07:01  -  14 Apr 2023 13:21  --------------------------------------------------------  IN:    Oral Fluid: 360 mL  Total IN: 360 mL    OUT:  Total OUT: 0 mL    Total NET: 360 mL          Physical Exam:    General: NAD, well-nourished  Resp: Breathing comfortably on RA  CV: regular rate   : urine light red off cbi       Laboratory Results:                          13.6   8.86  )-----------( 166      ( 14 Apr 2023 06:24 )             42.5     04-14    137  |  104  |  30<H>  ----------------------------<  95  4.5   |  22  |  1.37<H>    Ca    9.3      14 Apr 2023 06:24      PT/INR - ( 14 Apr 2023 06:24 )   PT: 12.5 sec;   INR: 1.08 ratio         PTT - ( 14 Apr 2023 06:24 )  PTT:27.6 sec

## 2023-04-14 NOTE — DISCHARGE NOTE PROVIDER - NSDCCPCAREPLAN_GEN_ALL_CORE_FT
PRINCIPAL DISCHARGE DIAGNOSIS  Diagnosis: Aortic stenosis  Assessment and Plan of Treatment: Rebollar to side bag drainage  take medications as prescribed  ambulate 4-5 times a day  follow up appt with your Cardiologist in 1-2 weeks  TAVR as an outpatient       PRINCIPAL DISCHARGE DIAGNOSIS  Diagnosis: Aortic stenosis  Assessment and Plan of Treatment: Rebollar to side bag drainage  take medications as prescribed  ambulate 4-5 times a day  follow up appt with your Cardiologist, DR. Eng,  in 1-2 weeks  TAVR as an outpatient with DR. Sánchez Pearson

## 2023-04-14 NOTE — DISCHARGE NOTE PROVIDER - NSDCFUSCHEDAPPT_GEN_ALL_CORE_FT
Dong Billingsley Physician [facility]  UROLOGY [address] [facility]  Scheduled Appointment: 04/18/2023

## 2023-04-14 NOTE — CHART NOTE - NSCHARTNOTEFT_GEN_A_CORE
Patient is cleared for discharge from the Urolgoy standpoint.  Discharge the patient with the farrar catheter ot leg bag and 1 week of augmentin.  He will follow up with his urologist Dr Billingsley on Tuesday for TOV.  No anticoagulation, can continue aspirin.  D/w Dr Billingsley and primary team. Patient is cleared for discharge from the Urology standpoint.  Discharge the patient with the farrar catheter to leg bag and 1 week of augmentin.  He will follow up with his urologist Dr Billingsley on Tuesday for TOV.  No anticoagulation, can continue aspirin.  D/w Dr Billingsley and primary team.

## 2023-04-14 NOTE — PROGRESS NOTE ADULT - SUBJECTIVE AND OBJECTIVE BOX
VITAL SIGNS    Telemetry:      Vital Signs Last 24 Hrs  T(C): 36.7 (04-14-23 @ 05:51), Max: 36.7 (04-13-23 @ 20:21)  T(F): 98 (04-14-23 @ 05:51), Max: 98.1 (04-13-23 @ 20:21)  HR: 73 (04-14-23 @ 05:51) (73 - 98)  BP: 119/83 (04-14-23 @ 05:51) (110/69 - 134/97)  RR: 18 (04-14-23 @ 05:51) (18 - 18)  SpO2: 93% (04-14-23 @ 05:51) (93% - 96%)                   Daily     Daily         CAPILLARY BLOOD GLUCOSE                           PHYSICAL EXAM  s"  Neurology: alert and oriented x 3, moves all extremities with no defecits  CV :  RRR  Lungs:   CTA B/L  Abdomen: soft, nontender, nondistended, positive bowel sounds, last bowel movement   Extremities:                                            VITAL SIGNS    Telemetry:     AFIB    60    Vital Signs Last 24 Hrs  T(C): 36.7 (04-14-23 @ 05:51), Max: 36.7 (04-13-23 @ 20:21)  T(F): 98 (04-14-23 @ 05:51), Max: 98.1 (04-13-23 @ 20:21)  HR: 73 (04-14-23 @ 05:51) (73 - 98)  BP: 119/83 (04-14-23 @ 05:51) (110/69 - 134/97)  RR: 18 (04-14-23 @ 05:51) (18 - 18)  SpO2: 93% (04-14-23 @ 05:51) (93% - 96%)                   Daily     Daily         CAPILLARY BLOOD GLUCOSE                           PHYSICAL EXAM  s"   nO SOB  nO cp'  Neurology: alert and oriented x 3, moves all extremities with no defecits  CV :  RRR  Lungs:   CTA B/L  Abdomen: soft, nontender, nondistended, positive bowel sounds  Extremities:     PLUS ONE PEDAL EDEMA    + CBI

## 2023-04-14 NOTE — PROGRESS NOTE ADULT - PROBLEM SELECTOR PLAN 1
c/w ASA 81  Preop w/u   for TAVR  -  follow up as outpt  for TAVR        sp Turbt w stent w uro   now w CBI to be managed by urolgy  pt will need to go home w farrar   ok for dvt prophlaxix  per uro c/w ASA 81  Preop w/u   for TAVR  -  follow up as outpt  for TAVR   Home after uro issues resolved        sp Turbt w stent w uro   now w CBI to be managed by urolgy  this am clamped,  pt will need to go home w farrar   ok for dvt prophlaxix  per uro

## 2023-04-17 ENCOUNTER — NON-APPOINTMENT (OUTPATIENT)
Age: 88
End: 2023-04-17

## 2023-04-18 ENCOUNTER — APPOINTMENT (OUTPATIENT)
Dept: UROLOGY | Facility: CLINIC | Age: 88
End: 2023-04-18
Payer: MEDICARE

## 2023-04-18 VITALS
HEART RATE: 95 BPM | TEMPERATURE: 96.8 F | DIASTOLIC BLOOD PRESSURE: 70 MMHG | OXYGEN SATURATION: 98 % | SYSTOLIC BLOOD PRESSURE: 112 MMHG

## 2023-04-18 DIAGNOSIS — N32.9 BLADDER DISORDER, UNSPECIFIED: ICD-10-CM

## 2023-04-18 PROCEDURE — 51700 IRRIGATION OF BLADDER: CPT

## 2023-04-18 PROCEDURE — A4216: CPT | Mod: NC

## 2023-04-18 PROCEDURE — 51798 US URINE CAPACITY MEASURE: CPT

## 2023-04-19 LAB — SURGICAL PATHOLOGY STUDY: SIGNIFICANT CHANGE UP

## 2023-04-25 ENCOUNTER — APPOINTMENT (OUTPATIENT)
Dept: UROLOGY | Facility: CLINIC | Age: 88
End: 2023-04-25
Payer: MEDICARE

## 2023-04-25 VITALS
SYSTOLIC BLOOD PRESSURE: 94 MMHG | OXYGEN SATURATION: 99 % | TEMPERATURE: 97.8 F | DIASTOLIC BLOOD PRESSURE: 62 MMHG | HEART RATE: 91 BPM

## 2023-04-25 PROCEDURE — 99215 OFFICE O/P EST HI 40 MIN: CPT

## 2023-04-25 RX ORDER — MULTIVIT-MIN/IRON/FOLIC ACID/K 18-600-40
500 CAPSULE ORAL
Refills: 0 | Status: ACTIVE | COMMUNITY

## 2023-04-25 RX ORDER — FUROSEMIDE 20 MG/1
20 TABLET ORAL
Refills: 0 | Status: ACTIVE | COMMUNITY

## 2023-04-25 RX ORDER — TAMSULOSIN HYDROCHLORIDE 0.4 MG/1
0.4 CAPSULE ORAL
Refills: 0 | Status: ACTIVE | COMMUNITY

## 2023-04-25 NOTE — ADDENDUM
[FreeTextEntry1] : I, Flaquita Luevano, solely acted as scribe for Dr. Dong Billingsley on 04/25/2023.\par

## 2023-04-25 NOTE — END OF VISIT
[FreeTextEntry3] : All medical record entries made by the scribe today, were at my direction and personally dictated to them by me, Dr. Dong Billingsley on 04/25/2023. I have reviewed the chart and agree that the record accurately reflects my personal performance of the history, physical exam, assessment, and plan. I have also personally directed, reviewed, and agreed with the chart.\par

## 2023-04-25 NOTE — ASSESSMENT
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\par \par discussed pathology results w pt and wife at length-total time 40 minutes\par \par advise staging TUR  biopsy of bladder tumor site and random bladder /prostatic urethral biopsy\par \par exchange JJ at that tiime\par \par will likely then undergo BCG treatgment\par \par Will address renal mass subsequently-?partial vs radical nx vs observation\par \par He will need to see cardiology for optimization-would hold off on TAVR at this time if possible\par will also need to remain off of formal A/C now-can remain on ASA\par \par d/w pt and wife in great detail-rationale risk alternaitves reviewed-all questions answered\par \par continue w tamsulosin 0.4 mg\par \par f/u pending scheduling

## 2023-04-25 NOTE — HISTORY OF PRESENT ILLNESS
[FreeTextEntry1] : 87 y/o male here for discussion of pathology results\par \par s/p TUR for bladder tumor 4/12/2023 w stent placement\par \par pathology results 4/12/2023-\par high grade urothelial carcinoma\par papillary growth\par muscularis propria present, not involved\par local invasion lamina ?\par \par is taking tamsulosin 0.4 mg\par

## 2023-04-28 ENCOUNTER — OUTPATIENT (OUTPATIENT)
Dept: OUTPATIENT SERVICES | Facility: HOSPITAL | Age: 88
LOS: 1 days | End: 2023-04-28
Payer: MEDICARE

## 2023-04-28 VITALS
SYSTOLIC BLOOD PRESSURE: 110 MMHG | OXYGEN SATURATION: 97 % | TEMPERATURE: 98 F | DIASTOLIC BLOOD PRESSURE: 73 MMHG | RESPIRATION RATE: 18 BRPM | WEIGHT: 225.97 LBS | HEIGHT: 69.5 IN | HEART RATE: 89 BPM

## 2023-04-28 DIAGNOSIS — I35.0 NONRHEUMATIC AORTIC (VALVE) STENOSIS: ICD-10-CM

## 2023-04-28 DIAGNOSIS — C67.9 MALIGNANT NEOPLASM OF BLADDER, UNSPECIFIED: ICD-10-CM

## 2023-04-28 DIAGNOSIS — Z01.818 ENCOUNTER FOR OTHER PREPROCEDURAL EXAMINATION: ICD-10-CM

## 2023-04-28 DIAGNOSIS — Z96.641 PRESENCE OF RIGHT ARTIFICIAL HIP JOINT: Chronic | ICD-10-CM

## 2023-04-28 LAB
ANION GAP SERPL CALC-SCNC: 14 MMOL/L — SIGNIFICANT CHANGE UP (ref 5–17)
BLD GP AB SCN SERPL QL: NEGATIVE — SIGNIFICANT CHANGE UP
BUN SERPL-MCNC: 37 MG/DL — HIGH (ref 7–23)
CALCIUM SERPL-MCNC: 9.5 MG/DL — SIGNIFICANT CHANGE UP (ref 8.4–10.5)
CHLORIDE SERPL-SCNC: 105 MMOL/L — SIGNIFICANT CHANGE UP (ref 96–108)
CO2 SERPL-SCNC: 20 MMOL/L — LOW (ref 22–31)
CREAT SERPL-MCNC: 1.31 MG/DL — HIGH (ref 0.5–1.3)
EGFR: 52 ML/MIN/1.73M2 — LOW
GLUCOSE SERPL-MCNC: 113 MG/DL — HIGH (ref 70–99)
HCT VFR BLD CALC: 41.7 % — SIGNIFICANT CHANGE UP (ref 39–50)
HGB BLD-MCNC: 13.9 G/DL — SIGNIFICANT CHANGE UP (ref 13–17)
MCHC RBC-ENTMCNC: 32.6 PG — SIGNIFICANT CHANGE UP (ref 27–34)
MCHC RBC-ENTMCNC: 33.3 GM/DL — SIGNIFICANT CHANGE UP (ref 32–36)
MCV RBC AUTO: 97.7 FL — SIGNIFICANT CHANGE UP (ref 80–100)
NRBC # BLD: 0 /100 WBCS — SIGNIFICANT CHANGE UP (ref 0–0)
PLATELET # BLD AUTO: 236 K/UL — SIGNIFICANT CHANGE UP (ref 150–400)
POTASSIUM SERPL-MCNC: 4.6 MMOL/L — SIGNIFICANT CHANGE UP (ref 3.5–5.3)
POTASSIUM SERPL-SCNC: 4.6 MMOL/L — SIGNIFICANT CHANGE UP (ref 3.5–5.3)
RBC # BLD: 4.27 M/UL — SIGNIFICANT CHANGE UP (ref 4.2–5.8)
RBC # FLD: 13 % — SIGNIFICANT CHANGE UP (ref 10.3–14.5)
RH IG SCN BLD-IMP: POSITIVE — SIGNIFICANT CHANGE UP
SODIUM SERPL-SCNC: 139 MMOL/L — SIGNIFICANT CHANGE UP (ref 135–145)
WBC # BLD: 6.5 K/UL — SIGNIFICANT CHANGE UP (ref 3.8–10.5)
WBC # FLD AUTO: 6.5 K/UL — SIGNIFICANT CHANGE UP (ref 3.8–10.5)

## 2023-04-28 PROCEDURE — 86901 BLOOD TYPING SEROLOGIC RH(D): CPT

## 2023-04-28 PROCEDURE — 86900 BLOOD TYPING SEROLOGIC ABO: CPT

## 2023-04-28 PROCEDURE — 36415 COLL VENOUS BLD VENIPUNCTURE: CPT

## 2023-04-28 PROCEDURE — 80048 BASIC METABOLIC PNL TOTAL CA: CPT

## 2023-04-28 PROCEDURE — 87086 URINE CULTURE/COLONY COUNT: CPT

## 2023-04-28 PROCEDURE — 85027 COMPLETE CBC AUTOMATED: CPT

## 2023-04-28 PROCEDURE — G0463: CPT

## 2023-04-28 PROCEDURE — 86850 RBC ANTIBODY SCREEN: CPT

## 2023-04-28 RX ORDER — CEFAZOLIN SODIUM 1 G
2000 VIAL (EA) INJECTION ONCE
Refills: 0 | Status: COMPLETED | OUTPATIENT
Start: 2023-05-17 | End: 2023-05-17

## 2023-04-28 NOTE — H&P PST ADULT - NSICDXPASTMEDICALHX_GEN_ALL_CORE_FT
PAST MEDICAL HISTORY:  Bladder cancer     Chronic atrial fibrillation     Gross hematuria     History of small bowel obstruction     Severe aortic valve stenosis

## 2023-04-28 NOTE — H&P PST ADULT - PROBLEM SELECTOR PLAN 1
Transurethral Resection Of Bladder Tumor biopsy with stent exchange on 5/17/23.  pre- op instructions discussed   Labs sent ,urine culture done Transurethral Resection Of Bladder Tumor biopsy with stent exchange on 5/17/23.  pre- op instructions discussed   Labs repeated due to hematuria  ,urine culture done

## 2023-04-28 NOTE — H&P PST ADULT - HISTORY OF PRESENT ILLNESS
88 year old Male PMH AF no AC, bladder cancer s/p/p TURBT  and stent placement in 4/12/23, h/o  right  HIP ORIF ( 2021) . Patient reports being seen for medical clearance work up for bladder surgery (planned 4/12) and was found to have severe AS, severe TR, moderate MR. Per HIE, pt's TTE showed EF 62% with severe AS and severe TR, moderate MR. CT surgery and structural heart team following. TAVR work up completed, s/p CT coronaries 4/10 and cardiac angiogram 4/11. Presents to PST for scheduled Transurethral Resection Of Bladder Tumor biopsy with stent exchange on 5/17/23. pt denies ny CP, palpitations  + hematuria   as per Ct surgery notes on  4/11 cardiac cath  non obs cors disease    Dr Pearson  cardio to clearedprior bladder surgery( 4/12/23)       88 year old Male PMH AF no AC, bladder cancer s/p/p TURBT  and stent placement in 4/12/23, h/o  right  HIP ORIF ( 2021) . Patient reports being seen for medical clearance work up for bladder surgery (planned 4/12) and was found to have severe AS, severe TR, moderate MR. Per HIE, pt's TTE showed EF 62% with severe AS and severe TR, moderate MR. CT surgery and structural heart team following. TAVR work up completed, s/p CT coronaries 4/10 and cardiac angiogram 4/11. Presents to PST for scheduled Transurethral Resection Of Bladder Tumor biopsy with stent exchange on 5/17/23. pt denies ny CP, palpitations  + hematuria   as per Ct surgery notes on  4/11 cardiac cath  non obs cors disease    Dr Pearson  cardio to cleared prior bladder surgery ( 4/12/23)

## 2023-04-28 NOTE — H&P PST ADULT - OTHER CARE PROVIDERS
cardiology  - pt not remember the name  [phone]( appointment 5/2/23 cardiology  - pt not remember the name ?  Dr Manley ( appointment 5/2/23

## 2023-04-28 NOTE — H&P PST ADULT - ASSESSMENT
ACTIVITY-  daily activities ,walk with walker   Energy Expenditure(Mets):  4.25 by dasi    Symptoms-CHAVIRA  Airway:  normal    Mallampati-     2  Dental: upper dentures

## 2023-04-30 LAB
CULTURE RESULTS: SIGNIFICANT CHANGE UP
SPECIMEN SOURCE: SIGNIFICANT CHANGE UP

## 2023-05-03 ENCOUNTER — APPOINTMENT (OUTPATIENT)
Dept: INTERNAL MEDICINE | Facility: CLINIC | Age: 88
End: 2023-05-03

## 2023-05-03 ENCOUNTER — APPOINTMENT (OUTPATIENT)
Dept: CARDIOLOGY | Facility: CLINIC | Age: 88
End: 2023-05-03
Payer: MEDICARE

## 2023-05-03 VITALS
DIASTOLIC BLOOD PRESSURE: 64 MMHG | HEART RATE: 98 BPM | WEIGHT: 234 LBS | BODY MASS INDEX: 29.09 KG/M2 | OXYGEN SATURATION: 95 % | HEIGHT: 75 IN | TEMPERATURE: 97.9 F | SYSTOLIC BLOOD PRESSURE: 112 MMHG

## 2023-05-03 DIAGNOSIS — R94.31 ABNORMAL ELECTROCARDIOGRAM [ECG] [EKG]: ICD-10-CM

## 2023-05-03 DIAGNOSIS — R01.1 CARDIAC MURMUR, UNSPECIFIED: ICD-10-CM

## 2023-05-03 DIAGNOSIS — I35.0 NONRHEUMATIC AORTIC (VALVE) STENOSIS: ICD-10-CM

## 2023-05-03 PROBLEM — C67.9 MALIGNANT NEOPLASM OF BLADDER, UNSPECIFIED: Chronic | Status: ACTIVE | Noted: 2023-04-28

## 2023-05-03 PROBLEM — Z87.19 PERSONAL HISTORY OF OTHER DISEASES OF THE DIGESTIVE SYSTEM: Chronic | Status: ACTIVE | Noted: 2023-04-28

## 2023-05-03 PROBLEM — R31.0 GROSS HEMATURIA: Chronic | Status: ACTIVE | Noted: 2023-04-28

## 2023-05-03 PROCEDURE — 99214 OFFICE O/P EST MOD 30 MIN: CPT

## 2023-05-03 NOTE — HISTORY OF PRESENT ILLNESS
[FreeTextEntry1] : ANGELIA WRIGHT 88 year M BMI 29 , presents with CAD Aortic vqlve stenosis HTN HLD and reports  NYHA 2-3 dyspnea. 3/21/23 AF 64 BPM RBBB RVH. Audible 3/6 ALIE.  Currently only on ASA but ultimately  warrants (CHADSVASC 2 -3 Age 75, HTN) DOAC. Denies syncope angina but has dyspnea and likely would be more symptomatic if not limited by immobility.  Cr 1.26. No DM. Echo 4/6/23 moderate  valvular AS 38/21 pk/mn gradients    SAVR/TAVR evaluation defer valvular intervention per structural heart team. Interval Bladder tumor resected, now renal mass and further staging investigations underway. Lexiscan nuc possibly  positive for ischemia, negative infarct LVEF 66%. Cath 80% mid second diagonal branch off LAD lesion other lesser severity lesions LCX RCA no intervention undertaken.  112/64 HR 98/min RTC 4w. No med changes.\par

## 2023-05-03 NOTE — PHYSICAL EXAM
[Well Developed] : well developed [Well Nourished] : well nourished [No Acute Distress] : no acute distress [Normal Conjunctiva] : normal conjunctiva [Normal Venous Pressure] : normal venous pressure [No Carotid Bruit] : no carotid bruit [No Rub] : no rub [No Gallop] : no gallop [Murmur] : murmur [Clear Lung Fields] : clear lung fields [Good Air Entry] : good air entry [No Respiratory Distress] : no respiratory distress  [Soft] : abdomen soft [Non Tender] : non-tender [No Masses/organomegaly] : no masses/organomegaly [Normal Bowel Sounds] : normal bowel sounds [Normal Gait] : normal gait [No Edema] : no edema [No Cyanosis] : no cyanosis [No Clubbing] : no clubbing [No Varicosities] : no varicosities [No Rash] : no rash [No Skin Lesions] : no skin lesions [Moves all extremities] : moves all extremities [No Focal Deficits] : no focal deficits [Normal Speech] : normal speech [Alert and Oriented] : alert and oriented [Normal memory] : normal memory [de-identified] : see above [de-identified] : Iregular pulse, Variable S1, widely split s2 ,3/6 ALIE to carotids

## 2023-05-15 ENCOUNTER — NON-APPOINTMENT (OUTPATIENT)
Age: 88
End: 2023-05-15

## 2023-05-15 RX ORDER — CEFDINIR 300 MG/1
300 CAPSULE ORAL
Qty: 14 | Refills: 2 | Status: ACTIVE | COMMUNITY
Start: 2023-05-15 | End: 1900-01-01

## 2023-05-16 ENCOUNTER — TRANSCRIPTION ENCOUNTER (OUTPATIENT)
Age: 88
End: 2023-05-16

## 2023-05-17 ENCOUNTER — TRANSCRIPTION ENCOUNTER (OUTPATIENT)
Age: 88
End: 2023-05-17

## 2023-05-17 ENCOUNTER — RESULT REVIEW (OUTPATIENT)
Age: 88
End: 2023-05-17

## 2023-05-17 ENCOUNTER — OUTPATIENT (OUTPATIENT)
Dept: INPATIENT UNIT | Facility: HOSPITAL | Age: 88
LOS: 1 days | End: 2023-05-17
Payer: MEDICARE

## 2023-05-17 ENCOUNTER — APPOINTMENT (OUTPATIENT)
Dept: UROLOGY | Facility: HOSPITAL | Age: 88
End: 2023-05-17

## 2023-05-17 VITALS
WEIGHT: 225.97 LBS | SYSTOLIC BLOOD PRESSURE: 154 MMHG | HEIGHT: 69.5 IN | DIASTOLIC BLOOD PRESSURE: 90 MMHG | TEMPERATURE: 98 F | OXYGEN SATURATION: 99 % | RESPIRATION RATE: 18 BRPM | HEART RATE: 92 BPM

## 2023-05-17 DIAGNOSIS — Z96.641 PRESENCE OF RIGHT ARTIFICIAL HIP JOINT: Chronic | ICD-10-CM

## 2023-05-17 DIAGNOSIS — C67.9 MALIGNANT NEOPLASM OF BLADDER, UNSPECIFIED: ICD-10-CM

## 2023-05-17 PROCEDURE — 88307 TISSUE EXAM BY PATHOLOGIST: CPT | Mod: 26

## 2023-05-17 PROCEDURE — 88305 TISSUE EXAM BY PATHOLOGIST: CPT | Mod: 26

## 2023-05-17 PROCEDURE — 52235 CYSTOSCOPY AND TREATMENT: CPT

## 2023-05-17 PROCEDURE — 52332 CYSTOSCOPY AND TREATMENT: CPT | Mod: RT

## 2023-05-17 DEVICE — URETERAL CATH OPEN END 5FR 70CM
Type: IMPLANTABLE DEVICE | Site: RIGHT | Status: NON-FUNCTIONAL
Removed: 2023-05-17

## 2023-05-17 DEVICE — GUIDEWIRE SENSOR DUAL-FLEX NITINOL STRAIGHT .035" X 150CM
Type: IMPLANTABLE DEVICE | Site: RIGHT | Status: NON-FUNCTIONAL
Removed: 2023-05-17

## 2023-05-17 DEVICE — STENT URET INLAY OPTIMA 6FRX24CM
Type: IMPLANTABLE DEVICE | Site: RIGHT | Status: NON-FUNCTIONAL
Removed: 2023-05-17

## 2023-05-17 RX ORDER — SODIUM CHLORIDE 9 MG/ML
3 INJECTION INTRAMUSCULAR; INTRAVENOUS; SUBCUTANEOUS EVERY 8 HOURS
Refills: 0 | Status: DISCONTINUED | OUTPATIENT
Start: 2023-05-17 | End: 2023-05-17

## 2023-05-17 RX ORDER — ASCORBIC ACID 60 MG
1 TABLET,CHEWABLE ORAL
Qty: 0 | Refills: 0 | DISCHARGE

## 2023-05-17 RX ORDER — LIDOCAINE HCL 20 MG/ML
0.2 VIAL (ML) INJECTION ONCE
Refills: 0 | Status: DISCONTINUED | OUTPATIENT
Start: 2023-05-17 | End: 2023-05-17

## 2023-05-17 RX ORDER — ASPIRIN/CALCIUM CARB/MAGNESIUM 324 MG
81 TABLET ORAL DAILY
Refills: 0 | Status: DISCONTINUED | OUTPATIENT
Start: 2023-05-18 | End: 2023-05-31

## 2023-05-17 RX ORDER — TAMSULOSIN HYDROCHLORIDE 0.4 MG/1
0.4 CAPSULE ORAL AT BEDTIME
Refills: 0 | Status: DISCONTINUED | OUTPATIENT
Start: 2023-05-17 | End: 2023-05-31

## 2023-05-17 RX ORDER — CEPHALEXIN 500 MG
500 CAPSULE ORAL EVERY 12 HOURS
Refills: 0 | Status: DISCONTINUED | OUTPATIENT
Start: 2023-05-18 | End: 2023-05-31

## 2023-05-17 RX ORDER — ONDANSETRON 8 MG/1
4 TABLET, FILM COATED ORAL ONCE
Refills: 0 | Status: DISCONTINUED | OUTPATIENT
Start: 2023-05-17 | End: 2023-05-18

## 2023-05-17 RX ORDER — PHENYLEPHRINE HYDROCHLORIDE 10 MG/ML
0.5 INJECTION INTRAVENOUS
Qty: 40 | Refills: 0 | Status: DISCONTINUED | OUTPATIENT
Start: 2023-05-17 | End: 2023-05-17

## 2023-05-17 RX ORDER — HYDROMORPHONE HYDROCHLORIDE 2 MG/ML
0.25 INJECTION INTRAMUSCULAR; INTRAVENOUS; SUBCUTANEOUS
Refills: 0 | Status: DISCONTINUED | OUTPATIENT
Start: 2023-05-17 | End: 2023-05-18

## 2023-05-17 RX ORDER — FUROSEMIDE 40 MG
20 TABLET ORAL DAILY
Refills: 0 | Status: DISCONTINUED | OUTPATIENT
Start: 2023-05-18 | End: 2023-05-31

## 2023-05-17 RX ORDER — CHOLECALCIFEROL (VITAMIN D3) 125 MCG
0 CAPSULE ORAL
Qty: 0 | Refills: 0 | DISCHARGE

## 2023-05-17 RX ORDER — LIDOCAINE 4 G/100G
1 CREAM TOPICAL DAILY
Refills: 0 | Status: DISCONTINUED | OUTPATIENT
Start: 2023-05-17 | End: 2023-05-31

## 2023-05-17 RX ADMIN — SODIUM CHLORIDE 3 MILLILITER(S): 9 INJECTION INTRAMUSCULAR; INTRAVENOUS; SUBCUTANEOUS at 07:32

## 2023-05-17 RX ADMIN — TAMSULOSIN HYDROCHLORIDE 0.4 MILLIGRAM(S): 0.4 CAPSULE ORAL at 21:09

## 2023-05-17 NOTE — BRIEF OPERATIVE NOTE - COMMENTS
3 cm area of papillary lesion resected on right lateral wall of bladder just superior to right UO. 2 cm papillary bladder tumor resected on posterior bladder wall.    Right ureteral stent exchanged, 6F x 24F placed.     22F 3-way Rebollar placed and gentle CBI initiated. 3 cm area of papillary lesion resected on right lateral wall of bladder just superior to right UO. 2 cm papillary bladder tumor resected on posterior bladder wall.    Right ureteral stent exchanged, 6F x 24F placed.     22F 3-way Rebollar placed and gentle CBI initiated.    Dictation job #[id number]

## 2023-05-17 NOTE — PROGRESS NOTE ADULT - SUBJECTIVE AND OBJECTIVE BOX
Post op Check    Pt seen and examined without complaints. Pain is controlled. Denies SOB/CP/N/V.     Vital Signs Last 24 Hrs  T(C): 36 (17 May 2023 10:00), Max: 36.5 (17 May 2023 05:36)  T(F): 96.8 (17 May 2023 10:00), Max: 97.7 (17 May 2023 05:36)  HR: 67 (17 May 2023 12:30) (62 - 92)  BP: 112/75 (17 May 2023 12:30) (90/69 - 154/90)  BP(mean): 89 (17 May 2023 12:30) (76 - 103)  RR: 18 (17 May 2023 12:30) (18 - 20)  SpO2: 94% (17 May 2023 12:30) (94% - 99%)    Parameters below as of 17 May 2023 12:00  Patient On (Oxygen Delivery Method): room air        I&O's Summary    17 May 2023 07:01  -  17 May 2023 14:17  --------------------------------------------------------  IN: 240 mL / OUT: 0 mL / NET: 240 mL        Physical Exam  Gen: NAD, A&Ox3  Pulm: No respiratory distress, no subcostal retractions  CV: RRR, no JVD  Abd: Soft, NT, ND  Back:   :                 A/P: 88y Male s/p   DVT prophylaxis/OOB  Incentive spirometry  Strict I&O's  Analgesia and antiemetics as needed  Diet  AM labs    Post op Check    Pt seen and examined without complaints. Pain is controlled. Denies SOB/CP/N/V.     Vital Signs Last 24 Hrs  T(C): 36 (17 May 2023 10:00), Max: 36.5 (17 May 2023 05:36)  T(F): 96.8 (17 May 2023 10:00), Max: 97.7 (17 May 2023 05:36)  HR: 67 (17 May 2023 12:30) (62 - 92)  BP: 112/75 (17 May 2023 12:30) (90/69 - 154/90)  BP(mean): 89 (17 May 2023 12:30) (76 - 103)  RR: 18 (17 May 2023 12:30) (18 - 20)  SpO2: 94% (17 May 2023 12:30) (94% - 99%)    Parameters below as of 17 May 2023 12:00  Patient On (Oxygen Delivery Method): room air    I&O's Summary    17 May 2023 07:01  -  17 May 2023 14:17  --------------------------------------------------------  IN: 240 mL / OUT: 0 mL / NET: 240 mL    Physical Exam  Gen: NAD, A&Ox3  Pulm: No respiratory distress, no subcostal retractions  CV: RRR, no JVD  Abd: Soft, NT, ND  Back: no CVAT  : CBI clear on moderate gtt

## 2023-05-17 NOTE — BRIEF OPERATIVE NOTE - OPERATION/FINDINGS
3 cm area of papillary tumor on vicinity just superior to right UO. 2 cm bladder tumor on posterior bladder wall

## 2023-05-17 NOTE — BRIEF OPERATIVE NOTE - NSICDXBRIEFPROCEDURE_GEN_ALL_CORE_FT
PROCEDURES:  TURBT, with biopsy 17-May-2023 10:21:38  Kinza Wilkes  Cystoscopy 17-May-2023 10:21:45  Kinza Wilkes  Change external ureteral stent 17-May-2023 10:22:17  Kinza Wilkes

## 2023-05-18 ENCOUNTER — TRANSCRIPTION ENCOUNTER (OUTPATIENT)
Age: 88
End: 2023-05-18

## 2023-05-18 VITALS
DIASTOLIC BLOOD PRESSURE: 64 MMHG | RESPIRATION RATE: 17 BRPM | HEART RATE: 78 BPM | SYSTOLIC BLOOD PRESSURE: 116 MMHG | OXYGEN SATURATION: 98 % | TEMPERATURE: 97 F

## 2023-05-18 LAB
ANION GAP SERPL CALC-SCNC: 6 MMOL/L — SIGNIFICANT CHANGE UP (ref 5–17)
BUN SERPL-MCNC: 26 MG/DL — HIGH (ref 7–23)
CALCIUM SERPL-MCNC: 9.1 MG/DL — SIGNIFICANT CHANGE UP (ref 8.4–10.5)
CHLORIDE SERPL-SCNC: 107 MMOL/L — SIGNIFICANT CHANGE UP (ref 96–108)
CO2 SERPL-SCNC: 26 MMOL/L — SIGNIFICANT CHANGE UP (ref 22–31)
CREAT SERPL-MCNC: 1.26 MG/DL — SIGNIFICANT CHANGE UP (ref 0.5–1.3)
EGFR: 55 ML/MIN/1.73M2 — LOW
GLUCOSE SERPL-MCNC: 93 MG/DL — SIGNIFICANT CHANGE UP (ref 70–99)
HCT VFR BLD CALC: 40.1 % — SIGNIFICANT CHANGE UP (ref 39–50)
HGB BLD-MCNC: 12.8 G/DL — LOW (ref 13–17)
MCHC RBC-ENTMCNC: 31.7 PG — SIGNIFICANT CHANGE UP (ref 27–34)
MCHC RBC-ENTMCNC: 31.9 GM/DL — LOW (ref 32–36)
MCV RBC AUTO: 99.3 FL — SIGNIFICANT CHANGE UP (ref 80–100)
NRBC # BLD: 0 /100 WBCS — SIGNIFICANT CHANGE UP (ref 0–0)
PLATELET # BLD AUTO: 169 K/UL — SIGNIFICANT CHANGE UP (ref 150–400)
POTASSIUM SERPL-MCNC: 4.8 MMOL/L — SIGNIFICANT CHANGE UP (ref 3.5–5.3)
POTASSIUM SERPL-SCNC: 4.8 MMOL/L — SIGNIFICANT CHANGE UP (ref 3.5–5.3)
RBC # BLD: 4.04 M/UL — LOW (ref 4.2–5.8)
RBC # FLD: 13.2 % — SIGNIFICANT CHANGE UP (ref 10.3–14.5)
SODIUM SERPL-SCNC: 139 MMOL/L — SIGNIFICANT CHANGE UP (ref 135–145)
WBC # BLD: 6.56 K/UL — SIGNIFICANT CHANGE UP (ref 3.8–10.5)
WBC # FLD AUTO: 6.56 K/UL — SIGNIFICANT CHANGE UP (ref 3.8–10.5)

## 2023-05-18 PROCEDURE — C2617: CPT

## 2023-05-18 PROCEDURE — 88305 TISSUE EXAM BY PATHOLOGIST: CPT

## 2023-05-18 PROCEDURE — 76000 FLUOROSCOPY <1 HR PHYS/QHP: CPT

## 2023-05-18 PROCEDURE — 85027 COMPLETE CBC AUTOMATED: CPT

## 2023-05-18 PROCEDURE — C1769: CPT

## 2023-05-18 PROCEDURE — 52235 CYSTOSCOPY AND TREATMENT: CPT

## 2023-05-18 PROCEDURE — 88307 TISSUE EXAM BY PATHOLOGIST: CPT

## 2023-05-18 PROCEDURE — 80048 BASIC METABOLIC PNL TOTAL CA: CPT

## 2023-05-18 PROCEDURE — C9399: CPT

## 2023-05-18 PROCEDURE — C1758: CPT

## 2023-05-18 RX ORDER — CEPHALEXIN 500 MG
1 CAPSULE ORAL
Qty: 10 | Refills: 0
Start: 2023-05-18 | End: 2023-05-22

## 2023-05-18 RX ADMIN — Medication 500 MILLIGRAM(S): at 05:49

## 2023-05-18 RX ADMIN — Medication 20 MILLIGRAM(S): at 05:48

## 2023-05-18 NOTE — PROGRESS NOTE ADULT - SUBJECTIVE AND OBJECTIVE BOX
Urology PA Progress Note    Subjective:  Patient seen and examined at bedside. No acute events overnight. Feels well. No N/V    Objective:  Vital signs  T(F): , Max: 97.9 (05-17-23 @ 19:00)  HR: 85 (05-18-23 @ 06:00)  BP: 141/84 (05-18-23 @ 06:00)  SpO2: 96% (05-18-23 @ 06:00)  Wt(kg): --    Output     05-17 @ 07:01  -  05-18 @ 07:00  --------------------------------------------------------  IN: 1200 mL / OUT: 125 mL / NET: 1075 mL        Physical Exam:  Gen: NAD. AAOx3  Pulm: nonlabored  Abd: soft, NT/ND  : farrar in place, CBI clamped, clear pink    Labs:  05-18  6.56  / 40.1  /1.26                           12.8   6.56  )-----------( 169      ( 18 May 2023 06:09 )             40.1     05-18    139  |  107  |  26<H>  ----------------------------<  93  4.8   |  26  |  1.26    Ca    9.1      18 May 2023 06:09        Cx: Clean Catch Clean Catch (Midstream)  04-28 @ 16:24   <10,000 CFU/mL Normal Urogenital Vanessa  --  --      Clean Catch Clean Catch (Midstream)  04-05 @ 13:30   <10,000 CFU/mL Normal Urogenital Vanessa  --  --      Clean Catch Clean Catch (Midstream)  03-10 @ 21:10   No growth  --  --

## 2023-05-18 NOTE — ASU DISCHARGE PLAN (ADULT/PEDIATRIC) - ASU DC SPECIAL INSTRUCTIONSFT
CATHETER: Empty farrar bag as needed  GENERAL: It is common to have blood in your urine after your procedure. It may be pink or even red; inform your doctor if you have a significant amount of clot in the urine or if you are unable to void at all or if your catheter stops draining. The urine may clear and then become bloody again especially as you are more physically active. It is not uncommon to have some burning when you urinate, this will gradually improve. With a catheter in place, it is not uncommon to have occasional leakage or urine or blood around the catheter. Please call your urologist if this is excessive and/or the urine is not draining through the catheter into the bag.  BATHING: You may shower or bathe. If going home with farrar, shower only until catheter is removed.  DIET: You may resume your regular diet and regular medication regimen.  PAIN: You may take Tylenol (acetaminophen) 650-975mg every 6 hours for pain/discomfort  ANTIBIOTICS: You may be given a prescription for an antibiotic, please take this medication as instructed and be sure to complete the entire course.  STOOL SOFTENERS: Do not allow yourself to become constipated as straining may cause bleeding. Take stool softeners or a laxative (ex. Miralax, Colace, Senokot, ExLax, etc), available over the counter, if needed.  ACTIVITY: No heavy lifting or strenuous exercise until you are evaluated at your post-operative appointment. Otherwise, you may return to your usual level of physical activity.  FOLLOW-UP: If you did not already schedule your post-operative appointment, please call your urologist to schedule follow-up appointment on Monday 5/22  CALL YOUR UROLOGIST IF: You have any bleeding that does not stop, inability to void >8 hours, fever over 100.4 F, chills, persistent nausea/vomiting, changes in your incision concerning for infection, or if your pain is not controlled on your discharge pain medications.

## 2023-05-18 NOTE — PROGRESS NOTE ADULT - ASSESSMENT
A/P: 88 year old male s/p TURBT, right stent exchange POD#1    - AM labs  - Diet  - pain control prn  - OOB/ambulate  - Encourage incentive spirometry  - Rebollar to gravity, monitor color  - CBI clamped  - DVT ppx  - Dispo planning home with charan
A/P: 88y Male s/p TURBT, R stent exchange   DVT prophylaxis/OOB  Incentive spirometry  Strict I&O's  Analgesia and antiemetics as needed  Diet-regular  Wean CBI as tolerated   Home with farrar 
(2) very limited

## 2023-05-18 NOTE — ASU DISCHARGE PLAN (ADULT/PEDIATRIC) - CALL YOUR DOCTOR IF YOU HAVE ANY OF THE FOLLOWING:
farrar not draining/Bleeding that does not stop/Pain not relieved by Medications/Fever greater than (need to indicate Fahrenheit or Celsius)/Nausea and vomiting that does not stop

## 2023-05-18 NOTE — ASU DISCHARGE PLAN (ADULT/PEDIATRIC) - PAIN MANAGEMENT
Take over the counter pain medication Opioid Counseling: I discussed with the patient the potential side effects of opioids including but not limited to addiction, altered mental status, and depression. I stressed avoiding alcohol, benzodiazepines, muscle relaxants and sleep aids unless specifically okayed by a physician. The patient verbalized understanding of the proper use and possible adverse effects of opioids. All of the patient's questions and concerns were addressed. They were instructed to flush the remaining pills down the toilet if they did not need them for pain.

## 2023-05-18 NOTE — ASU DISCHARGE PLAN (ADULT/PEDIATRIC) - CARE PROVIDER_API CALL
Dong Billingsley)  Urology  [address] [facility], [address] [place] [address]  Phone: [phone]  Fax: [phone]  Follow Up Time:

## 2023-05-22 ENCOUNTER — NON-APPOINTMENT (OUTPATIENT)
Age: 88
End: 2023-05-22

## 2023-05-22 ENCOUNTER — APPOINTMENT (OUTPATIENT)
Dept: UROLOGY | Facility: CLINIC | Age: 88
End: 2023-05-22
Payer: MEDICARE

## 2023-05-22 VITALS
OXYGEN SATURATION: 98 % | SYSTOLIC BLOOD PRESSURE: 114 MMHG | HEART RATE: 91 BPM | DIASTOLIC BLOOD PRESSURE: 78 MMHG | TEMPERATURE: 98 F

## 2023-05-22 PROCEDURE — 51700 IRRIGATION OF BLADDER: CPT

## 2023-05-22 PROCEDURE — A4216: CPT | Mod: NC

## 2023-05-25 LAB — SURGICAL PATHOLOGY STUDY: SIGNIFICANT CHANGE UP

## 2023-05-30 ENCOUNTER — APPOINTMENT (OUTPATIENT)
Dept: UROLOGY | Facility: CLINIC | Age: 88
End: 2023-05-30
Payer: MEDICARE

## 2023-05-30 VITALS
TEMPERATURE: 97.7 F | WEIGHT: 234 LBS | HEART RATE: 83 BPM | BODY MASS INDEX: 29.25 KG/M2 | SYSTOLIC BLOOD PRESSURE: 135 MMHG | DIASTOLIC BLOOD PRESSURE: 90 MMHG | OXYGEN SATURATION: 96 %

## 2023-05-30 PROCEDURE — 52310 CYSTOSCOPY AND TREATMENT: CPT

## 2023-05-30 PROCEDURE — 99214 OFFICE O/P EST MOD 30 MIN: CPT | Mod: 25

## 2023-05-30 RX ORDER — SULFAMETHOXAZOLE AND TRIMETHOPRIM 800; 160 MG/1; MG/1
800-160 TABLET ORAL TWICE DAILY
Qty: 2 | Refills: 0 | Status: COMPLETED | COMMUNITY
Start: 2023-05-26 | End: 2023-05-30

## 2023-05-30 RX ORDER — SULFAMETHOXAZOLE AND TRIMETHOPRIM 800; 160 MG/1; MG/1
800-160 TABLET ORAL
Refills: 0 | Status: COMPLETED | OUTPATIENT
Start: 2023-05-30

## 2023-05-30 RX ADMIN — SULFAMETHOXAZOLE AND TRIMETHOPRIM 0 MG: 800; 160 TABLET ORAL at 00:00

## 2023-06-07 ENCOUNTER — APPOINTMENT (OUTPATIENT)
Dept: CARDIOLOGY | Facility: CLINIC | Age: 88
End: 2023-06-07

## 2023-06-11 NOTE — END OF VISIT
[FreeTextEntry3] : All medical record entries made by the scribe today, were at my direction and personally dictated to them by me, Dr. Dong Billingsley on 05/22/2023. I have reviewed the chart and agree that the record accurately reflects my personal performance of the history, physical exam, assessment, and plan. I have also personally directed, reviewed, and agreed with the chart.\par

## 2023-06-11 NOTE — ASSESSMENT
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

## 2023-06-11 NOTE — HISTORY OF PRESENT ILLNESS
[FreeTextEntry1] : 89 y/o male here for TOV and following up s/p TURBT w bladder tumor biopsy and R ureteral stent exchange\par \par findings- 3 cm papillary tumor R lateral wall of bladder, 2 cm bladder tumor on posterior bladder wall\par \par awaiting biopsy pathology report\par \par pathology results 4/12/2023-\par high grade urothelial carcinoma\par papillary growth\par muscularis propria present, not involved\par local invasion lamina ?\par \par is taking tamsulosin 0.4 mg\par \par no complaints urinating,\par denies hematuria and dysuria\par has a catheter\par

## 2023-06-11 NOTE — ASSESSMENT
[FreeTextEntry1] : 89 y/o male following up s/p TURBT w bladder tumor biopsy and R ureteral stent exchange\par \par TOV today-\par pt tolerated well, no complications\par \par fully empties bladder,\par PVR today: 0\par 4/18/2023, 0\par \par continue w tamsulosin 0.4 mg\par \par d/w pt and his wife possible future plan, ie BCG\par \par later in 1 week for stent removal, path discussion, and further plan \par \par

## 2023-06-11 NOTE — ADDENDUM
[FreeTextEntry1] : I, Flaquita Luevano, solely acted as scribe for Dr. Dong Billingsley on 05/30/2023.\par

## 2023-06-11 NOTE — ADDENDUM
[FreeTextEntry1] : I, Flaquita Luevano, solely acted as scribe for Dr. Dong Billingsley on 05/22/2023.\par

## 2023-06-11 NOTE — END OF VISIT
[FreeTextEntry3] : All medical record entries made by the scribe today, were at my direction and personally dictated to them by me, Dr. Dong Billingsley on 05/30/2023. I have reviewed the chart and agree that the record accurately reflects my personal performance of the history, physical exam, assessment, and plan. I have also personally directed, reviewed, and agreed with the chart.\par

## 2023-06-11 NOTE — HISTORY OF PRESENT ILLNESS
[FreeTextEntry1] : 89 y/o male here for discussion of bladder tumor biopsy results and cystoscopy for stent removal\par \par TUR for bladder tumor biopsy 5/17/2023,\par reviewed path results w pt and wife today,\par bladder site of prior bladder tumor- benign urothelial tissue w extensive post biopsy changes\par muscularis propria detrusor muscle is present\par bladder posterior wall tumor- non invasive urothelial carcinoma, papillary, high grade, muscular propria detrusor muscle present and not involved, no identified lymphovascular invasion\par \par s/p TURBT w bladder tumor biopsy and R ureteral stent exchange 4/12/2023\par \par L renal mass-\par 4/25/2023 abd and pelvis MRI-\par 5.8 cm enhancing L renal mass (in 2020 was 4.8 cm), ? renal cell carcinoma, no vasc invasion or metastatic disease\par 5.5 cm R bladder mass w transmural invasion, no concurrent urothelial lesion\par \par is taking tamsulosin 0.4 mg,\par no complaints urinating\par denies hematuria and dysuria\par \par \par

## 2023-06-12 ENCOUNTER — APPOINTMENT (OUTPATIENT)
Dept: UROLOGY | Facility: CLINIC | Age: 88
End: 2023-06-12
Payer: MEDICARE

## 2023-06-12 VITALS
HEART RATE: 97 BPM | OXYGEN SATURATION: 97 % | SYSTOLIC BLOOD PRESSURE: 122 MMHG | TEMPERATURE: 97.1 F | DIASTOLIC BLOOD PRESSURE: 75 MMHG

## 2023-06-12 PROCEDURE — 81003 URINALYSIS AUTO W/O SCOPE: CPT | Mod: QW

## 2023-06-12 PROCEDURE — 99212 OFFICE O/P EST SF 10 MIN: CPT

## 2023-06-19 NOTE — ASSESSMENT
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

## 2023-06-19 NOTE — END OF VISIT
[FreeTextEntry3] : All medical record entries made by the scribe today, were at my direction and personally dictated to them by me, Dr. Dong Billingsley on 06/12/2023. I have reviewed the chart and agree that the record accurately reflects my personal performance of the history, physical exam, assessment, and plan. I have also personally directed, reviewed, and agreed with the chart.

## 2023-06-19 NOTE — HISTORY OF PRESENT ILLNESS
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\par \par here for 1st of 6 week BCG treatment course, w prob 2 maintenance courses if available\par \par TUR for bladder tumor biopsy w R ureteral stent placement 5/17/2023,\par staging TUR of  site of prior bladder tumor- benign urothelial tissue w extensive post biopsy changes\par muscularis propria detrusor muscle is present\par bladder posterior wall tumor- non invasive urothelial carcinoma, papillary, high grade, muscular propria detrusor muscle present and not involved, no identified lymphovascular invasion\par \par cystoscopy for R ureteral stent removal 5/30/2023\par \par L renal mass-\par 4/25/2023 abd and pelvis MRI-\par 5.8 cm enhancing L renal mass (in 2020 was 4.8 cm), ? renal cell carcinoma, no vasc invasion or metastatic disease\par 5.5 cm R bladder mass w transmural invasion, no concurrent urothelial lesion\par \par is taking tamsulosin 0.4 mg\par no complaints urinating\par denies hematuria and dysuria\par

## 2023-06-19 NOTE — ADDENDUM
[FreeTextEntry1] : I, Flaquita Luevano, solely acted as scribe for Dr. Dong Billingsley on 06/12/2023.

## 2023-06-23 LAB
HGB FLD-MCNC: 14.6 G/DL — SIGNIFICANT CHANGE UP (ref 12.6–17.4)
OXYHGB MFR BLDMV: 66.4 % — LOW (ref 90–95)
SAO2 % BLD: 67.5 % — SIGNIFICANT CHANGE UP (ref 60–90)

## 2023-06-26 ENCOUNTER — APPOINTMENT (OUTPATIENT)
Dept: UROLOGY | Facility: CLINIC | Age: 88
End: 2023-06-26
Payer: MEDICARE

## 2023-06-26 ENCOUNTER — RESULT CHARGE (OUTPATIENT)
Age: 88
End: 2023-06-26

## 2023-06-26 VITALS
OXYGEN SATURATION: 96 % | TEMPERATURE: 97.1 F | DIASTOLIC BLOOD PRESSURE: 68 MMHG | SYSTOLIC BLOOD PRESSURE: 123 MMHG | HEART RATE: 94 BPM

## 2023-06-26 DIAGNOSIS — N28.89 OTHER SPECIFIED DISORDERS OF KIDNEY AND URETER: ICD-10-CM

## 2023-06-26 PROCEDURE — 81003 URINALYSIS AUTO W/O SCOPE: CPT | Mod: QW

## 2023-06-26 PROCEDURE — 51700 IRRIGATION OF BLADDER: CPT

## 2023-06-26 PROCEDURE — 99212 OFFICE O/P EST SF 10 MIN: CPT | Mod: 25

## 2023-06-26 RX ORDER — BACILLUS CALMETTE-GUERIN 50 MG/50ML
50 POWDER, FOR SUSPENSION INTRAVESICAL
Refills: 0 | Status: COMPLETED | OUTPATIENT
Start: 2023-06-26

## 2023-06-26 RX ORDER — BACILLUS CALMETTE-GUERIN 50 MG/50ML
50 POWDER, FOR SUSPENSION INTRAVESICAL
Qty: 1 | Refills: 0 | Status: COMPLETED | COMMUNITY
Start: 2023-06-23 | End: 2023-06-26

## 2023-06-26 RX ADMIN — BACILLUS CALMETTE-GUERIN 0 MG: 50 POWDER, FOR SUSPENSION INTRAVESICAL at 00:00

## 2023-06-26 NOTE — END OF VISIT
[FreeTextEntry3] : All medical record entries made by the scribe today, were at my direction and personally dictated to them by me, Dr. Dong Billingsley on 06/26/2023. I have reviewed the chart and agree that the record accurately reflects my personal performance of the history, physical exam, assessment, and plan. I have also personally directed, reviewed, and agreed with the chart.

## 2023-06-26 NOTE — HISTORY OF PRESENT ILLNESS
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 renal cell carcinoma, no vasc invasion or metastatic disease\par 5.5 cm R bladder mass w transmural invasion, no concurrent urothelial lesion\par \par is taking tamsulosin 0.4 mg\par no complaints urinating\par denies hematuria and dysuria\par

## 2023-06-26 NOTE — PHYSICAL EXAM
[General Appearance - Well Developed] : well developed [General Appearance - Well Nourished] : well nourished [Normal Appearance] : normal appearance [Well Groomed] : well groomed [General Appearance - In No Acute Distress] : no acute distress [Abdomen Soft] : soft [Abdomen Tenderness] : non-tender [Costovertebral Angle Tenderness] : no ~M costovertebral angle tenderness [Penis Abnormality] : normal uncircumcised penis

## 2023-06-26 NOTE — ADDENDUM
[FreeTextEntry1] : I, Flaquita Luevano, solely acted as scribe for Dr. Dong Billingsley on 06/26/2023.

## 2023-06-26 NOTE — ASSESSMENT
[FreeTextEntry1] : 87 y/o male w high grade papillary urothelial carcinoma- focal T1/ Ta\par \par \par \par has no voiding complaints\par \par successfully received 1st of 6 week BCG treatment course,\par pt tolerated well w/o complications\par \par f/u next week w Dr Kirby for 2nd of 6 week BCG treatment course and to begin discussion re L renal mass- prob partial vs radical nx- will ultimately require anticoag +/- TAVR

## 2023-07-03 ENCOUNTER — APPOINTMENT (OUTPATIENT)
Dept: UROLOGY | Facility: CLINIC | Age: 88
End: 2023-07-03
Payer: MEDICARE

## 2023-07-03 VITALS
DIASTOLIC BLOOD PRESSURE: 79 MMHG | HEART RATE: 98 BPM | SYSTOLIC BLOOD PRESSURE: 134 MMHG | TEMPERATURE: 97.9 F | OXYGEN SATURATION: 97 %

## 2023-07-03 PROCEDURE — 51700 IRRIGATION OF BLADDER: CPT

## 2023-07-03 RX ORDER — BACILLUS CALMETTE-GUERIN 50 MG/50ML
50 POWDER, FOR SUSPENSION INTRAVESICAL
Refills: 0 | Status: COMPLETED | OUTPATIENT
Start: 2023-07-03

## 2023-07-03 RX ORDER — BACILLUS CALMETTE-GUERIN 50 MG/50ML
50 POWDER, FOR SUSPENSION INTRAVESICAL
Qty: 1 | Refills: 0 | Status: COMPLETED | COMMUNITY
Start: 2023-06-11 | End: 2023-07-03

## 2023-07-03 RX ADMIN — BACILLUS CALMETTE-GUERIN 0 MG: 50 POWDER, FOR SUSPENSION INTRAVESICAL at 00:00

## 2023-07-04 LAB
BILIRUB UR QL STRIP: NORMAL
GLUCOSE UR-MCNC: NORMAL
HCG UR QL: 0.2 EU/DL
HGB UR QL STRIP.AUTO: NORMAL
KETONES UR-MCNC: NORMAL
LEUKOCYTE ESTERASE UR QL STRIP: NORMAL
NITRITE UR QL STRIP: NORMAL
PH UR STRIP: 6
PROT UR STRIP-MCNC: NORMAL
SP GR UR STRIP: 1.01

## 2023-07-10 ENCOUNTER — APPOINTMENT (OUTPATIENT)
Dept: UROLOGY | Facility: CLINIC | Age: 88
End: 2023-07-10
Payer: MEDICARE

## 2023-07-10 ENCOUNTER — RESULT CHARGE (OUTPATIENT)
Age: 88
End: 2023-07-10

## 2023-07-10 VITALS
DIASTOLIC BLOOD PRESSURE: 74 MMHG | OXYGEN SATURATION: 97 % | WEIGHT: 234 LBS | HEART RATE: 89 BPM | SYSTOLIC BLOOD PRESSURE: 144 MMHG | BODY MASS INDEX: 29.09 KG/M2 | HEIGHT: 75 IN | TEMPERATURE: 97.9 F

## 2023-07-10 PROCEDURE — 51720 TREATMENT OF BLADDER LESION: CPT

## 2023-07-10 RX ORDER — BACILLUS CALMETTE-GUERIN 50 MG/50ML
50 POWDER, FOR SUSPENSION INTRAVESICAL
Qty: 1 | Refills: 0 | Status: COMPLETED | COMMUNITY
Start: 2023-07-10 | End: 2023-07-10

## 2023-07-10 RX ORDER — BACILLUS CALMETTE-GUERIN 50 MG/50ML
50 POWDER, FOR SUSPENSION INTRAVESICAL
Refills: 0 | Status: COMPLETED | OUTPATIENT
Start: 2023-07-10

## 2023-07-10 RX ADMIN — Medication 0 MG: at 00:00

## 2023-07-11 ENCOUNTER — APPOINTMENT (OUTPATIENT)
Dept: UROLOGY | Facility: CLINIC | Age: 88
End: 2023-07-11

## 2023-07-17 ENCOUNTER — APPOINTMENT (OUTPATIENT)
Dept: UROLOGY | Facility: CLINIC | Age: 88
End: 2023-07-17
Payer: MEDICARE

## 2023-07-17 VITALS
TEMPERATURE: 97.8 F | HEART RATE: 76 BPM | SYSTOLIC BLOOD PRESSURE: 154 MMHG | DIASTOLIC BLOOD PRESSURE: 77 MMHG | OXYGEN SATURATION: 97 %

## 2023-07-17 RX ORDER — BACILLUS CALMETTE-GUERIN 50 MG/50ML
50 POWDER, FOR SUSPENSION INTRAVESICAL
Refills: 0 | Status: COMPLETED | OUTPATIENT
Start: 2023-07-17

## 2023-07-17 RX ADMIN — Medication 0 MG: at 00:00

## 2023-07-18 ENCOUNTER — APPOINTMENT (OUTPATIENT)
Dept: UROLOGY | Facility: CLINIC | Age: 88
End: 2023-07-18

## 2023-07-22 LAB
BILIRUB UR QL STRIP: NORMAL
CLARITY UR: CLEAR
COLLECTION METHOD: NORMAL
GLUCOSE UR-MCNC: NORMAL
HCG UR QL: 0.2 EU/DL
HGB UR QL STRIP.AUTO: NORMAL
KETONES UR-MCNC: NORMAL
LEUKOCYTE ESTERASE UR QL STRIP: NORMAL
NITRITE UR QL STRIP: NORMAL
PH UR STRIP: 5.5
PROT UR STRIP-MCNC: NORMAL
SP GR UR STRIP: 1.02

## 2023-07-24 ENCOUNTER — APPOINTMENT (OUTPATIENT)
Dept: UROLOGY | Facility: CLINIC | Age: 88
End: 2023-07-24
Payer: MEDICARE

## 2023-07-24 ENCOUNTER — RESULT CHARGE (OUTPATIENT)
Age: 88
End: 2023-07-24

## 2023-07-24 VITALS
HEART RATE: 91 BPM | TEMPERATURE: 97.7 F | OXYGEN SATURATION: 99 % | DIASTOLIC BLOOD PRESSURE: 83 MMHG | SYSTOLIC BLOOD PRESSURE: 138 MMHG

## 2023-07-24 DIAGNOSIS — C67.9 MALIGNANT NEOPLASM OF BLADDER, UNSPECIFIED: ICD-10-CM

## 2023-07-25 RX ORDER — BACILLUS CALMETTE-GUERIN 50 MG/50ML
50 POWDER, FOR SUSPENSION INTRAVESICAL
Refills: 0 | Status: COMPLETED | OUTPATIENT
Start: 2023-07-25

## 2023-07-25 RX ADMIN — Medication 0 MG: at 00:00

## 2023-07-31 ENCOUNTER — APPOINTMENT (OUTPATIENT)
Dept: UROLOGY | Facility: CLINIC | Age: 88
End: 2023-07-31
Payer: MEDICARE

## 2023-07-31 VITALS
OXYGEN SATURATION: 96 % | HEIGHT: 75 IN | SYSTOLIC BLOOD PRESSURE: 135 MMHG | WEIGHT: 234 LBS | TEMPERATURE: 97.2 F | BODY MASS INDEX: 29.09 KG/M2 | HEART RATE: 87 BPM | DIASTOLIC BLOOD PRESSURE: 83 MMHG

## 2023-07-31 DIAGNOSIS — C67.9 MALIGNANT NEOPLASM OF BLADDER, UNSPECIFIED: ICD-10-CM

## 2023-07-31 NOTE — CARDIOLOGY SUMMARY
[de-identified] : 4/7/23 LVEF 63%, ABBY 1.1sqcm, mGr 18mmHg, pGr 32 mmHg, AoV 2.8m/s, moderate MR, moderate TR.  [de-identified] : 4/10/23 Structural CT completed, please see scanned report for details  [de-identified] : 4/11/23 LAD normal, LAD D2 80%, mLAD 50%, OM1 60%, mRCA 20%, pRCA 30%

## 2023-07-31 NOTE — REASON FOR VISIT
[Structural Heart and Valve Disease] : structural heart and valve disease [Other: ____] : [unfilled] [FreeTextEntry3] : Dr. Mccollum

## 2023-07-31 NOTE — HISTORY OF PRESENT ILLNESS
[FreeTextEntry1] : Mr. Hammonds returns to clinic today for follow up eavluation of aortic stenosis. He was seen last in April at which time his AS was moderate and not yet warranting intervention. Since his last visit he reports  To review his past medical history includes AFib not on anticoagulation, Left renal mass, and bladder cancer awaiting surgery.   Repeat echocardiogram today demonstrates

## 2023-08-02 RX ORDER — BACILLUS CALMETTE-GUERIN 50 MG/50ML
50 POWDER, FOR SUSPENSION INTRAVESICAL
Refills: 0 | Status: COMPLETED | OUTPATIENT
Start: 2023-08-02

## 2023-08-02 RX ORDER — BACILLUS CALMETTE-GUERIN 50 MG/50ML
50 POWDER, FOR SUSPENSION INTRAVESICAL
Qty: 1 | Refills: 0 | Status: COMPLETED | COMMUNITY
Start: 2023-07-17 | End: 2023-07-31

## 2023-08-02 RX ADMIN — Medication 0 MG: at 00:00

## 2023-08-03 ENCOUNTER — APPOINTMENT (OUTPATIENT)
Dept: CARDIOLOGY | Facility: CLINIC | Age: 88
End: 2023-08-03

## 2023-08-11 LAB
BILIRUB UR QL STRIP: NORMAL
CLARITY UR: CLEAR
COLLECTION METHOD: NORMAL
GLUCOSE UR-MCNC: NORMAL
HCG UR QL: 0.2 EU/DL
HGB UR QL STRIP.AUTO: NORMAL
KETONES UR-MCNC: NORMAL
LEUKOCYTE ESTERASE UR QL STRIP: NORMAL
NITRITE UR QL STRIP: NORMAL
PH UR STRIP: 5
PROT UR STRIP-MCNC: NORMAL
SP GR UR STRIP: 1.02

## 2023-08-24 NOTE — PHYSICAL THERAPY INITIAL EVALUATION ADULT - STRENGTHENING, PT EVAL
Is well controlled, continue with current medications.    Pt will improve muscle strength in all extremities to WFL in 1 to 2 weeks to perform Gait & ADL independently

## 2023-09-11 NOTE — PRE-OP CHECKLIST - SURGICAL CONSENT
Patient called to cancel appointment on 9/11 due to conflict with day/time. She is rescheduled to 9/13.    done

## 2023-12-25 NOTE — PHYSICAL THERAPY INITIAL EVALUATION ADULT - PATIENT/FAMILY AGREES WITH PLAN
[facility]  CARDIOLOGY [address] [place] [address]   Scheduled Appointment: 12/26/2023    Martin Louie  [facility]  CARDIOLOGY [address] [place] [address]   Scheduled Appointment: 02/16/2024     [facility]  CARDIOLOGY [address] [place] [address]   Scheduled Appointment: 12/26/2023    Martin Louie  [facility]  CARDIOLOGY [address] [place] [address]   Scheduled Appointment: 02/16/2024     Home w/ home PT/yes

## 2024-01-17 NOTE — ED PROVIDER NOTE - CCCP TRG CHIEF CMPLNT
Pharmacy requesting new Rx for Ozempic. Pharmacy states they have to start off with 2mg/3ml and will need a new Rx. Pharmacy they can be reached @ [phone] for questions.  
[facility] pharmacy called to inform you that Ozempic only comes in 3 ml. Pharmacy requesting new script.  
aortic stenosis

## 2024-08-07 ENCOUNTER — APPOINTMENT (OUTPATIENT)
Dept: INTERNAL MEDICINE | Facility: CLINIC | Age: 89
End: 2024-08-07

## 2024-08-07 PROBLEM — H61.23 BILATERAL IMPACTED CERUMEN: Status: ACTIVE | Noted: 2024-08-07

## 2024-08-07 PROCEDURE — G2211 COMPLEX E/M VISIT ADD ON: CPT

## 2024-08-07 PROCEDURE — 99213 OFFICE O/P EST LOW 20 MIN: CPT

## 2024-08-07 NOTE — HEALTH RISK ASSESSMENT
[No] : In the past 12 months have you used drugs other than those required for medical reasons? No [No falls in past year] : Patient reported no falls in the past year [0] : 2) Feeling down, depressed, or hopeless: Not at all (0) [de-identified] : No [de-identified] : No [de-identified] : Doing a little exercises at home  [de-identified] : Healthy [de-identified] : No

## 2024-08-07 NOTE — HISTORY OF PRESENT ILLNESS
[FreeTextEntry8] : patient states that he put a qtip in his ear a few days ago and now cannot hear out of this ear. right ear

## 2024-08-07 NOTE — PHYSICAL EXAM
[No Acute Distress] : no acute distress [Well Nourished] : well nourished [Well Developed] : well developed [Well-Appearing] : well-appearing [Normal Sclera/Conjunctiva] : normal sclera/conjunctiva [PERRL] : pupils equal round and reactive to light [EOMI] : extraocular movements intact [Normal Outer Ear/Nose] : the outer ears and nose were normal in appearance [Normal Oropharynx] : the oropharynx was normal [No JVD] : no jugular venous distention [No Lymphadenopathy] : no lymphadenopathy [Supple] : supple [Thyroid Normal, No Nodules] : the thyroid was normal and there were no nodules present [No Respiratory Distress] : no respiratory distress  [No Accessory Muscle Use] : no accessory muscle use [Clear to Auscultation] : lungs were clear to auscultation bilaterally [Normal Rate] : normal rate  [Regular Rhythm] : with a regular rhythm [Normal S1, S2] : normal S1 and S2 [No Murmur] : no murmur heard [No Carotid Bruits] : no carotid bruits [No Abdominal Bruit] : a ~M bruit was not heard ~T in the abdomen [No Varicosities] : no varicosities [Pedal Pulses Present] : the pedal pulses are present [No Edema] : there was no peripheral edema [No Palpable Aorta] : no palpable aorta [No Extremity Clubbing/Cyanosis] : no extremity clubbing/cyanosis [Soft] : abdomen soft [Non Tender] : non-tender [Non-distended] : non-distended [No Masses] : no abdominal mass palpated [No HSM] : no HSM [Normal Bowel Sounds] : normal bowel sounds [Normal Posterior Cervical Nodes] : no posterior cervical lymphadenopathy [Normal Anterior Cervical Nodes] : no anterior cervical lymphadenopathy [No CVA Tenderness] : no CVA  tenderness [No Spinal Tenderness] : no spinal tenderness [No Joint Swelling] : no joint swelling [Grossly Normal Strength/Tone] : grossly normal strength/tone [No Rash] : no rash [Coordination Grossly Intact] : coordination grossly intact [No Focal Deficits] : no focal deficits [Normal Gait] : normal gait [Deep Tendon Reflexes (DTR)] : deep tendon reflexes were 2+ and symmetric [Normal Affect] : the affect was normal [Normal Insight/Judgement] : insight and judgment were intact [de-identified] : impacted cerumen

## 2024-08-07 NOTE — PHYSICAL EXAM
[No Acute Distress] : no acute distress [Well Nourished] : well nourished [Well Developed] : well developed [Well-Appearing] : well-appearing [Normal Sclera/Conjunctiva] : normal sclera/conjunctiva [PERRL] : pupils equal round and reactive to light [EOMI] : extraocular movements intact [Normal Outer Ear/Nose] : the outer ears and nose were normal in appearance [Normal Oropharynx] : the oropharynx was normal [No JVD] : no jugular venous distention [No Lymphadenopathy] : no lymphadenopathy [Supple] : supple [Thyroid Normal, No Nodules] : the thyroid was normal and there were no nodules present [No Respiratory Distress] : no respiratory distress  [No Accessory Muscle Use] : no accessory muscle use [Clear to Auscultation] : lungs were clear to auscultation bilaterally [Normal Rate] : normal rate  [Regular Rhythm] : with a regular rhythm [Normal S1, S2] : normal S1 and S2 [No Murmur] : no murmur heard [No Carotid Bruits] : no carotid bruits [No Abdominal Bruit] : a ~M bruit was not heard ~T in the abdomen [No Varicosities] : no varicosities [Pedal Pulses Present] : the pedal pulses are present [No Edema] : there was no peripheral edema [No Palpable Aorta] : no palpable aorta [No Extremity Clubbing/Cyanosis] : no extremity clubbing/cyanosis [Soft] : abdomen soft [Non Tender] : non-tender [Non-distended] : non-distended [No Masses] : no abdominal mass palpated [No HSM] : no HSM [Normal Bowel Sounds] : normal bowel sounds [Normal Posterior Cervical Nodes] : no posterior cervical lymphadenopathy [Normal Anterior Cervical Nodes] : no anterior cervical lymphadenopathy [No CVA Tenderness] : no CVA  tenderness [No Spinal Tenderness] : no spinal tenderness [No Joint Swelling] : no joint swelling [Grossly Normal Strength/Tone] : grossly normal strength/tone [No Rash] : no rash [Coordination Grossly Intact] : coordination grossly intact [No Focal Deficits] : no focal deficits [Normal Gait] : normal gait [Deep Tendon Reflexes (DTR)] : deep tendon reflexes were 2+ and symmetric [Normal Affect] : the affect was normal [Normal Insight/Judgement] : insight and judgment were intact [de-identified] : impacted cerumen

## 2024-08-07 NOTE — HEALTH RISK ASSESSMENT
[No] : In the past 12 months have you used drugs other than those required for medical reasons? No [No falls in past year] : Patient reported no falls in the past year [0] : 2) Feeling down, depressed, or hopeless: Not at all (0) [de-identified] : No [de-identified] : No [de-identified] : Doing a little exercises at home  [de-identified] : Healthy [de-identified] : No

## 2024-09-20 ENCOUNTER — APPOINTMENT (OUTPATIENT)
Dept: INTERNAL MEDICINE | Facility: CLINIC | Age: 89
End: 2024-09-20
Payer: MEDICARE

## 2024-09-20 ENCOUNTER — LABORATORY RESULT (OUTPATIENT)
Age: 89
End: 2024-09-20

## 2024-09-20 VITALS
OXYGEN SATURATION: 99 % | HEIGHT: 75 IN | DIASTOLIC BLOOD PRESSURE: 91 MMHG | SYSTOLIC BLOOD PRESSURE: 138 MMHG | TEMPERATURE: 98.1 F | HEART RATE: 84 BPM

## 2024-09-20 DIAGNOSIS — R60.0 LOCALIZED EDEMA: ICD-10-CM

## 2024-09-20 DIAGNOSIS — L03.116 CELLULITIS OF LEFT LOWER LIMB: ICD-10-CM

## 2024-09-20 PROCEDURE — 99214 OFFICE O/P EST MOD 30 MIN: CPT

## 2024-09-20 PROCEDURE — G2211 COMPLEX E/M VISIT ADD ON: CPT

## 2024-09-20 PROCEDURE — 36415 COLL VENOUS BLD VENIPUNCTURE: CPT

## 2024-09-20 RX ORDER — CEPHALEXIN 500 MG/1
500 TABLET ORAL
Qty: 20 | Refills: 0 | Status: ACTIVE | COMMUNITY
Start: 2024-09-20 | End: 1900-01-01

## 2024-09-20 RX ORDER — FUROSEMIDE 20 MG/1
20 TABLET ORAL
Qty: 90 | Refills: 1 | Status: ACTIVE | COMMUNITY
Start: 2024-09-20 | End: 1900-01-01

## 2024-09-20 NOTE — REVIEW OF SYSTEMS
[Lower Ext Edema] : lower extremity edema [Negative] : Heme/Lymph [de-identified] : weeping wound on leg

## 2024-09-20 NOTE — PHYSICAL EXAM
[No Acute Distress] : no acute distress [Well Nourished] : well nourished [Well Developed] : well developed [Well-Appearing] : well-appearing [Normal Sclera/Conjunctiva] : normal sclera/conjunctiva [PERRL] : pupils equal round and reactive to light [EOMI] : extraocular movements intact [Normal Outer Ear/Nose] : the outer ears and nose were normal in appearance [Normal Oropharynx] : the oropharynx was normal [No JVD] : no jugular venous distention [No Lymphadenopathy] : no lymphadenopathy [Supple] : supple [Thyroid Normal, No Nodules] : the thyroid was normal and there were no nodules present [No Respiratory Distress] : no respiratory distress  [No Accessory Muscle Use] : no accessory muscle use [Clear to Auscultation] : lungs were clear to auscultation bilaterally [Normal Rate] : normal rate  [Regular Rhythm] : with a regular rhythm [Normal S1, S2] : normal S1 and S2 [No Murmur] : no murmur heard [No Carotid Bruits] : no carotid bruits [No Abdominal Bruit] : a ~M bruit was not heard ~T in the abdomen [No Varicosities] : no varicosities [Pedal Pulses Present] : the pedal pulses are present [No Palpable Aorta] : no palpable aorta [No Extremity Clubbing/Cyanosis] : no extremity clubbing/cyanosis [Soft] : abdomen soft [Non Tender] : non-tender [Non-distended] : non-distended [No Masses] : no abdominal mass palpated [No HSM] : no HSM [Normal Bowel Sounds] : normal bowel sounds [Normal Posterior Cervical Nodes] : no posterior cervical lymphadenopathy [Normal Anterior Cervical Nodes] : no anterior cervical lymphadenopathy [No CVA Tenderness] : no CVA  tenderness [No Spinal Tenderness] : no spinal tenderness [No Joint Swelling] : no joint swelling [Grossly Normal Strength/Tone] : grossly normal strength/tone [No Rash] : no rash [Coordination Grossly Intact] : coordination grossly intact [No Focal Deficits] : no focal deficits [Normal Gait] : normal gait [Deep Tendon Reflexes (DTR)] : deep tendon reflexes were 2+ and symmetric [Normal Affect] : the affect was normal [Normal Insight/Judgement] : insight and judgment were intact [de-identified] : + edema and redness alog scabbed wound

## 2024-09-20 NOTE — HEALTH RISK ASSESSMENT
[No] : No [Two or more falls in past year] : Patient reported two or more falls in the past year [0] : 2) Feeling down, depressed, or hopeless: Not at all (0) [Never] : Never

## 2024-09-20 NOTE — ASSESSMENT
[FreeTextEntry1] : Patient c/o of left leg swelling, weeping and an open wound that is now scabbing up  edema and cellulitis- will restart water pill Keflex   Blood work drawn in office today

## 2024-09-20 NOTE — HISTORY OF PRESENT ILLNESS
[FreeTextEntry8] : Patient c/o of left leg swelling, weeping and an open wound that is now scabbing up

## 2024-09-23 LAB
ALBUMIN SERPL ELPH-MCNC: 4 G/DL
ALP BLD-CCNC: 89 U/L
ALT SERPL-CCNC: 19 U/L
ANION GAP SERPL CALC-SCNC: 14 MMOL/L
APPEARANCE: CLEAR
AST SERPL-CCNC: 24 U/L
BASOPHILS # BLD AUTO: 0.02 K/UL
BASOPHILS NFR BLD AUTO: 0.4 %
BILIRUB SERPL-MCNC: 0.8 MG/DL
BILIRUBIN URINE: NEGATIVE
BLOOD URINE: ABNORMAL
BUN SERPL-MCNC: 34 MG/DL
CALCIUM SERPL-MCNC: 9.5 MG/DL
CHLORIDE SERPL-SCNC: 104 MMOL/L
CHOLEST SERPL-MCNC: 156 MG/DL
CO2 SERPL-SCNC: 20 MMOL/L
COLOR: YELLOW
CREAT SERPL-MCNC: 1.24 MG/DL
DEPRECATED D DIMER PPP IA-ACNC: 503 NG/ML DDU
EGFR: 56 ML/MIN/1.73M2
EOSINOPHIL # BLD AUTO: 0.05 K/UL
EOSINOPHIL NFR BLD AUTO: 0.9 %
ESTIMATED AVERAGE GLUCOSE: 100 MG/DL
FERRITIN SERPL-MCNC: 281 NG/ML
FOLATE SERPL-MCNC: 13.1 NG/ML
GLUCOSE QUALITATIVE U: NEGATIVE MG/DL
GLUCOSE SERPL-MCNC: 95 MG/DL
HBA1C MFR BLD HPLC: 5.1 %
HCT VFR BLD CALC: 41.3 %
HDLC SERPL-MCNC: 53 MG/DL
HGB BLD-MCNC: 13.2 G/DL
IMM GRANULOCYTES NFR BLD AUTO: 0.2 %
IRON SATN MFR SERPL: 32 %
IRON SERPL-MCNC: 81 UG/DL
KETONES URINE: NEGATIVE MG/DL
LDLC SERPL CALC-MCNC: 93 MG/DL
LEUKOCYTE ESTERASE URINE: ABNORMAL
LYMPHOCYTES # BLD AUTO: 0.89 K/UL
LYMPHOCYTES NFR BLD AUTO: 16.6 %
MAN DIFF?: NORMAL
MCHC RBC-ENTMCNC: 32 GM/DL
MCHC RBC-ENTMCNC: 32 PG
MCV RBC AUTO: 100.2 FL
MONOCYTES # BLD AUTO: 0.6 K/UL
MONOCYTES NFR BLD AUTO: 11.2 %
NEUTROPHILS # BLD AUTO: 3.8 K/UL
NEUTROPHILS NFR BLD AUTO: 70.7 %
NITRITE URINE: POSITIVE
NONHDLC SERPL-MCNC: 103 MG/DL
PH URINE: 6
PLATELET # BLD AUTO: 190 K/UL
POTASSIUM SERPL-SCNC: 4.4 MMOL/L
PROT SERPL-MCNC: 7.1 G/DL
PROTEIN URINE: 30 MG/DL
RBC # BLD: 4.12 M/UL
RBC # FLD: 13.9 %
SODIUM SERPL-SCNC: 138 MMOL/L
SPECIFIC GRAVITY URINE: 1.02
TIBC SERPL-MCNC: 250 UG/DL
TRIGL SERPL-MCNC: 48 MG/DL
TSH SERPL-ACNC: 2.92 UIU/ML
UIBC SERPL-MCNC: 170 UG/DL
UROBILINOGEN URINE: 0.2 MG/DL
VIT B12 SERPL-MCNC: 375 PG/ML
WBC # FLD AUTO: 5.37 K/UL

## 2024-11-22 ENCOUNTER — APPOINTMENT (OUTPATIENT)
Dept: INTERNAL MEDICINE | Facility: CLINIC | Age: 89
End: 2024-11-22
Payer: MEDICARE

## 2024-11-22 VITALS
DIASTOLIC BLOOD PRESSURE: 83 MMHG | HEART RATE: 91 BPM | HEIGHT: 75 IN | OXYGEN SATURATION: 96 % | TEMPERATURE: 98.4 F | SYSTOLIC BLOOD PRESSURE: 121 MMHG

## 2024-11-22 DIAGNOSIS — Z13.228 ENCOUNTER FOR SCREENING FOR OTHER METABOLIC DISORDERS: ICD-10-CM

## 2024-11-22 DIAGNOSIS — I48.11 LONGSTANDING PERSISTENT ATRIAL FI: ICD-10-CM

## 2024-11-22 DIAGNOSIS — C67.9 MALIGNANT NEOPLASM OF BLADDER, UNSPECIFIED: ICD-10-CM

## 2024-11-22 DIAGNOSIS — Z00.00 ENCOUNTER FOR GENERAL ADULT MEDICAL EXAMINATION W/OUT ABNORMAL FINDINGS: ICD-10-CM

## 2024-11-22 DIAGNOSIS — R60.0 LOCALIZED EDEMA: ICD-10-CM

## 2024-11-22 DIAGNOSIS — Z23 ENCOUNTER FOR IMMUNIZATION: ICD-10-CM

## 2024-11-22 PROCEDURE — 90662 IIV NO PRSV INCREASED AG IM: CPT

## 2024-11-22 PROCEDURE — G0439: CPT

## 2024-11-22 PROCEDURE — 36415 COLL VENOUS BLD VENIPUNCTURE: CPT

## 2024-11-22 PROCEDURE — G0008: CPT

## 2024-11-25 LAB
25(OH)D3 SERPL-MCNC: 28.1 NG/ML
ALBUMIN SERPL ELPH-MCNC: 4.4 G/DL
ALP BLD-CCNC: 79 U/L
ALT SERPL-CCNC: 27 U/L
ANION GAP SERPL CALC-SCNC: 13 MMOL/L
AST SERPL-CCNC: 30 U/L
BASOPHILS # BLD AUTO: 0.02 K/UL
BASOPHILS NFR BLD AUTO: 0.4 %
BILIRUB SERPL-MCNC: 0.6 MG/DL
BUN SERPL-MCNC: 32 MG/DL
CALCIUM SERPL-MCNC: 10 MG/DL
CHLORIDE SERPL-SCNC: 105 MMOL/L
CHOLEST SERPL-MCNC: 162 MG/DL
CO2 SERPL-SCNC: 23 MMOL/L
CREAT SERPL-MCNC: 1.33 MG/DL
EGFR: 51 ML/MIN/1.73M2
EOSINOPHIL # BLD AUTO: 0.09 K/UL
EOSINOPHIL NFR BLD AUTO: 1.9 %
ESTIMATED AVERAGE GLUCOSE: 108 MG/DL
FERRITIN SERPL-MCNC: 227 NG/ML
FOLATE SERPL-MCNC: 12.6 NG/ML
GLUCOSE SERPL-MCNC: 93 MG/DL
HBA1C MFR BLD HPLC: 5.4 %
HCT VFR BLD CALC: 45.7 %
HDLC SERPL-MCNC: 58 MG/DL
HGB BLD-MCNC: 14.7 G/DL
IMM GRANULOCYTES NFR BLD AUTO: 0.4 %
IRON SATN MFR SERPL: 19 %
IRON SERPL-MCNC: 53 UG/DL
LDLC SERPL CALC-MCNC: 91 MG/DL
LYMPHOCYTES # BLD AUTO: 1.35 K/UL
LYMPHOCYTES NFR BLD AUTO: 29.2 %
MAN DIFF?: NORMAL
MCHC RBC-ENTMCNC: 31.7 PG
MCHC RBC-ENTMCNC: 32.2 G/DL
MCV RBC AUTO: 98.5 FL
MONOCYTES # BLD AUTO: 0.58 K/UL
MONOCYTES NFR BLD AUTO: 12.5 %
NEUTROPHILS # BLD AUTO: 2.57 K/UL
NEUTROPHILS NFR BLD AUTO: 55.6 %
NONHDLC SERPL-MCNC: 104 MG/DL
NT-PROBNP SERPL-MCNC: 2019 PG/ML
PLATELET # BLD AUTO: 186 K/UL
POTASSIUM SERPL-SCNC: 4.4 MMOL/L
PROT SERPL-MCNC: 7.2 G/DL
RBC # BLD: 4.64 M/UL
RBC # FLD: 13.5 %
SODIUM SERPL-SCNC: 140 MMOL/L
TIBC SERPL-MCNC: 276 UG/DL
TRIGL SERPL-MCNC: 66 MG/DL
TSH SERPL-ACNC: 3.49 UIU/ML
UIBC SERPL-MCNC: 223 UG/DL
VIT B12 SERPL-MCNC: 384 PG/ML
WBC # FLD AUTO: 4.63 K/UL

## 2025-01-30 ENCOUNTER — APPOINTMENT (OUTPATIENT)
Dept: INTERNAL MEDICINE | Facility: CLINIC | Age: 89
End: 2025-01-30
Payer: MEDICARE

## 2025-01-30 VITALS
TEMPERATURE: 97.7 F | WEIGHT: 238 LBS | HEART RATE: 89 BPM | OXYGEN SATURATION: 97 % | BODY MASS INDEX: 29.59 KG/M2 | HEIGHT: 75 IN | DIASTOLIC BLOOD PRESSURE: 79 MMHG | SYSTOLIC BLOOD PRESSURE: 126 MMHG

## 2025-01-30 DIAGNOSIS — L03.115 CELLULITIS OF RIGHT LOWER LIMB: ICD-10-CM

## 2025-01-30 PROCEDURE — G2211 COMPLEX E/M VISIT ADD ON: CPT

## 2025-01-30 PROCEDURE — 99213 OFFICE O/P EST LOW 20 MIN: CPT

## 2025-01-30 RX ORDER — CEPHALEXIN 500 MG/1
500 TABLET ORAL
Qty: 20 | Refills: 0 | Status: ACTIVE | COMMUNITY
Start: 2025-01-30 | End: 1900-01-01

## 2025-04-02 ENCOUNTER — APPOINTMENT (OUTPATIENT)
Dept: INTERNAL MEDICINE | Facility: CLINIC | Age: 89
End: 2025-04-02

## 2025-04-02 VITALS — TEMPERATURE: 97 F

## 2025-04-04 ENCOUNTER — APPOINTMENT (OUTPATIENT)
Dept: INTERNAL MEDICINE | Facility: CLINIC | Age: 89
End: 2025-04-04
Payer: MEDICARE

## 2025-04-04 VITALS
OXYGEN SATURATION: 99 % | SYSTOLIC BLOOD PRESSURE: 124 MMHG | TEMPERATURE: 97.3 F | HEIGHT: 75 IN | HEART RATE: 86 BPM | DIASTOLIC BLOOD PRESSURE: 76 MMHG | BODY MASS INDEX: 29.84 KG/M2 | WEIGHT: 240 LBS

## 2025-04-04 DIAGNOSIS — I48.11 LONGSTANDING PERSISTENT ATRIAL FI: ICD-10-CM

## 2025-04-04 DIAGNOSIS — I82.432 ACUTE EMBOLISM AND THROMBOSIS OF LEFT POPLITEAL VEIN: ICD-10-CM

## 2025-04-04 DIAGNOSIS — Z09 ENCOUNTER FOR FOLLOW-UP EXAMINATION AFTER COMPLETED TREATMENT FOR CONDITIONS OTHER THAN MALIGNANT NEOPLASM: ICD-10-CM

## 2025-04-04 PROCEDURE — 99495 TRANSJ CARE MGMT MOD F2F 14D: CPT

## 2025-04-04 RX ORDER — CYANOCOBALAMIN 1000 UG/ML
1000 INJECTION, SOLUTION INTRAMUSCULAR; SUBCUTANEOUS
Qty: 1 | Refills: 2 | Status: ACTIVE | COMMUNITY
Start: 2025-04-04

## 2025-04-04 RX ORDER — METOPROLOL TARTRATE 25 MG/1
25 TABLET ORAL TWICE DAILY
Qty: 90 | Refills: 1 | Status: ACTIVE | COMMUNITY
Start: 2025-04-04 | End: 1900-01-01

## 2025-04-04 RX ORDER — APIXABAN 5 MG/1
5 TABLET, FILM COATED ORAL
Qty: 180 | Refills: 0 | Status: ACTIVE | COMMUNITY
Start: 2025-04-04 | End: 1900-01-01

## 2025-04-10 ENCOUNTER — APPOINTMENT (OUTPATIENT)
Dept: INTERNAL MEDICINE | Facility: CLINIC | Age: 89
End: 2025-04-10

## 2025-06-17 NOTE — HISTORY OF PRESENT ILLNESS
KOMALMONIQUE CHRISTINE  70y, Male    LOS  3d    INTERVAL EVENTS/HPI  - T(F): , Max: 102.9 (06-16-25 @ 23:01)  - WBC Count: 22.35 (06-17-25 @ 06:06)  WBC Count: 33.02 (06-16-25 @ 06:28)  - Creatinine: 1.9 (06-17-25 @ 06:06)  Creatinine: 2.3 (06-16-25 @ 06:28)    REVIEW OF SYSTEMS: cannot obtain further history from the patient secondary to altered mental status or sedation    ANTIMICROBIALS:   cefepime   IVPB 1000 every 12 hours      OTHER MEDS: MEDICATIONS  (STANDING):  acetaminophen     Tablet .. 650 every 6 hours PRN  albuterol/ipratropium for Nebulization 3 every 6 hours PRN  aluminum hydroxide/magnesium hydroxide/simethicone Suspension 30 every 4 hours PRN  aMIOdarone    Tablet 200 daily  apixaban 5 two times a day  atorvastatin 40 at bedtime  clopidogrel Tablet 75 daily  dexMEDEtomidine Infusion 0.05 <Continuous>  dextrose 50% Injectable 25 once  dextrose 50% Injectable 12.5 once  dextrose 50% Injectable 25 once  dextrose Oral Gel 15 once PRN  ezetimibe 10 daily  famotidine    Tablet 20 daily  fentaNYL   Infusion. 0.508 <Continuous>  glucagon  Injectable 1 once  guaiFENesin Oral Liquid (Sugar-Free) 200 every 6 hours PRN  insulin glargine Injectable (LANTUS) 34 every morning  insulin lispro (ADMELOG) corrective regimen sliding scale  every 6 hours  melatonin 5 at bedtime PRN  midodrine 10 every 8 hours  norepinephrine Infusion 0.05 <Continuous>  ondansetron Injectable 4 every 8 hours PRN  propofol Infusion 5 <Continuous>  QUEtiapine 25 at bedtime      Vital Signs Last 24 Hrs  T(F): 100.2 (06-17-25 @ 07:11), Max: 102.9 (06-16-25 @ 23:01)    Vital Signs Last 24 Hrs  HR: 84 (06-17-25 @ 08:00) (70 - 778)  BP: 108/56 (06-17-25 @ 08:00) (88/49 - 130/58)  RR: 26 (06-17-25 @ 08:00)  SpO2: 100% (06-17-25 @ 08:00) (92% - 100%)  Wt(kg): --    EXAM:  GENERAL: On MV  HEAD: No head lesions  NECK: Supple  CHEST/LUNG: Shallow breath sounds   HEART: S1 S2  ABDOMEN: Soft, nontender +Mcmahon  EXTREMITIES: No clubbing  MSK: +2 edema   SKIN: No rashes or lesions, no superficial thrombophlebitis    Labs:                        11.5   22.35 )-----------( 233      ( 17 Jun 2025 06:06 )             39.1     06-17    141  |  107  |  59[H]  ----------------------------<  167[H]  4.9   |  24  |  1.9[H]    Ca    8.3[L]      17 Jun 2025 06:06  Mg     2.6     06-17    TPro  4.9[L]  /  Alb  2.6[L]  /  TBili  0.2  /  DBili  x   /  AST  12  /  ALT  7   /  AlkPhos  62  06-17      WBC Trend:  WBC Count: 22.35 (06-17-25 @ 06:06)  WBC Count: 33.02 (06-16-25 @ 06:28)  WBC Count: 46.99 (06-15-25 @ 06:45)  WBC Count: 40.88 (06-14-25 @ 19:24)      Creatine Trend:  Creatinine: 1.9 (06-17)  Creatinine: 2.3 (06-16)  Creatinine: 2.1 (06-15)  Creatinine: 2.0 (06-15)      Liver Biochemical Testing Trend:  Alanine Aminotransferase (ALT/SGPT): 7 (06-17)  Alanine Aminotransferase (ALT/SGPT): 11 (06-16)  Alanine Aminotransferase (ALT/SGPT): 8 (06-15)  Alanine Aminotransferase (ALT/SGPT): 11 (06-14)  Alanine Aminotransferase (ALT/SGPT): TNP (06-14)  Aspartate Aminotransferase (AST/SGOT): 12 (06-17-25 @ 06:06)  Aspartate Aminotransferase (AST/SGOT): 18 (06-16-25 @ 06:28)  Aspartate Aminotransferase (AST/SGOT): 12 (06-15-25 @ 06:45)  Aspartate Aminotransferase (AST/SGOT): 20 (06-14-25 @ 19:24)  Aspartate Aminotransferase (AST/SGOT): TNP (06-14-25 @ 12:00)  Bilirubin Total: 0.2 (06-17)  Bilirubin Total: 0.4 (06-16)  Bilirubin Total: 0.4 (06-15)  Bilirubin Total: 0.4 (06-14)  Bilirubin Total: TNP (06-14)      Trend LDH  06-15-25 @ 06:45  375[H]      Urinalysis Basic - ( 17 Jun 2025 06:06 )    Color: x / Appearance: x / SG: x / pH: x  Gluc: 167 mg/dL / Ketone: x  / Bili: x / Urobili: x   Blood: x / Protein: x / Nitrite: x   Leuk Esterase: x / RBC: x / WBC x   Sq Epi: x / Non Sq Epi: x / Bacteria: x        MICROBIOLOGY:  Vancomycin Level, Random: 14.5 (06-15 @ 06:45)    Male    Culture - Sputum (collected 15 Kleber 2025 16:44)  Source: Sputum Sputum  Final Report:    Commensal magda consistent with body site    Culture - Blood (collected 14 Jun 2025 15:36)  Source: Blood None  Preliminary Report:    No growth at 48 Hours    Culture - Blood (collected 02 Jun 2025 18:05)  Source: Blood Blood  Final Report:    No growth at 5 days    Culture - Blood (collected 02 Jun 2025 18:05)  Source: Blood Blood  Final Report:    No growth at 5 days    Culture - Blood (collected 18 Apr 2025 22:18)  Source: Blood Blood  Final Report:    No growth at 5 days    Culture - Blood (collected 18 Apr 2025 22:17)  Source: Blood Blood  Final Report:    No growth at 5 days    Culture - Blood (collected 28 Mar 2025 20:45)  Source: Blood Blood-Peripheral  Final Report:    No growth at 5 days    Culture - Blood (collected 31 Dec 2024 15:45)  Source: .Blood BLOOD  Final Report:    No growth at 5 days    Culture - Blood (collected 03 Sep 2023 12:50)  Source: .Blood Blood  Final Report:    No growth at 5 days    Culture - Blood (collected 03 Sep 2023 12:50)  Source: .Blood Blood  Final Report:    No growth at 5 days      HIV-1/2 Combo Result: Nonreact (06-16-25 @ 06:28)  HIV-1/2 Combo Result: Nonreact (01-04-25 @ 05:58)    COVID-19 PCR: NotDetec (05-15-25 @ 09:50)  Procalcitonin: 1.49 (06-14)  Lactate Dehydrogenase, Serum: 375 (06-15)      Troponin T, High Sensitivity Result: 121 (06-15)  Troponin T, High Sensitivity Result: 141 (06-14)  Troponin T, High Sensitivity Result: 134 (06-14)  Troponin T, High Sensitivity Result: 137 (06-14)  Troponin T, High Sensitivity Result: 121 (06-14)  Troponin T, High Sensitivity Result: 113 (06-14)    Blood Gas Arterial, Lactate: 0.7 (06-17 @ 05:48)  Blood Gas Arterial, Lactate: 0.7 (06-17 @ 04:26)  Blood Gas Arterial, Lactate: 0.9 (06-16 @ 18:43)  Blood Gas Arterial, Lactate: 1.1 (06-16 @ 15:29)        RADIOLOGY & ADDITIONAL TESTS:  I have personally reviewed the relevant images.   CXR      CT  < from: Xray Chest 1 View- PORTABLE-Routine (Xray Chest 1 View- PORTABLE-Routine in AM.) (06.16.25 @ 07:42) >  INTERPRETATION:  CLINICAL HISTORY: Intubated    COMPARISON: 6/15/2025.    TECHNIQUE: Frontal    FINDINGS:    Support devices: Endotracheal tube is obscured. Nasogastric tube is   coursing below the diaphragm. Left-sided cardiac pacing device.    Cardiac/mediastinum/hilum: Stable..    Lung parenchyma/Pleura: Significant increase bilateral lung opacities    Skeleton/soft tissues: Stable..      IMPRESSION:    Significant increase in bilateral lung opacities    --- End of Report ---    < end of copied text >        WEIGHT  Weight (kg): 83.3 (06-14-25 @ 08:00)  Creatinine: 1.9 mg/dL (06-17-25 @ 06:06)      All available historical records have been reviewed       KOMALMONIQUE CHRISTINE  70y, Male    LOS  3d    INTERVAL EVENTS/HPI  - T(F): , Max: 102.9 (06-16-25 @ 23:01)  - WBC Count: 22.35 (06-17-25 @ 06:06)  WBC Count: 33.02 (06-16-25 @ 06:28)  - Creatinine: 1.9 (06-17-25 @ 06:06)  Creatinine: 2.3 (06-16-25 @ 06:28)    REVIEW OF SYSTEMS: cannot obtain further history from the patient secondary to altered mental status or sedation    ANTIMICROBIALS:   cefepime   IVPB 1000 every 12 hours      OTHER MEDS: MEDICATIONS  (STANDING):  acetaminophen     Tablet .. 650 every 6 hours PRN  albuterol/ipratropium for Nebulization 3 every 6 hours PRN  aluminum hydroxide/magnesium hydroxide/simethicone Suspension 30 every 4 hours PRN  aMIOdarone    Tablet 200 daily  apixaban 5 two times a day  atorvastatin 40 at bedtime  clopidogrel Tablet 75 daily  dexMEDEtomidine Infusion 0.05 <Continuous>  dextrose 50% Injectable 25 once  dextrose 50% Injectable 12.5 once  dextrose 50% Injectable 25 once  dextrose Oral Gel 15 once PRN  ezetimibe 10 daily  famotidine    Tablet 20 daily  fentaNYL   Infusion. 0.508 <Continuous>  glucagon  Injectable 1 once  guaiFENesin Oral Liquid (Sugar-Free) 200 every 6 hours PRN  insulin glargine Injectable (LANTUS) 34 every morning  insulin lispro (ADMELOG) corrective regimen sliding scale  every 6 hours  melatonin 5 at bedtime PRN  midodrine 10 every 8 hours  norepinephrine Infusion 0.05 <Continuous>  ondansetron Injectable 4 every 8 hours PRN  propofol Infusion 5 <Continuous>  QUEtiapine 25 at bedtime      Vital Signs Last 24 Hrs  T(F): 100.2 (06-17-25 @ 07:11), Max: 102.9 (06-16-25 @ 23:01)    Vital Signs Last 24 Hrs  HR: 84 (06-17-25 @ 08:00) (70 - 778)  BP: 108/56 (06-17-25 @ 08:00) (88/49 - 130/58)  RR: 26 (06-17-25 @ 08:00)  SpO2: 100% (06-17-25 @ 08:00) (92% - 100%)  Wt(kg): --    EXAM:  GENERAL: On MV  HEAD: No head lesions  NECK: Supple  CHEST/LUNG: Shallow breath sounds   HEART: S1 S2  ABDOMEN: Soft, nontender +Mcmahon, Some loose stools in rectal tube.  EXTREMITIES: No clubbing  MSK: +1 edema   SKIN: No rashes or lesions, no superficial thrombophlebitis    Labs:                        11.5   22.35 )-----------( 233      ( 17 Jun 2025 06:06 )             39.1     06-17    141  |  107  |  59[H]  ----------------------------<  167[H]  4.9   |  24  |  1.9[H]    Ca    8.3[L]      17 Jun 2025 06:06  Mg     2.6     06-17    TPro  4.9[L]  /  Alb  2.6[L]  /  TBili  0.2  /  DBili  x   /  AST  12  /  ALT  7   /  AlkPhos  62  06-17      WBC Trend:  WBC Count: 22.35 (06-17-25 @ 06:06)  WBC Count: 33.02 (06-16-25 @ 06:28)  WBC Count: 46.99 (06-15-25 @ 06:45)  WBC Count: 40.88 (06-14-25 @ 19:24)      Creatine Trend:  Creatinine: 1.9 (06-17)  Creatinine: 2.3 (06-16)  Creatinine: 2.1 (06-15)  Creatinine: 2.0 (06-15)      Liver Biochemical Testing Trend:  Alanine Aminotransferase (ALT/SGPT): 7 (06-17)  Alanine Aminotransferase (ALT/SGPT): 11 (06-16)  Alanine Aminotransferase (ALT/SGPT): 8 (06-15)  Alanine Aminotransferase (ALT/SGPT): 11 (06-14)  Alanine Aminotransferase (ALT/SGPT): TNP (06-14)  Aspartate Aminotransferase (AST/SGOT): 12 (06-17-25 @ 06:06)  Aspartate Aminotransferase (AST/SGOT): 18 (06-16-25 @ 06:28)  Aspartate Aminotransferase (AST/SGOT): 12 (06-15-25 @ 06:45)  Aspartate Aminotransferase (AST/SGOT): 20 (06-14-25 @ 19:24)  Aspartate Aminotransferase (AST/SGOT): TNP (06-14-25 @ 12:00)  Bilirubin Total: 0.2 (06-17)  Bilirubin Total: 0.4 (06-16)  Bilirubin Total: 0.4 (06-15)  Bilirubin Total: 0.4 (06-14)  Bilirubin Total: TNP (06-14)      Trend LDH  06-15-25 @ 06:45  375[H]      Urinalysis Basic - ( 17 Jun 2025 06:06 )    Color: x / Appearance: x / SG: x / pH: x  Gluc: 167 mg/dL / Ketone: x  / Bili: x / Urobili: x   Blood: x / Protein: x / Nitrite: x   Leuk Esterase: x / RBC: x / WBC x   Sq Epi: x / Non Sq Epi: x / Bacteria: x        MICROBIOLOGY:  Vancomycin Level, Random: 14.5 (06-15 @ 06:45)    Male    Culture - Sputum (collected 15 Kleber 2025 16:44)  Source: Sputum Sputum  Final Report:    Commensal magda consistent with body site    Culture - Blood (collected 14 Jun 2025 15:36)  Source: Blood None  Preliminary Report:    No growth at 48 Hours    Culture - Blood (collected 02 Jun 2025 18:05)  Source: Blood Blood  Final Report:    No growth at 5 days    Culture - Blood (collected 02 Jun 2025 18:05)  Source: Blood Blood  Final Report:    No growth at 5 days    Culture - Blood (collected 18 Apr 2025 22:18)  Source: Blood Blood  Final Report:    No growth at 5 days    Culture - Blood (collected 18 Apr 2025 22:17)  Source: Blood Blood  Final Report:    No growth at 5 days    Culture - Blood (collected 28 Mar 2025 20:45)  Source: Blood Blood-Peripheral  Final Report:    No growth at 5 days    Culture - Blood (collected 31 Dec 2024 15:45)  Source: .Blood BLOOD  Final Report:    No growth at 5 days    Culture - Blood (collected 03 Sep 2023 12:50)  Source: .Blood Blood  Final Report:    No growth at 5 days    Culture - Blood (collected 03 Sep 2023 12:50)  Source: .Blood Blood  Final Report:    No growth at 5 days      HIV-1/2 Combo Result: Nonreact (06-16-25 @ 06:28)  HIV-1/2 Combo Result: Nonreact (01-04-25 @ 05:58)    COVID-19 PCR: NotDetec (05-15-25 @ 09:50)  Procalcitonin: 1.49 (06-14)  Lactate Dehydrogenase, Serum: 375 (06-15)      Troponin T, High Sensitivity Result: 121 (06-15)  Troponin T, High Sensitivity Result: 141 (06-14)  Troponin T, High Sensitivity Result: 134 (06-14)  Troponin T, High Sensitivity Result: 137 (06-14)  Troponin T, High Sensitivity Result: 121 (06-14)  Troponin T, High Sensitivity Result: 113 (06-14)    Blood Gas Arterial, Lactate: 0.7 (06-17 @ 05:48)  Blood Gas Arterial, Lactate: 0.7 (06-17 @ 04:26)  Blood Gas Arterial, Lactate: 0.9 (06-16 @ 18:43)  Blood Gas Arterial, Lactate: 1.1 (06-16 @ 15:29)        RADIOLOGY & ADDITIONAL TESTS:  I have personally reviewed the relevant images.   CXR      CT  < from: Xray Chest 1 View- PORTABLE-Routine (Xray Chest 1 View- PORTABLE-Routine in AM.) (06.16.25 @ 07:42) >  INTERPRETATION:  CLINICAL HISTORY: Intubated    COMPARISON: 6/15/2025.    TECHNIQUE: Frontal    FINDINGS:    Support devices: Endotracheal tube is obscured. Nasogastric tube is   coursing below the diaphragm. Left-sided cardiac pacing device.    Cardiac/mediastinum/hilum: Stable..    Lung parenchyma/Pleura: Significant increase bilateral lung opacities    Skeleton/soft tissues: Stable..      IMPRESSION:    Significant increase in bilateral lung opacities    --- End of Report ---    < end of copied text >        WEIGHT  Weight (kg): 83.3 (06-14-25 @ 08:00)  Creatinine: 1.9 mg/dL (06-17-25 @ 06:06)      All available historical records have been reviewed       [[email or web address]] : patient states that he put a qtip in his ear a few days ago and now cannot hear out of this ear. right ear

## (undated) DEVICE — VENODYNE/SCD SLEEVE CALF LARGE

## (undated) DEVICE — GLV 7.5 PROTEXIS (WHITE)

## (undated) DEVICE — ELCTR CUTTING LOOP 24FR 12 DEG 0.35 WIRE

## (undated) DEVICE — ELCTR PLASMA LOOP MEDIUM ANGLED 24FR 12-30 DEG

## (undated) DEVICE — TUBING SUCTION 20FT

## (undated) DEVICE — FOLEY HOLDER STATLOCK 2 WAY ADULT

## (undated) DEVICE — POSITIONER FOAM EGG CRATE ULNAR 2PCS (PINK)

## (undated) DEVICE — GLV 6.5 PROTEXIS (WHITE)

## (undated) DEVICE — GLV 7 PROTEXIS (WHITE)

## (undated) DEVICE — ACMI SELF-SEALING SEAL UP TO 7FR

## (undated) DEVICE — SOL IRR BAG H2O 3000ML

## (undated) DEVICE — ELCTR PLASMA LOOP MEDIUM 24FR 12-16 DEG

## (undated) DEVICE — IRRIGATION TRAY W PISTON SYRINGE 60ML

## (undated) DEVICE — BAG URINE W METER 2L

## (undated) DEVICE — PACK CYSTO

## (undated) DEVICE — ELCTR CAUTERY ROLLER BALL 24-28F

## (undated) DEVICE — SYR LUER LOK 10CC

## (undated) DEVICE — SOL IRR POUR H2O 1500ML

## (undated) DEVICE — WARMING BLANKET UPPER ADULT

## (undated) DEVICE — SOL IRR BAG NS 0.9% 3000ML

## (undated) DEVICE — GLV 8 PROTEXIS (CREAM) NEU-THERA

## (undated) DEVICE — SYR LUER LOK 30CC

## (undated) DEVICE — DRAPE EQUIPMENT BANDED BAG 30 X 30" (SHOWER CAP)

## (undated) DEVICE — Device

## (undated) DEVICE — GOWN TRIMAX LG

## (undated) DEVICE — TUBING RANGER FLUID IRRIGATION SET DISP

## (undated) DEVICE — FOLEY CATH 3-WAY 22FR 30CC LATEX LUBRICATH

## (undated) DEVICE — FOLEY TRAY 16FR 5CC LTX UMETER CLOSED